# Patient Record
Sex: MALE | Race: WHITE | NOT HISPANIC OR LATINO
[De-identification: names, ages, dates, MRNs, and addresses within clinical notes are randomized per-mention and may not be internally consistent; named-entity substitution may affect disease eponyms.]

---

## 2020-06-16 ENCOUNTER — APPOINTMENT (OUTPATIENT)
Dept: INTERNAL MEDICINE | Facility: CLINIC | Age: 60
End: 2020-06-16
Payer: COMMERCIAL

## 2020-06-16 VITALS
HEIGHT: 73 IN | BODY MASS INDEX: 23.86 KG/M2 | DIASTOLIC BLOOD PRESSURE: 80 MMHG | SYSTOLIC BLOOD PRESSURE: 120 MMHG | WEIGHT: 180 LBS

## 2020-06-16 DIAGNOSIS — Z80.0 FAMILY HISTORY OF MALIGNANT NEOPLASM OF DIGESTIVE ORGANS: ICD-10-CM

## 2020-06-16 DIAGNOSIS — Z82.49 FAMILY HISTORY OF ISCHEMIC HEART DISEASE AND OTHER DISEASES OF THE CIRCULATORY SYSTEM: ICD-10-CM

## 2020-06-16 PROCEDURE — 99443: CPT

## 2020-06-16 NOTE — ASSESSMENT
[FreeTextEntry1] : 1. Anxiety/Depression: has been worse in the past and again worse, agrees to start again, will start on Lexapro 5 mg and follow up in 2 weeks. \par 2. Hyperglycemia : will re check labs\par 3. HTN : well controlled\par 4: Parkinson's : has to awaken in the middle of the night to take meds, will discuss with Neuro re long acting meds\par 5. Possible COVID exposure: will test. \par 6. cont Medical Marijuana

## 2020-06-16 NOTE — HEALTH RISK ASSESSMENT
[No] : No [No falls in past year] : Patient reported no falls in the past year [1] : 2) Feeling down, depressed, or hopeless for several days (1) [] : No [de-identified] : occ marijuana  [de-identified] : walking

## 2020-06-16 NOTE — HISTORY OF PRESENT ILLNESS
[Home] : at home, [unfilled] , at the time of the visit. [Other Location: e.g. Home (Enter Location, City,State)___] : at [unfilled] [Spouse] : spouse [Verbal consent obtained from patient] : the patient, [unfilled] [FreeTextEntry1] : Here for reestablishment of medical care. Has not been feeling well. Weight is down due loss of appetite  for the past months on and off. \par Mood is up and down, anxiety is up and started on xanax by Sima and much relieved.  [de-identified] : 1. Parkinson's \par 2. Hyperglycemia\par 3. HTN

## 2020-06-16 NOTE — REVIEW OF SYSTEMS
[Fatigue] : fatigue [Nausea] : nausea [Vomiting] : vomiting [Nocturia] : nocturia [Joint Pain] : joint pain [Anxiety] : anxiety [Depression] : depression [Negative] : Integumentary [de-identified] : due to Parkinson's [FreeTextEntry7] : occasional  [FreeTextEntry9] : shoulder pain chronic

## 2020-06-30 ENCOUNTER — APPOINTMENT (OUTPATIENT)
Dept: INTERNAL MEDICINE | Facility: CLINIC | Age: 60
End: 2020-06-30
Payer: COMMERCIAL

## 2020-06-30 VITALS — WEIGHT: 190 LBS | HEIGHT: 73.5 IN | BODY MASS INDEX: 24.64 KG/M2

## 2020-06-30 PROCEDURE — 99442: CPT

## 2020-06-30 NOTE — ASSESSMENT
[FreeTextEntry1] : Anxiety: has only been on the meds( lexapro) for one week, to continue and follow up  in 1-2 weeks. Wife states his mood  and anxiety are already better, he does not notice it.

## 2020-06-30 NOTE — HISTORY OF PRESENT ILLNESS
[Home] : at home, [unfilled] , at the time of the visit. [Other Location: e.g. Home (Enter Location, City,State)___] : at [unfilled] [Spouse] : spouse [Verbal consent obtained from patient] : the patient, [unfilled] [de-identified] : Hx of Anxiety  and started on Lexapro one week ago and  wife notes some improvement in his anxiety  and depression. Appetite is still down and wt is down.

## 2020-07-07 LAB
ALBUMIN SERPL ELPH-MCNC: 4.5 G/DL
ALP BLD-CCNC: 49 U/L
ALT SERPL-CCNC: <5 U/L
ANION GAP SERPL CALC-SCNC: 12 MMOL/L
AST SERPL-CCNC: 11 U/L
BASOPHILS # BLD AUTO: 0.05 K/UL
BASOPHILS NFR BLD AUTO: 0.6 %
BILIRUB SERPL-MCNC: 0.6 MG/DL
BUN SERPL-MCNC: 11 MG/DL
CALCIUM SERPL-MCNC: 9.5 MG/DL
CHLORIDE SERPL-SCNC: 91 MMOL/L
CHOLEST SERPL-MCNC: 161 MG/DL
CHOLEST/HDLC SERPL: 2.9 RATIO
CO2 SERPL-SCNC: 24 MMOL/L
CREAT SERPL-MCNC: 0.62 MG/DL
EOSINOPHIL # BLD AUTO: 0.19 K/UL
EOSINOPHIL NFR BLD AUTO: 2.2 %
ESTIMATED AVERAGE GLUCOSE: 117 MG/DL
GLUCOSE SERPL-MCNC: 134 MG/DL
HBA1C MFR BLD HPLC: 5.7 %
HCT VFR BLD CALC: 39.8 %
HDLC SERPL-MCNC: 56 MG/DL
HGB BLD-MCNC: 12.8 G/DL
IMM GRANULOCYTES NFR BLD AUTO: 0.5 %
LDLC SERPL CALC-MCNC: 90 MG/DL
LYMPHOCYTES # BLD AUTO: 1.13 K/UL
LYMPHOCYTES NFR BLD AUTO: 13 %
MAN DIFF?: NORMAL
MCHC RBC-ENTMCNC: 29.5 PG
MCHC RBC-ENTMCNC: 32.2 GM/DL
MCV RBC AUTO: 91.7 FL
MONOCYTES # BLD AUTO: 0.69 K/UL
MONOCYTES NFR BLD AUTO: 8 %
NEUTROPHILS # BLD AUTO: 6.57 K/UL
NEUTROPHILS NFR BLD AUTO: 75.7 %
PLATELET # BLD AUTO: 349 K/UL
POTASSIUM SERPL-SCNC: 4.5 MMOL/L
PROT SERPL-MCNC: 6.4 G/DL
RBC # BLD: 4.34 M/UL
RBC # FLD: 12.5 %
SARS-COV-2 IGG SERPL IA-ACNC: <0.1 INDEX
SARS-COV-2 IGG SERPL QL IA: NEGATIVE
SODIUM SERPL-SCNC: 126 MMOL/L
TRIGL SERPL-MCNC: 76 MG/DL
TSH SERPL-ACNC: 1.1 UIU/ML
WBC # FLD AUTO: 8.67 K/UL

## 2020-07-13 ENCOUNTER — RX RENEWAL (OUTPATIENT)
Age: 60
End: 2020-07-13

## 2020-07-21 ENCOUNTER — TRANSCRIPTION ENCOUNTER (OUTPATIENT)
Age: 60
End: 2020-07-21

## 2020-07-21 ENCOUNTER — APPOINTMENT (OUTPATIENT)
Dept: INTERNAL MEDICINE | Facility: CLINIC | Age: 60
End: 2020-07-21
Payer: COMMERCIAL

## 2020-07-21 PROCEDURE — 99213 OFFICE O/P EST LOW 20 MIN: CPT | Mod: 95

## 2020-07-21 NOTE — PHYSICAL EXAM
[Normal] : no acute distress, well nourished, well developed and well-appearing [Speech Grossly Normal] : speech grossly normal [de-identified] : slightly stoned face c/w Parkinson's

## 2020-07-21 NOTE — HISTORY OF PRESENT ILLNESS
[Home] : at home, [unfilled] , at the time of the visit. [Medical Office: ([facility])___] : at the medical office located in  [Verbal consent obtained from patient] : the patient, [unfilled] [Spouse] : spouse [FreeTextEntry1] : here for follow up, has been taking the Lexapro and his wife clearly states that he is less anxious. States he is not eating as much as, thinks that he is losing weight. States that he is not sleeping well, and according to his wife it is a little better .  [de-identified] : Here for follow up for medication effectiveness

## 2020-07-21 NOTE — ASSESSMENT
[FreeTextEntry1] : Anxiety /Depression: To continue on the Lexapro 10, down to 1 xanax per day from 3x day and to continue  the current therapies. F/u with telehealth in 2 weeks .

## 2020-08-09 ENCOUNTER — RX RENEWAL (OUTPATIENT)
Age: 60
End: 2020-08-09

## 2020-09-07 RX ORDER — ESCITALOPRAM OXALATE 20 MG/1
20 TABLET ORAL DAILY
Qty: 30 | Refills: 0 | Status: DISCONTINUED | COMMUNITY
Start: 2020-07-13 | End: 2020-09-07

## 2020-09-22 ENCOUNTER — APPOINTMENT (OUTPATIENT)
Dept: INTERNAL MEDICINE | Facility: CLINIC | Age: 60
End: 2020-09-22
Payer: COMMERCIAL

## 2020-09-22 VITALS
WEIGHT: 178 LBS | DIASTOLIC BLOOD PRESSURE: 60 MMHG | BODY MASS INDEX: 23.09 KG/M2 | TEMPERATURE: 97.8 F | SYSTOLIC BLOOD PRESSURE: 100 MMHG | OXYGEN SATURATION: 98 % | HEIGHT: 73.5 IN | HEART RATE: 78 BPM

## 2020-09-22 PROCEDURE — 90715 TDAP VACCINE 7 YRS/> IM: CPT

## 2020-09-22 PROCEDURE — 90686 IIV4 VACC NO PRSV 0.5 ML IM: CPT

## 2020-09-22 PROCEDURE — 99213 OFFICE O/P EST LOW 20 MIN: CPT | Mod: 25

## 2020-09-22 PROCEDURE — 90472 IMMUNIZATION ADMIN EACH ADD: CPT

## 2020-09-22 PROCEDURE — G0008: CPT

## 2020-09-22 RX ORDER — AZILSARTAN KAMEDOXOMIL 40 MG/1
40 TABLET ORAL
Refills: 0 | Status: DISCONTINUED | COMMUNITY
End: 2020-09-22

## 2020-09-22 NOTE — HISTORY OF PRESENT ILLNESS
[FreeTextEntry1] : Here for follow up. Continue to complain of decreased appetite, wt loss, and stiffness and balance problems .  [de-identified] : Having difficulties with movement and even fell 6 days ago, abrasion of his shoulder, and back. Unknown when his last tetanus vaccine was. Started to go back to PT and working on balance and strength training . Anxiety is better , remains on the 10 mg of lexapro .

## 2020-09-22 NOTE — ASSESSMENT
[FreeTextEntry1] : 1. HTN : too low may be contributing to his lightheadedness , to stop\par 2. abrasion: needs tdap \par 3. needs flu vaccine \par 4. Hyperglycemia, cont off the metformin , a1cc = 5.7\par 5 wt loss: decreased appetite , likely secondary to his meds and  disease\par 6. Parkinson's . to follow up with Dr Gao, and to cont PT  and home exercise. \par

## 2020-09-22 NOTE — PHYSICAL EXAM
[Normal] : soft, non-tender, non-distended, no masses palpated, no HSM and normal bowel sounds [de-identified] : masked face [de-identified] : minor abrasion on shoulder and back , no evidence of infection

## 2020-11-16 ENCOUNTER — RX RENEWAL (OUTPATIENT)
Age: 60
End: 2020-11-16

## 2020-11-24 ENCOUNTER — APPOINTMENT (OUTPATIENT)
Dept: INTERNAL MEDICINE | Facility: CLINIC | Age: 60
End: 2020-11-24
Payer: COMMERCIAL

## 2020-11-24 VITALS
TEMPERATURE: 96.44 F | WEIGHT: 175 LBS | BODY MASS INDEX: 22.78 KG/M2 | SYSTOLIC BLOOD PRESSURE: 128 MMHG | OXYGEN SATURATION: 98 % | HEART RATE: 82 BPM | DIASTOLIC BLOOD PRESSURE: 74 MMHG

## 2020-11-24 LAB
ALBUMIN SERPL ELPH-MCNC: 4.7 G/DL
ALP BLD-CCNC: 60 U/L
ALT SERPL-CCNC: <5 U/L
ANION GAP SERPL CALC-SCNC: 12 MMOL/L
AST SERPL-CCNC: 15 U/L
BASOPHILS # BLD AUTO: 0.05 K/UL
BASOPHILS NFR BLD AUTO: 0.7 %
BILIRUB SERPL-MCNC: 0.3 MG/DL
BUN SERPL-MCNC: 17 MG/DL
CALCIUM SERPL-MCNC: 9.6 MG/DL
CHLORIDE SERPL-SCNC: 100 MMOL/L
CHOLEST SERPL-MCNC: 182 MG/DL
CO2 SERPL-SCNC: 25 MMOL/L
CREAT SERPL-MCNC: 0.71 MG/DL
EOSINOPHIL # BLD AUTO: 0.25 K/UL
EOSINOPHIL NFR BLD AUTO: 3.3 %
ESTIMATED AVERAGE GLUCOSE: 114 MG/DL
GLUCOSE SERPL-MCNC: 114 MG/DL
HBA1C MFR BLD HPLC: 5.6 %
HCT VFR BLD CALC: 42.6 %
HDLC SERPL-MCNC: 76 MG/DL
HGB BLD-MCNC: 13.6 G/DL
IMM GRANULOCYTES NFR BLD AUTO: 0.4 %
LDLC SERPL CALC-MCNC: 97 MG/DL
LYMPHOCYTES # BLD AUTO: 2 K/UL
LYMPHOCYTES NFR BLD AUTO: 26 %
MAN DIFF?: NORMAL
MCHC RBC-ENTMCNC: 30.8 PG
MCHC RBC-ENTMCNC: 31.9 GM/DL
MCV RBC AUTO: 96.6 FL
MONOCYTES # BLD AUTO: 0.57 K/UL
MONOCYTES NFR BLD AUTO: 7.4 %
NEUTROPHILS # BLD AUTO: 4.78 K/UL
NEUTROPHILS NFR BLD AUTO: 62.2 %
NONHDLC SERPL-MCNC: 106 MG/DL
PLATELET # BLD AUTO: 329 K/UL
POTASSIUM SERPL-SCNC: 4.9 MMOL/L
PROT SERPL-MCNC: 6.6 G/DL
PSA SERPL-MCNC: 0.84 NG/ML
RBC # BLD: 4.41 M/UL
RBC # FLD: 13.3 %
SODIUM SERPL-SCNC: 137 MMOL/L
TRIGL SERPL-MCNC: 41 MG/DL
TSH SERPL-ACNC: 1.43 UIU/ML
WBC # FLD AUTO: 7.68 K/UL

## 2020-11-24 PROCEDURE — 36415 COLL VENOUS BLD VENIPUNCTURE: CPT

## 2020-11-24 PROCEDURE — 99396 PREV VISIT EST AGE 40-64: CPT | Mod: 25

## 2020-11-24 RX ORDER — AMOXICILLIN 500 MG/1
500 CAPSULE ORAL
Qty: 21 | Refills: 0 | Status: DISCONTINUED | COMMUNITY
Start: 2020-08-14

## 2020-11-24 NOTE — HEALTH RISK ASSESSMENT
[Fair] :  ~his/her~ mood as fair [Intercurrent ED visits] : went to ED [No] : No [One fall no injury in past year] : Patient reported one fall in the past year without injury [1] : 2) Feeling down, depressed, or hopeless for several days (1) [None] : None [With Significant Other] : lives with significant other [# of Members in Household ___] :  household currently consist of [unfilled] member(s) [Retired] : retired [College] : College [] :  [# Of Children ___] : has [unfilled] children [Feels Safe at Home] : Feels safe at home [Smoke Detector] : smoke detector [Carbon Monoxide Detector] : carbon monoxide detector [Safety elements used in home] : safety elements used in home [Seat Belt] :  uses seat belt [Sunscreen] : uses sunscreen [With Patient/Caregiver] : With Patient/Caregiver [] : No [de-identified] : Doing some PT  [EyeExamDate] : 2019 [Change in mental status noted] : No change in mental status noted [Language] : denies difficulty with language [Behavior] : denies difficulty with behavior [Learning/Retaining New Information] : denies difficulty learning/retaining new information [Handling Complex Tasks] : denies difficulty handling complex tasks [Reasoning] : denies difficulty with reasoning [Spatial Ability and Orientation] : denies difficulty with spatial ability and orientation [Sexually Active] : not sexually active [High Risk Behavior] : no high risk behavior [Reports changes in hearing] : Reports no changes in hearing [Reports changes in vision] : Reports no changes in vision [Reports normal functional visual acuity (ie: able to read med bottle)] : Reports poor functional visual acuity.  [Reports changes in dental health] : Reports no changes in dental health [Guns at Home] : no guns at home [Travel to Developing Areas] : does not  travel to developing areas [TB Exposure] : is not being exposed to tuberculosis [Caregiver Concerns] : does not have caregiver concerns [de-identified] : no [de-identified] : no [AdvancecareDate] : 11/24/20

## 2020-11-24 NOTE — PHYSICAL EXAM
[No Acute Distress] : no acute distress [Well Nourished] : well nourished [Well Developed] : well developed [Normal Voice/Communication] : normal voice/communication [Ill-Appearing] : ill-appearing [No Carotid Bruits] : no carotid bruits [Normal Appearance] : normal in appearance [No Masses] : no palpable masses [No Nipple Discharge] : no nipple discharge [Normal Sphincter Tone] : normal sphincter tone [No Mass] : no mass [Stool Occult Blood] : stool negative for occult blood [Normal] : no rash [Memory Grossly Normal] : memory grossly normal [Alert and Oriented x3] : oriented to person, place, and time [de-identified] : sarcopenia  [de-identified] : stiffness [de-identified] : slightly depressed

## 2020-11-24 NOTE — HISTORY OF PRESENT ILLNESS
[Spouse] : spouse [FreeTextEntry1] : Here for full PE [de-identified] : Here for full PE, hx of Parkinson's disease that is progressing. Hx of anxiety as well as pre DM. No symptoms of covid, not exposed

## 2020-11-24 NOTE — ASSESSMENT
[FreeTextEntry1] : 1. Anxiety: a little bit better according to his wife. Mostly is when he is home alone\par 2 Parkinson's to follow up with Dr Gao \par 3Hyperglycemia: repeat today \par 4. HTN controlled \par 5. HCM: received the flu vaccine , received both shingles, pna to check , colon up to date , to get old records\par 6. knee to follow up with ortho. \par 7. pain : using cbd oil/pills

## 2020-11-24 NOTE — REVIEW OF SYSTEMS
[Fatigue] : fatigue [Recent Change In Weight] : ~T recent weight change [Joint Pain] : joint pain [Joint Stiffness] : joint stiffness [Muscle Weakness] : muscle weakness [Suicidal] : not suicidal [Insomnia] : no insomnia [Anxiety] : anxiety [Depression] : depression [Negative] : Integumentary [FreeTextEntry2] : decreased appetite [FreeTextEntry9] : knee pains, seen by ortho,who rec knee injections [de-identified] : + Parkinson's stiffness, tremor

## 2021-02-16 ENCOUNTER — RX RENEWAL (OUTPATIENT)
Age: 61
End: 2021-02-16

## 2021-03-02 LAB
ESTIMATED AVERAGE GLUCOSE: 117 MG/DL
HBA1C MFR BLD HPLC: 5.7 %

## 2021-03-03 LAB
ALBUMIN SERPL ELPH-MCNC: 4.3 G/DL
ALP BLD-CCNC: 45 U/L
ALT SERPL-CCNC: 6 U/L
ANION GAP SERPL CALC-SCNC: 9 MMOL/L
AST SERPL-CCNC: 13 U/L
BILIRUB SERPL-MCNC: 0.6 MG/DL
BUN SERPL-MCNC: 18 MG/DL
CALCIUM SERPL-MCNC: 9.8 MG/DL
CHLORIDE SERPL-SCNC: 100 MMOL/L
CO2 SERPL-SCNC: 30 MMOL/L
CREAT SERPL-MCNC: 0.84 MG/DL
GLUCOSE SERPL-MCNC: 100 MG/DL
POTASSIUM SERPL-SCNC: 4.1 MMOL/L
PROT SERPL-MCNC: 6.2 G/DL
SODIUM SERPL-SCNC: 139 MMOL/L

## 2021-03-08 ENCOUNTER — RX RENEWAL (OUTPATIENT)
Age: 61
End: 2021-03-08

## 2021-03-16 ENCOUNTER — APPOINTMENT (OUTPATIENT)
Dept: INTERNAL MEDICINE | Facility: CLINIC | Age: 61
End: 2021-03-16
Payer: COMMERCIAL

## 2021-03-16 VITALS
BODY MASS INDEX: 23.61 KG/M2 | OXYGEN SATURATION: 98 % | HEIGHT: 73.5 IN | SYSTOLIC BLOOD PRESSURE: 126 MMHG | WEIGHT: 182 LBS | HEART RATE: 74 BPM | DIASTOLIC BLOOD PRESSURE: 80 MMHG | TEMPERATURE: 97.34 F

## 2021-03-16 PROCEDURE — 99214 OFFICE O/P EST MOD 30 MIN: CPT

## 2021-03-16 PROCEDURE — 99072 ADDL SUPL MATRL&STAF TM PHE: CPT

## 2021-03-16 RX ORDER — CARBIDOPA AND LEVODOPA 25; 100 MG/1; MG/1
25-100 TABLET ORAL
Refills: 0 | Status: DISCONTINUED | COMMUNITY
End: 2021-03-16

## 2021-03-16 RX ORDER — ESCITALOPRAM OXALATE 5 MG/1
5 TABLET ORAL DAILY
Qty: 30 | Refills: 0 | Status: DISCONTINUED | COMMUNITY
Start: 2020-06-16 | End: 2021-03-16

## 2021-03-16 NOTE — ASSESSMENT
[FreeTextEntry1] : 1. Anxiety: No longer any better , agress to see psych raul in light of his worsening and assoc depression \par 2 Parkinson's to follow up with Dr Gao  has an appointment today , to get a second opinion from DR Libman\par 3 Hyperglycemia: repeat 5.7\par 4. HTN controlled \par 5. HCM: received the flu vaccine , received both shingles, pna to check , colon up to date , to get old records\par 6. knee to follow up with ortho. \par 7. pain : using cbd oil/pills\par 8 Covid : interested in J&J vaccine , to seek it , letter provided \par 9  follow up in 3 mos .

## 2021-03-16 NOTE — HISTORY OF PRESENT ILLNESS
[Spouse] : spouse [FreeTextEntry1] : Here for follow up.  [de-identified] : Here with his wife. He is very depressed , very anxious. Awakening  his wife all night long . He complains of stiffness, pain . Walking with a walker now , needs help at home. Starts with medicare  next month 4/1/21 .

## 2021-03-16 NOTE — PHYSICAL EXAM
[Normal] : no carotid or abdominal bruits heard, no varicosities, pedal pulses are present, no peripheral edema, no extremity clubbing or cyanosis and no palpable aorta [de-identified] : stiffness  [de-identified] : flat affect

## 2021-03-16 NOTE — REVIEW OF SYSTEMS
[Unsteady Walk] : ataxia [Suicidal] : not suicidal [Insomnia] : insomnia [Anxiety] : anxiety [Depression] : depression [de-identified] : muscular stiffness  [de-identified] : depressed, anxious,

## 2021-04-21 ENCOUNTER — NON-APPOINTMENT (OUTPATIENT)
Age: 61
End: 2021-04-21

## 2021-05-25 ENCOUNTER — NON-APPOINTMENT (OUTPATIENT)
Age: 61
End: 2021-05-25

## 2021-06-01 ENCOUNTER — APPOINTMENT (OUTPATIENT)
Dept: INTERNAL MEDICINE | Facility: CLINIC | Age: 61
End: 2021-06-01
Payer: MEDICARE

## 2021-06-01 ENCOUNTER — NON-APPOINTMENT (OUTPATIENT)
Age: 61
End: 2021-06-01

## 2021-06-01 VITALS
BODY MASS INDEX: 25.03 KG/M2 | DIASTOLIC BLOOD PRESSURE: 68 MMHG | HEART RATE: 82 BPM | TEMPERATURE: 98.3 F | HEIGHT: 73.5 IN | WEIGHT: 193 LBS | SYSTOLIC BLOOD PRESSURE: 130 MMHG | OXYGEN SATURATION: 98 %

## 2021-06-01 PROCEDURE — 99214 OFFICE O/P EST MOD 30 MIN: CPT

## 2021-06-01 PROCEDURE — 99072 ADDL SUPL MATRL&STAF TM PHE: CPT

## 2021-06-01 NOTE — ASSESSMENT
[FreeTextEntry1] : 1. Anxiety: No longer any better , agress to see psych raul in light of his worsening and assoc depression \par 2 Parkinson's  : has an appointment with a neurologist for a second opinion \par 3 Hyperglycemia: repeat 5.7, wt is up 10 lbs, to repeat . \par 4. HTN controlled \par 5. HCM: received the flu vaccine , received both shingles , colon up to date ,\par 6. knee seen by ortho, does not follow recommendations \par 7. pain : using cbd oil/pills\par 8 Covid : s/p pfizer x2 \par 9  follow up in 3 mos .
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
## 2021-06-02 LAB
ALBUMIN SERPL ELPH-MCNC: 4.5 G/DL
ALP BLD-CCNC: 50 U/L
ALT SERPL-CCNC: 7 U/L
ANION GAP SERPL CALC-SCNC: 12 MMOL/L
AST SERPL-CCNC: 14 U/L
BILIRUB SERPL-MCNC: 0.4 MG/DL
BUN SERPL-MCNC: 17 MG/DL
CALCIUM SERPL-MCNC: 9.6 MG/DL
CHLORIDE SERPL-SCNC: 98 MMOL/L
CO2 SERPL-SCNC: 25 MMOL/L
CREAT SERPL-MCNC: 0.58 MG/DL
ESTIMATED AVERAGE GLUCOSE: 120 MG/DL
GLUCOSE SERPL-MCNC: 81 MG/DL
HBA1C MFR BLD HPLC: 5.8 %
POTASSIUM SERPL-SCNC: 4.5 MMOL/L
PROT SERPL-MCNC: 6.5 G/DL
SODIUM SERPL-SCNC: 135 MMOL/L

## 2021-06-04 ENCOUNTER — OUTPATIENT (OUTPATIENT)
Dept: OUTPATIENT SERVICES | Facility: HOSPITAL | Age: 61
LOS: 1 days | Discharge: STILL ADMITTED OR ADMINISTRATIVE | End: 2021-06-04
Payer: MEDICARE

## 2021-06-04 PROCEDURE — 99214 OFFICE O/P EST MOD 30 MIN: CPT

## 2021-06-07 DIAGNOSIS — F32.9 MAJOR DEPRESSIVE DISORDER, SINGLE EPISODE, UNSPECIFIED: ICD-10-CM

## 2021-06-25 ENCOUNTER — APPOINTMENT (OUTPATIENT)
Dept: NEUROLOGY | Facility: CLINIC | Age: 61
End: 2021-06-25
Payer: MEDICARE

## 2021-06-25 VITALS — SYSTOLIC BLOOD PRESSURE: 142 MMHG | DIASTOLIC BLOOD PRESSURE: 85 MMHG | HEART RATE: 79 BPM

## 2021-06-25 VITALS
WEIGHT: 200 LBS | HEART RATE: 75 BPM | BODY MASS INDEX: 26.51 KG/M2 | SYSTOLIC BLOOD PRESSURE: 141 MMHG | DIASTOLIC BLOOD PRESSURE: 79 MMHG | HEIGHT: 73 IN

## 2021-06-25 PROCEDURE — 99205 OFFICE O/P NEW HI 60 MIN: CPT

## 2021-06-25 PROCEDURE — 99072 ADDL SUPL MATRL&STAF TM PHE: CPT

## 2021-06-25 NOTE — DISCUSSION/SUMMARY
[FreeTextEntry1] : Osman Mitchell is a 60-year-old right-handed male with a diagnosis of Parkinson's disease since 2015.  On exam he is noted to have bilateral bradykinesia left worse than right.  Patient is reporting frequent off.  In spite of taking medications every 4 hours\par He also endorses significant anxiety for which she is following up with [facility] psychiatry\par \par Impression- parkinsonism most likely idiopathic Parkinson's disease, anxiety\par Present they deny cognitive changes\par \par Plan\par I had an extensive discussion with the patient and his wife.  Various treatment options to reduce off time were discussed. \par start with Comtan 200 mg each dose of Sinemet, may consider Stalevo in the future\par Rytary and dopamine agonists may be other options\par Physical therapy, speech therapy\par All her questions were addressed and answered\par Follow-up in 6 to 8 weeks\par

## 2021-06-25 NOTE — PHYSICAL EXAM
[FreeTextEntry1] : On exam patient is awake and alert.  He is pleasant cooperative with the exam.\par Face is symmetric tongue and uvula midline and symmetric.  Speech is soft but easy to understand\par Extra ocular movements are intact\par No resting action or postural tremors are seen\par Bilateral bradykinesia\par Hand opening and closing 2 on the right 3 on the left\par Rapid alternating movements 1 on the right 2 on the left\par Finger taps 2 bilaterally left slightly slower\par Leg agility 1 bilaterally\par Tone is normal\par Slow to get up from the chair but able to do so in the first attempt\par Posture is stooped and scoliotic\par Small steps but able to walk independently\par Strength 5/5 plantars are downgoing\par

## 2021-07-12 ENCOUNTER — OUTPATIENT (OUTPATIENT)
Dept: OUTPATIENT SERVICES | Facility: HOSPITAL | Age: 61
LOS: 1 days | Discharge: LEFT BEFORE DISCHARGE | End: 2021-07-12

## 2021-08-06 ENCOUNTER — APPOINTMENT (OUTPATIENT)
Dept: NEUROLOGY | Facility: CLINIC | Age: 61
End: 2021-08-06
Payer: MEDICARE

## 2021-08-06 VITALS
HEIGHT: 73 IN | SYSTOLIC BLOOD PRESSURE: 143 MMHG | DIASTOLIC BLOOD PRESSURE: 86 MMHG | BODY MASS INDEX: 26.24 KG/M2 | WEIGHT: 198 LBS | HEART RATE: 73 BPM

## 2021-08-06 PROCEDURE — 99214 OFFICE O/P EST MOD 30 MIN: CPT

## 2021-08-06 NOTE — DISCUSSION/SUMMARY
[FreeTextEntry1] : Osman Mitchell is a 60-year-old right-handed male with a diagnosis of Parkinson's disease since 2015.  On exam he is noted to have bilateral bradykinesia left worse than right.  Patient is reporting frequent off.  In spite of taking medications every 4 hours\par He also endorses significant anxiety for which she is following up with [facility] psychiatry\par \par Impression- parkinsonism most likely idiopathic Parkinson's disease, anxiety\par Present they deny cognitive changes\par \par Plan\par I had an extensive discussion with the patient and his wife.  Various treatment options to reduce off time were discussed. \par Currently patient is taking only half of Comtan with each dose.  He is advised to increase Comtan 200 mg each dose of Sinemet-prescription for Stalevo 200 was sent\par Physical therapy, speech therapy\par Patient will follow up with me in 2 weeks time, may consider dopamine agonist\par Has a psychiatry consultation coming up soon\par All her questions were addressed and answered\par \par
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
## 2021-08-06 NOTE — PHYSICAL EXAM
[FreeTextEntry1] : On exam patient is awake and alert.  He is pleasant cooperative with the exam.\par Face is symmetric tongue and uvula midline and symmetric.  Speech is soft but easy to understand\par Extra ocular movements are intact\par No resting action or postural tremors are seen\par Bilateral bradykinesia\par Hand opening and closing 2 on the right 3 on the left\par Rapid alternating movements 2 on the right 3 on the left\par Finger taps 2 bilaterally left slightly slower\par Leg agility 1 bilaterally\par Curling of toes on both feet is seen\par Slow to get up from the chair but able to do so in the first attempt\par Posture is stooped and scoliotic\par Small steps but able to walk independently\par Strength 5/5 plantars are downgoing\par

## 2021-08-17 ENCOUNTER — APPOINTMENT (OUTPATIENT)
Dept: INTERNAL MEDICINE | Facility: CLINIC | Age: 61
End: 2021-08-17
Payer: MEDICARE

## 2021-08-17 VITALS
BODY MASS INDEX: 26.39 KG/M2 | SYSTOLIC BLOOD PRESSURE: 124 MMHG | OXYGEN SATURATION: 95 % | TEMPERATURE: 98 F | WEIGHT: 200 LBS | DIASTOLIC BLOOD PRESSURE: 60 MMHG | HEART RATE: 92 BPM

## 2021-08-17 PROCEDURE — 83036 HEMOGLOBIN GLYCOSYLATED A1C: CPT | Mod: QW

## 2021-08-17 PROCEDURE — 99214 OFFICE O/P EST MOD 30 MIN: CPT

## 2021-08-17 NOTE — ASSESSMENT
[FreeTextEntry1] : 1. Anxiety: worse with worsening parkinson's symptoms, to follow up with neuro and psych \par 2 Parkinson's  : has an appointment with a neurologist for a second visit  to adjust his meds \par 3 Hyperglycemia: repeat 5.7to 5.8, wt is up 2 lbs, to repeat  today \par 4. HTN controlled \par 5. HCM: received the flu vaccine , received both shingles , colon up to date ,\par 6. knee seen by ortho, to follow up for poss injections( Dr Maxwell ) \par 7. pain : using cbd oil/pills , to take it \par 8 Covid : s/p pfizer x2 \par 9  follow up in 3 mos .

## 2021-08-17 NOTE — PHYSICAL EXAM
[No Acute Distress] : no acute distress [Normal] : no joint swelling and grossly normal strength and tone [de-identified] : cog wheel stiffness, minor facial stiffness

## 2021-08-17 NOTE — HISTORY OF PRESENT ILLNESS
[FreeTextEntry1] : Here for follow up for his hyperglycemia, and anxiety issues  [de-identified] : Has finally met with the psychiatrist who agrees to treat him. He is investigating his hx and then will make recommendations as to  whatDavid should take. At this time in terms of his anxiety  is worse as his pain is worse. He has an appointment with neuro next week. Right now he is complaining of pain in his back  and  radiating down the left leg and of course stiffness as well.  As his pain has increased his awakening with panic has of course increased.

## 2021-08-19 ENCOUNTER — APPOINTMENT (OUTPATIENT)
Dept: NEUROLOGY | Facility: CLINIC | Age: 61
End: 2021-08-19
Payer: MEDICARE

## 2021-08-19 VITALS
HEART RATE: 62 BPM | BODY MASS INDEX: 26.51 KG/M2 | DIASTOLIC BLOOD PRESSURE: 74 MMHG | SYSTOLIC BLOOD PRESSURE: 123 MMHG | HEIGHT: 73 IN | WEIGHT: 200 LBS

## 2021-08-19 PROCEDURE — 99214 OFFICE O/P EST MOD 30 MIN: CPT

## 2021-08-19 RX ORDER — CARBIDOPA, LEVODOPA AND ENTACAPONE 50; 200; 200 MG/1; MG/1; MG/1
50-200-200 TABLET, FILM COATED ORAL DAILY
Qty: 180 | Refills: 3 | Status: DISCONTINUED | COMMUNITY
Start: 2021-08-06 | End: 2021-08-19
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
## 2021-08-19 NOTE — DISCUSSION/SUMMARY
[FreeTextEntry1] : Osman Mitchell is a 61-year-old right-handed male with a diagnosis of Parkinson's disease since 2015.  On exam he is noted to have bilateral bradykinesia left worse than right.  Patient is reporting frequent off.  In spite of taking medications every 4 hours\par He also endorses significant anxiety for which he is following up with [facility] psychiatry\par \par Impression- parkinsonism most likely idiopathic Parkinson's disease, anxiety\par Present they deny cognitive changes\par \par Plan\par Currently patient is taking Sinemet 25/102 tablets 6 times a day along with Comtan at each dose.  He still continues to have wearing off of symptoms especially on the left side\par Add Neupro patch 2 mg daily.  Side effects were discussed in detail\par He also reports having some back pain which is better with salon spa patches.  If no improvement with Parkinson's medications may consider imaging of the spine\par Physical therapy, speech therapy\par Patient will follow up with me in 6-8 weeks time\par \par All questions were addressed and answered\par \par

## 2021-09-14 ENCOUNTER — RX RENEWAL (OUTPATIENT)
Age: 61
End: 2021-09-14

## 2021-09-28 ENCOUNTER — APPOINTMENT (OUTPATIENT)
Dept: NEUROLOGY | Facility: CLINIC | Age: 61
End: 2021-09-28
Payer: MEDICARE

## 2021-09-28 VITALS
WEIGHT: 200 LBS | SYSTOLIC BLOOD PRESSURE: 120 MMHG | HEART RATE: 63 BPM | BODY MASS INDEX: 26.51 KG/M2 | HEIGHT: 73 IN | DIASTOLIC BLOOD PRESSURE: 72 MMHG

## 2021-09-28 PROCEDURE — 99214 OFFICE O/P EST MOD 30 MIN: CPT
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
## 2021-09-28 NOTE — DISCUSSION/SUMMARY
[FreeTextEntry1] : Brian Mitchell is a 61-year-old right-handed male with a diagnosis of Parkinson's disease since 2015.  On exam he is noted to have bilateral bradykinesia left worse than right.  Patient is reporting frequent off.  In spite of taking medications every 4 hours\par He also endorses significant anxiety for which he is following up with [facility] psychiatry\par \par Impression- parkinsonism most likely idiopathic Parkinson's disease, anxiety\par Present they deny cognitive changes, rare hallucinations\par \par Plan\par Currently patient is taking Sinemet 25/100, 2 tablets 6 times a day along with Comtan at each dose.  He finds it difficult to swallow Comtan.  Stalevo was not covered by their insurance.  He wishes to try taking Comtan with every other dose of levodopa\par Prescription for Ongentys was provided to see if it is covered then will discontinue Comtan\par Continue Neupro patch 2 mg daily.  Side effects were discussed in detail.  Patient reports having rare visual hallucinations which are mild.  These were happening even before the patch.  Patch was not increased today\par Continues to follow-up with psychiatry clonazepam has been added\par Physical therapy, speech therapy\par Patient will follow up with me in 3 months \par \par All questions were addressed and answered\par \par

## 2021-09-28 NOTE — PHYSICAL EXAM
[FreeTextEntry1] : On exam patient is awake and alert.  He is pleasant cooperative with the exam.\par Face is symmetric tongue and uvula midline and symmetric.  Speech is soft but easy to understand\par Extra ocular movements are intact\par No resting action or postural tremors are seen\par Bilateral bradykinesia\par Hand opening and closing 1 on the right 2 on the left\par Rapid alternating movements 1 on the right 2 on the left\par Finger taps 1 bilaterally left slightly slower\par Leg agility 1 bilaterally\par Curling of toes on both feet is seen\par Slow to get up from the chair but able to do so in the first attempt\par Posture is stooped and scoliotic\par Small steps but able to walk independently\par Strength 5/5 plantars are downgoing\par

## 2021-11-11 ENCOUNTER — APPOINTMENT (OUTPATIENT)
Dept: INTERNAL MEDICINE | Facility: CLINIC | Age: 61
End: 2021-11-11
Payer: MEDICARE

## 2021-11-11 VITALS
BODY MASS INDEX: 27.05 KG/M2 | HEART RATE: 81 BPM | WEIGHT: 205 LBS | DIASTOLIC BLOOD PRESSURE: 82 MMHG | TEMPERATURE: 98.1 F | SYSTOLIC BLOOD PRESSURE: 148 MMHG | OXYGEN SATURATION: 97 %

## 2021-11-11 VITALS — SYSTOLIC BLOOD PRESSURE: 120 MMHG | DIASTOLIC BLOOD PRESSURE: 80 MMHG

## 2021-11-11 PROCEDURE — 99214 OFFICE O/P EST MOD 30 MIN: CPT | Mod: 25

## 2021-11-11 PROCEDURE — 90686 IIV4 VACC NO PRSV 0.5 ML IM: CPT

## 2021-11-11 PROCEDURE — G0008: CPT

## 2021-11-11 RX ORDER — CLONAZEPAM 0.5 MG/1
0.5 TABLET ORAL
Refills: 0 | Status: ACTIVE | COMMUNITY
Start: 2021-11-11

## 2021-11-11 RX ORDER — ALPRAZOLAM 0.25 MG/1
0.25 TABLET ORAL
Qty: 42 | Refills: 0 | Status: DISCONTINUED | COMMUNITY
End: 2021-11-11

## 2021-11-11 NOTE — PHYSICAL EXAM
[Normal] : normal rate, regular rhythm, normal S1 and S2 and no murmur heard [de-identified] : cog wheel rigidity, stone face,

## 2021-11-11 NOTE — ASSESSMENT
[FreeTextEntry1] : 1. Anxiety: worse with worsening parkinson's symptoms, to follow up with neuro and psych \par 2 Parkinson's  : per neuro : he has an unsteady gait, shuffling and requires a walker.He will require home nursing evaluation and care for  disease management and physical therapy to improve his gait and OT and speech therapy to improve speech and fine motor function \par 3 Hyperglycemia: repeat 5.7to 5.8, wt is up 2 lbs, to repeat  next visit \par 4. HTN controlled on repeat \par 5. HCM: given flu vaccine today  , received both shingles , colon up to date ,\par 6. knee seen by ortho, to follow up for poss injections( Dr Maxwell ) , to follow up as his knees are still bothering him. \par 7. pain : using cbd oil/pills , to take it \par 8 Covid : s/p pfizer x2  rec # 3 \par 9  follow up in 3 mos .

## 2021-11-11 NOTE — HISTORY OF PRESENT ILLNESS
[FreeTextEntry1] : here for follow up for his hyperglycemia , HTN seeing psych for his anxiety, and neuro for his Parkinson's  [de-identified] : Has finally met with the psychiatrist who agrees to treat him. He is investigating his hx and then will make recommendations as to  whatDavid should take. . Continues to complain of pain in his knees and back but refuses to take meds. Requested to have vns come again for an evaluation. Continues to deteriorate in terms of his Parkinson's .

## 2021-11-30 ENCOUNTER — NON-APPOINTMENT (OUTPATIENT)
Age: 61
End: 2021-11-30

## 2021-12-16 ENCOUNTER — NON-APPOINTMENT (OUTPATIENT)
Age: 61
End: 2021-12-16

## 2021-12-23 ENCOUNTER — APPOINTMENT (OUTPATIENT)
Dept: NEUROLOGY | Facility: CLINIC | Age: 61
End: 2021-12-23
Payer: MEDICARE

## 2021-12-23 VITALS
WEIGHT: 220 LBS | HEIGHT: 73 IN | HEART RATE: 73 BPM | SYSTOLIC BLOOD PRESSURE: 146 MMHG | BODY MASS INDEX: 29.16 KG/M2 | DIASTOLIC BLOOD PRESSURE: 77 MMHG

## 2021-12-23 PROCEDURE — 99214 OFFICE O/P EST MOD 30 MIN: CPT

## 2021-12-23 RX ORDER — RASAGILINE 1 MG/1
1 TABLET ORAL
Qty: 30 | Refills: 3 | Status: DISCONTINUED | COMMUNITY
End: 2021-12-23

## 2021-12-23 RX ORDER — CARBIDOPA AND LEVODOPA 25; 100 MG/1; MG/1
25-100 TABLET, ORALLY DISINTEGRATING ORAL AS DIRECTED
Qty: 360 | Refills: 3 | Status: DISCONTINUED | COMMUNITY
Start: 2020-05-07 | End: 2021-12-23

## 2021-12-23 RX ORDER — OPICAPONE 50 MG/1
50 CAPSULE ORAL
Qty: 30 | Refills: 3 | Status: DISCONTINUED | COMMUNITY
Start: 2021-09-28 | End: 2021-12-23
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
## 2021-12-23 NOTE — DISCUSSION/SUMMARY
[FreeTextEntry1] : Brian Mitchell is a 61-year-old right-handed male with a diagnosis of Parkinson's disease since 2015.  On exam he is noted to have bilateral bradykinesia left worse than right.  Patient is reporting frequent off.  In spite of taking medications every 4 hours\par He also endorses significant anxiety for which he is following up with [facility] psychiatry\par \par Impression- parkinsonism most likely idiopathic Parkinson's disease, anxiety\par Present they deny cognitive changes, rare hallucinations\par \par Plan\par MRI of the brain if not done recently or with prior neurologist\par Currently patient is taking Sinemet 25/100, 2 tablets 4-5 times a day along with Comtan at each dose.  He finds it difficult to swallow Comtan.  Stalevo and Ongentys was not covered by their insurance.  He wishes to try taking Comtan with every other dose of levodopa\par \par Continue Neupro patch 2 mg daily.  Side effects were discussed in detail.  Patient reports having rare visual hallucinations which are mild.  These were happening even before the patch.  Patch was not increased today\par \par Continues to follow-up with psychiatry clonazepam has been increased \par Will discontinue Azilect as patient is on mirtazapine and SSRI\par Patient will follow up with me in 3 months \par \par All questions were addressed and answered\par \par

## 2022-01-06 ENCOUNTER — RX RENEWAL (OUTPATIENT)
Age: 62
End: 2022-01-06

## 2022-01-18 ENCOUNTER — OUTPATIENT (OUTPATIENT)
Dept: OUTPATIENT SERVICES | Facility: HOSPITAL | Age: 62
LOS: 1 days | End: 2022-01-18
Payer: MEDICARE

## 2022-01-18 ENCOUNTER — APPOINTMENT (OUTPATIENT)
Dept: MRI IMAGING | Facility: CLINIC | Age: 62
End: 2022-01-18
Payer: MEDICARE

## 2022-01-18 DIAGNOSIS — G20 PARKINSON'S DISEASE: ICD-10-CM

## 2022-01-18 PROCEDURE — 70551 MRI BRAIN STEM W/O DYE: CPT

## 2022-01-18 PROCEDURE — 70551 MRI BRAIN STEM W/O DYE: CPT | Mod: 26

## 2022-02-08 ENCOUNTER — APPOINTMENT (OUTPATIENT)
Dept: INTERNAL MEDICINE | Facility: CLINIC | Age: 62
End: 2022-02-08
Payer: MEDICARE

## 2022-02-08 VITALS
DIASTOLIC BLOOD PRESSURE: 74 MMHG | WEIGHT: 227 LBS | BODY MASS INDEX: 30.09 KG/M2 | SYSTOLIC BLOOD PRESSURE: 135 MMHG | OXYGEN SATURATION: 97 % | TEMPERATURE: 97.6 F | HEART RATE: 86 BPM | HEIGHT: 73 IN

## 2022-02-08 PROCEDURE — 99396 PREV VISIT EST AGE 40-64: CPT

## 2022-02-08 NOTE — ASSESSMENT
[FreeTextEntry1] : 1. Anxiety: worse with worsening parkinson's symptoms, to follow up with neuro and psych \par 2 Parkinson's  : per neuro : he has an unsteady gait, shuffling and requires a walker.He will require home nursing evaluation and care for  disease management and physical therapy to improve his gait and OT and speech therapy to improve speech and fine motor function \par 3 Hyperglycemia: repeat 5.7to 5.8, wt is up 2 lbs, to repeat  next visit \par 4. HTN controlled on repeat \par 5. HCM: flu vaccine today  , received both shingles , colon up to date, received covid x 3  ,\par 6. knee seen by ortho, to follow up for poss injections( Dr Maxwell ) , to follow up as his knees are still bothering him. \par 7. pain : using cbd oil/pills , to take it \par 8 Covid : s/p pfizer x3 \par 9  follow up in 3 mos .

## 2022-02-08 NOTE — REVIEW OF SYSTEMS
[Nocturia] : nocturia [Unsteady Walk] : ataxia [Anxiety] : anxiety [Depression] : depression [Negative] : Cardiovascular [de-identified] : stiffness

## 2022-02-08 NOTE — HEALTH RISK ASSESSMENT
[Fair] :  ~his/her~ mood as fair [Never] : Never [No] : No [On disability] : on disability [] :  [Smoke Detector] : smoke detector [Carbon Monoxide Detector] : carbon monoxide detector [[facility]] : 8 [Guns at Home] : no guns at home [ColonoscopyComments] : recent to get report

## 2022-02-08 NOTE — HISTORY OF PRESENT ILLNESS
[FreeTextEntry1] : Here for annual full PE  [de-identified] : complains of \par 1. back pain : for at least 10 years\par 2 left knee pain \par 3 stiffness'\par 4. parkinson's is " so so" \par 5 anxiety worse at night\par 6 urinating every 2 hours \par

## 2022-02-08 NOTE — PHYSICAL EXAM
[Normal] : no joint swelling and grossly normal strength and tone [de-identified] : tremors cog wheel rigidity  [de-identified] : depressed mood

## 2022-02-16 LAB
ALBUMIN SERPL ELPH-MCNC: 4.4 G/DL
ALP BLD-CCNC: 63 U/L
ALT SERPL-CCNC: 6 U/L
ANION GAP SERPL CALC-SCNC: 13 MMOL/L
AST SERPL-CCNC: 12 U/L
BASOPHILS # BLD AUTO: 0.05 K/UL
BASOPHILS NFR BLD AUTO: 0.5 %
BILIRUB SERPL-MCNC: 0.2 MG/DL
BUN SERPL-MCNC: 21 MG/DL
CALCIUM SERPL-MCNC: 9.6 MG/DL
CHLORIDE SERPL-SCNC: 103 MMOL/L
CHOLEST SERPL-MCNC: 214 MG/DL
CO2 SERPL-SCNC: 22 MMOL/L
CREAT SERPL-MCNC: 0.85 MG/DL
EOSINOPHIL # BLD AUTO: 0.48 K/UL
EOSINOPHIL NFR BLD AUTO: 4.6 %
ESTIMATED AVERAGE GLUCOSE: 120 MG/DL
GLUCOSE SERPL-MCNC: 110 MG/DL
HBA1C MFR BLD HPLC: 5.8 %
HCT VFR BLD CALC: 43.4 %
HDLC SERPL-MCNC: 62 MG/DL
HGB BLD-MCNC: 13.4 G/DL
IMM GRANULOCYTES NFR BLD AUTO: 0.5 %
LDLC SERPL CALC-MCNC: 106 MG/DL
LYMPHOCYTES # BLD AUTO: 2.25 K/UL
LYMPHOCYTES NFR BLD AUTO: 21.6 %
MAN DIFF?: NORMAL
MCHC RBC-ENTMCNC: 30.2 PG
MCHC RBC-ENTMCNC: 30.9 GM/DL
MCV RBC AUTO: 97.7 FL
MONOCYTES # BLD AUTO: 0.91 K/UL
MONOCYTES NFR BLD AUTO: 8.7 %
NEUTROPHILS # BLD AUTO: 6.69 K/UL
NEUTROPHILS NFR BLD AUTO: 64.1 %
NONHDLC SERPL-MCNC: 152 MG/DL
PLATELET # BLD AUTO: 277 K/UL
POTASSIUM SERPL-SCNC: 4.4 MMOL/L
PROT SERPL-MCNC: 6.3 G/DL
PSA FREE FLD-MCNC: 20 %
PSA FREE SERPL-MCNC: 0.2 NG/ML
PSA SERPL-MCNC: 1.02 NG/ML
RBC # BLD: 4.44 M/UL
RBC # FLD: 13.2 %
SODIUM SERPL-SCNC: 138 MMOL/L
TRIGL SERPL-MCNC: 228 MG/DL
WBC # FLD AUTO: 10.43 K/UL

## 2022-03-30 ENCOUNTER — NON-APPOINTMENT (OUTPATIENT)
Age: 62
End: 2022-03-30

## 2022-04-12 ENCOUNTER — APPOINTMENT (OUTPATIENT)
Dept: NEUROLOGY | Facility: CLINIC | Age: 62
End: 2022-04-12
Payer: MEDICARE

## 2022-04-12 VITALS
HEIGHT: 73 IN | BODY MASS INDEX: 29.82 KG/M2 | WEIGHT: 225 LBS | SYSTOLIC BLOOD PRESSURE: 138 MMHG | DIASTOLIC BLOOD PRESSURE: 85 MMHG | HEART RATE: 71 BPM

## 2022-04-12 VITALS — TEMPERATURE: 97.9 F | BODY MASS INDEX: 29.82 KG/M2 | OXYGEN SATURATION: 98 % | WEIGHT: 225 LBS | HEIGHT: 73 IN

## 2022-04-12 PROCEDURE — 99214 OFFICE O/P EST MOD 30 MIN: CPT

## 2022-04-12 RX ORDER — ESCITALOPRAM OXALATE 10 MG/1
10 TABLET ORAL
Qty: 90 | Refills: 0 | Status: DISCONTINUED | COMMUNITY
Start: 2020-09-07 | End: 2022-04-12

## 2022-04-12 RX ORDER — MIRTAZAPINE 7.5 MG/1
7.5 TABLET, FILM COATED ORAL
Qty: 30 | Refills: 0 | Status: DISCONTINUED | COMMUNITY
Start: 2021-07-16 | End: 2022-04-12
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
## 2022-04-12 NOTE — DISCUSSION/SUMMARY
[FreeTextEntry1] : Brian Mitchell is a 61-year-old right-handed male with a diagnosis of Parkinson's disease since 2015.  On exam he is noted to have bilateral bradykinesia left worse than right.  Patient is reporting frequent off.  In spite of taking medications every 4 hours\par He also endorses significant anxiety for which he is following up with psychiatry, Dr. Lincoln-he is off mirtazapine currently on Lexapro and Klonopin\par MRI of the brain chronic microvascular changes\par Stalevo and Ongentys was not covered by their insurance\par In the past Azilect discontinued as he is on SSRI\par Impression- parkinsonism most likely idiopathic Parkinson's disease, anxiety\par Present they deny cognitive changes, rare hallucinations\par \par Plan\par -Rytary was discussed.  Wife states that they are in between insurances and she will check with insurance if this is covered\par Till then continue Sinemet 2 tablets with Comtan every 4 hours 4 times a day\par Continue Neupro patch 2 mg daily.  Side effects were discussed in detail.  Patient reports having rare visual hallucinations which are mild.  These were happening even before the patch.  Patch was not increased today\par \par Continues to follow-up with psychiatry clonazepam \par Patient will follow up with me in 3 months \par Declines PT OT and speech therapy at present time but will let me know once he has the new insurance\par All questions were addressed and answered\par \par

## 2022-04-25 ENCOUNTER — NON-APPOINTMENT (OUTPATIENT)
Age: 62
End: 2022-04-25

## 2022-05-10 ENCOUNTER — APPOINTMENT (OUTPATIENT)
Dept: INTERNAL MEDICINE | Facility: CLINIC | Age: 62
End: 2022-05-10
Payer: MEDICARE

## 2022-05-10 VITALS
HEIGHT: 73 IN | HEART RATE: 87 BPM | DIASTOLIC BLOOD PRESSURE: 63 MMHG | OXYGEN SATURATION: 95 % | WEIGHT: 219 LBS | SYSTOLIC BLOOD PRESSURE: 140 MMHG | BODY MASS INDEX: 29.03 KG/M2 | TEMPERATURE: 98.3 F

## 2022-05-10 LAB — HBA1C MFR BLD HPLC: 5.7

## 2022-05-10 PROCEDURE — 83036 HEMOGLOBIN GLYCOSYLATED A1C: CPT | Mod: QW

## 2022-05-10 PROCEDURE — 99214 OFFICE O/P EST MOD 30 MIN: CPT | Mod: 25

## 2022-05-10 RX ORDER — ENTACAPONE 200 MG/1
200 TABLET, FILM COATED ORAL AS DIRECTED
Qty: 120 | Refills: 5 | Status: DISCONTINUED | COMMUNITY
Start: 2021-06-25 | End: 2022-05-10

## 2022-05-10 RX ORDER — CARBIDOPA AND LEVODOPA 25; 100 MG/1; MG/1
25-100 TABLET ORAL
Qty: 540 | Refills: 3 | Status: DISCONTINUED | COMMUNITY
Start: 2021-09-28 | End: 2022-05-10

## 2022-05-10 NOTE — PHYSICAL EXAM
[Normal] : soft, non-tender, non-distended, no masses palpated, no HSM and normal bowel sounds [de-identified] : anxious

## 2022-05-10 NOTE — REVIEW OF SYSTEMS
[Nocturia] : nocturia [Unsteady Walk] : ataxia [Anxiety] : anxiety [Depression] : depression [Negative] : Cardiovascular [de-identified] : stiffness

## 2022-05-10 NOTE — ASSESSMENT
[FreeTextEntry1] : 1. Anxiety: worse with worsening parkinson's symptoms, to follow up with neuro and psych \par 2 Parkinson's  : per neuro : he has an unsteady gait, shuffling and requires a walker.He will require home nursing evaluation and care for  disease management and physical therapy to improve his gait and OT and speech therapy to improve speech and fine motor function Awaiting PT/OT\par 3 Hyperglycemia: repeat 5.7to 5.8, wt is up 2 lbs, to repeat  next today \par 4. HTN controlled \par 5. HCM: flu vaccine   , received both shingles , colon up to date, received covid x 3  , discussed # 4 \par 6. knee seen by ortho, to follow up for poss injections( Dr Maxwell ) , to follow up as his knees are still bothering him. \par 7. pain : now on gabapentin 100 mobic 7.5 \par 8 Covid : s/p pfizer x3 \par 9  follow up in 3 mos .

## 2022-05-10 NOTE — HISTORY OF PRESENT ILLNESS
[FreeTextEntry1] : here for follow up for his hyperglycemia  and Parkinson's [de-identified] : Slipped in the shower yesterday but no trauma. Workig with ins to get the PT and OT. But mentally  still complains of a lot of anxiety

## 2022-06-20 ENCOUNTER — NON-APPOINTMENT (OUTPATIENT)
Age: 62
End: 2022-06-20

## 2022-08-02 ENCOUNTER — APPOINTMENT (OUTPATIENT)
Dept: NEUROLOGY | Facility: CLINIC | Age: 62
End: 2022-08-02

## 2022-08-02 VITALS
BODY MASS INDEX: 29.42 KG/M2 | WEIGHT: 222 LBS | SYSTOLIC BLOOD PRESSURE: 130 MMHG | DIASTOLIC BLOOD PRESSURE: 85 MMHG | HEIGHT: 73 IN | HEART RATE: 76 BPM

## 2022-08-02 PROCEDURE — 99214 OFFICE O/P EST MOD 30 MIN: CPT
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
## 2022-08-02 NOTE — DISCUSSION/SUMMARY
[FreeTextEntry1] : Brian Mitchell is a 61-year-old right-handed male with a diagnosis of Parkinson's disease since 2015.  On exam he is noted to have bilateral bradykinesia left worse than right.  Patient is reporting frequent off.  In spite of taking medications every 4 hours\par He also endorses significant anxiety for which he is following up with psychiatry, Dr. Lincoln-he is off mirtazapine and Lexapro currently on  Klonopin\par MRI of the brain chronic microvascular changes\par Stalevo and Ongentys was not covered by their insurance\par In the past Azilect discontinued as he is on SSRI\par Impression- parkinsonism most likely idiopathic Parkinson's disease, anxiety\par Present they deny cognitive changes, rare hallucinations\par \par Plan\par -Increase Rytary 145 from 3 capsules 3 times a day to 4 capsules 3 times a day.  Patient will follow-up in 1 week.  If he continues to have wearing off of medication will increase to 4 capsules 4 times a day.  I had offered to change it to 195 mg however wife states that she just got a prescription filled.\par Continue Neupro patch 2 mg daily.  Side effects were discussed in detail.  Patient reports having rare visual hallucinations which are mild.  These were happening even before the patch.  Patch was not increased today\par Continues to follow-up with psychiatry, on clonazepam \par Offered speech therapy wishes to hold off for now\par Is interested in pursuing deep brain stimulation surgery, will schedule an appointment for levodopa challenge\par All questions were addressed and answered\par \par

## 2022-08-09 ENCOUNTER — NON-APPOINTMENT (OUTPATIENT)
Age: 62
End: 2022-08-09

## 2022-08-09 ENCOUNTER — APPOINTMENT (OUTPATIENT)
Dept: NEUROLOGY | Facility: CLINIC | Age: 62
End: 2022-08-09

## 2022-08-09 PROCEDURE — 99442: CPT

## 2022-09-01 ENCOUNTER — APPOINTMENT (OUTPATIENT)
Dept: NEUROLOGY | Facility: CLINIC | Age: 62
End: 2022-09-01

## 2022-09-01 VITALS
HEIGHT: 73 IN | DIASTOLIC BLOOD PRESSURE: 89 MMHG | BODY MASS INDEX: 29.42 KG/M2 | HEART RATE: 75 BPM | WEIGHT: 222 LBS | SYSTOLIC BLOOD PRESSURE: 147 MMHG

## 2022-09-01 PROCEDURE — 99215 OFFICE O/P EST HI 40 MIN: CPT

## 2022-09-01 NOTE — PHYSICAL EXAM
[1 - Slight: Loss of modulation, diction, or volume, but still all words easy to understand] : Speech - 1 [1 - Slight: Minimal masked facies manifested only by decreased frequency of blinking] : Facial expression - 1 [2 - Mild: Any of the following: a) 3 to 5 interruptions during tapping; b) mild slowing; c) the amplitude decrements midway in the 10-tap sequence.] : Finger tapping: left - 2 [1 - Slight: Any of the following: a) the regular rhythm is broken with one or two interruptions or hesitations of the movement; b) slight slowing; c) the amplitude decrements near the end of the task.] : Hand movements: right - 1 [0 - Normal: No problems.] : Leg agility: right - 0 [1 - Slight: Any of the following: a) the regular rhythm is broken with one or two interruptions or hesitations of the movement; b) slight slowing; c) amplitude decrements near the end of the task.] : Leg agility: left - 1 [0 - Normal: No problems. Able to arise quickly without hesitation] : Arise from chair - 0 [1 - Slight: Independent walking with minor gait impairment.] : Gait - 1 [1 - Slight: 3 - 5 steps, but subject recovers unaided.] : Postural stability - 1 [2 - Mild: Definite flexion, scoliosis or leaning to one side, but patient can correct posture to normal posture when asked to do so.] : Posture - 2 [2 - Mild: Mild global slowness and poverty of spontaneous movements.] : Body bradykinesia - 2 [2 - Mild: Loss of modulation, diction, or volume, with a few words unclear, but the overall sentences easy to follow] : Speech - 2 [2 - Mild: In addition to decreased eye-blink frequency, masked facies present in the lower face as well, namely fewer movements around the mouth, such as less spontaneous smiling, but lips not parted.] : Facial expression - 2 [1 - Slight: Rigidity only detected with activation maneuver] : Rigidity - Upper extremity: right - 1 [2 - Mild: Rigidity detected without the activation maneuver, but full range of motion is easily achieved.] : Rigidity - Upper extremity: left - 2 [0 - Normal: No rigidity] : Rigidity - Lower extremity: left - 0 [3 - Moderate: Any of the following: a) more than 5 interruptions during tapping or at least one longer arrest (freeze) in ongoing movement; b) moderate slowing; c) the amplitude decrements starting after the 1st tap.] : Finger tapping: left - 3 [2 - Mild: Any of the following: a) 3 to 5 interruptions during the movements; b) mild slowing; c) the amplitude decrements midway in the task.] : Hand movements: right - 2 [3 - Moderate: Any of the following: a) more than 5 interruptions during the movement or at least one longer arrest (freeze) in ongoing movement; b) moderate slowing; c) the amplitude decrements starting ater the 1st open-and-close sequence.] : Hand movements: left - 3 [2 - Mild: Any of the following: a) 3 to 5 interruptions during the movements; b) mild slowing; c) the amplitude decrements midway in the sequence.] : Pronation-supination movements of hands: right - 2 [3 - Moderate: Any of the following: a) more than 5 interruptions during the movement or at least one longer arrest (freeze) in ongoing movement; b) moderate slowing; c) the amplitude decrements starting after the 1st supination-pronation sequence.] : Pronation-supination movements of hands: left - 3 [3 - Moderate: Any of the following; a) more than 5 interruptions during the movement or at least one longer arrest (freeze) in ongoing movement; b) moderate slowing in speed; c) amplitude decrements after the 1st tap.] : Leg agility: right - 3 [4 - Severe: Cannot or can only barely perform the task because of slowing, interruptions, or decrements.] : Leg agility: left - 4 [4 - Severe: Unable to arise without help.] : Arise from chair - 4 [4 - Severe: Cannot walk at all or only with another person's assistance.] : Gait - 4 [3 - Moderate: Stands safely, but with absence of postural response; falls if not caught by examiner.] : Postural stability - 3 [3 - Moderate: Stooped posture, scoliosis or leaning to one side that cannot be corrected volitionally to a normal posture by the patient.] : Posture - 3 [4 - Severe: Severe global slowness and poverty of spontaneous movements.] : Body bradykinesia - 4 [1 - Slight: < 1 cm in maximal amplitude] : Rest tremor amplitude - Upper extremity: left - 1 [0 - Normal: No tremor.] : Rest tremor amplitude - Lower extremity: left - 0 [FreeTextEntry9] : September 1, 2022 [FreeTextEntry5] : 21 [FreeTextEntry6] : 49

## 2022-09-01 NOTE — DISCUSSION/SUMMARY
[FreeTextEntry1] : Brian Mitchell is a 62-year-old right-handed male with a diagnosis of Parkinson's disease since 2015.  On exam he is noted to have bilateral bradykinesia left worse than right.  Patient is reporting frequent offs, In spite of taking medications every 4 hours\par He also endorses significant anxiety for which he is following up with psychiatry, Dr. Lincoln-he is off mirtazapine and Lexapro currently on  Klonopin, feels more anxious in the office.\par MRI of the brain chronic microvascular changes\par Stalevo and Ongentys was not covered by their insurance\par In the past Azilect discontinued as he is on SSRI\par \par Impression- parkinsonism most likely idiopathic Parkinson's disease, anxiety\par Present they deny cognitive changes, rare hallucinations\par \par Plan\par \par Patient presented for levodopa challenge today.  There was significant improvement in motor score- OFF score- 49.  After 300 mg of levodopa, ON score 21.\par Patient was unable to get up from a chair or walk in the off state.  In the on state there was still persistent bradykinesia but his ability to get up and walk improved significantly\par From a motor standpoint patient will make a good candidate for DBS, will need neuropsych evaluation as well\par Procedure, anticipated risks and benefits were discussed with the patient and his wife in detail\par After neuropsych testing he will be referred to neurosurgery if he wishes to pursue DBS\par -Continue Rytary 145  4 capsules 3 times a day.\par - Continue Neupro patch 2 mg daily\par Continues to follow-up with psychiatry, on clonazepam \par All questions were addressed and answered\par Total face-to-face time spent with the patient and his wife today 60 minutes\par
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
## 2022-09-06 ENCOUNTER — APPOINTMENT (OUTPATIENT)
Dept: INTERNAL MEDICINE | Facility: CLINIC | Age: 62
End: 2022-09-06

## 2022-09-06 VITALS
HEIGHT: 72 IN | BODY MASS INDEX: 30.07 KG/M2 | WEIGHT: 222 LBS | HEART RATE: 84 BPM | DIASTOLIC BLOOD PRESSURE: 60 MMHG | SYSTOLIC BLOOD PRESSURE: 96 MMHG | OXYGEN SATURATION: 98 %

## 2022-09-06 PROCEDURE — 99213 OFFICE O/P EST LOW 20 MIN: CPT | Mod: 25

## 2022-09-06 PROCEDURE — G0008: CPT

## 2022-09-06 PROCEDURE — 90686 IIV4 VACC NO PRSV 0.5 ML IM: CPT

## 2022-09-06 NOTE — PHYSICAL EXAM
[Normal] : soft, non-tender, non-distended, no masses palpated, no HSM and normal bowel sounds [de-identified] : tends to let his neck hang down  [de-identified] : cog wheel rigidity  [de-identified] : less anxious

## 2022-09-06 NOTE — HISTORY OF PRESENT ILLNESS
[FreeTextEntry1] : here for follow up for his hyperglycemia  and hyperglycemia, HTN :  Parkinson's  and Anx / dep controlled by Neuro and psych  [de-identified] : Planning on going for DBS surgery, anxiety is much better controlled has routine follow up with psych and neuro

## 2022-09-06 NOTE — ASSESSMENT
[FreeTextEntry1] : 1. Anxiety: Has improved, felt to be secondary to the  with worsening parkinson's symptoms, to follow up with neuro and psych , w/u for DBS( deep brain stim) \par 2 Parkinson's  : per neuro : he has an unsteady gait, shuffling and requires a walker.as per # 1 \par 3 Hyperglycemia: repeat 5.7to 5.8 to 5.7 , wt is stable repeat next visit  \par 4. HTN controlled on no meds \par 5. HCM: flu vaccine today  , received both shingles , colon up to date, received covid x 3  , discussed # 4 \par 6. knee seen by ortho, to follow up for poss injections( Dr Maxwell ) , MRI this week, to follow up as his knees are still bothering him. \par 7. pain : now on gabapentin 100 mobic 7.5 \par 8 Covid : s/p pfizer x3 \par 9  follow up in 3 mos .

## 2022-09-27 ENCOUNTER — RX RENEWAL (OUTPATIENT)
Age: 62
End: 2022-09-27

## 2022-10-26 ENCOUNTER — EMERGENCY (EMERGENCY)
Facility: HOSPITAL | Age: 62
LOS: 1 days | Discharge: HOME | End: 2022-10-26
Admitting: EMERGENCY MEDICINE

## 2022-10-26 VITALS
TEMPERATURE: 98 F | RESPIRATION RATE: 16 BRPM | HEART RATE: 83 BPM | DIASTOLIC BLOOD PRESSURE: 79 MMHG | OXYGEN SATURATION: 95 % | SYSTOLIC BLOOD PRESSURE: 141 MMHG

## 2022-10-26 LAB
ALBUMIN SERPL ELPH-MCNC: 4.1 G/DL — SIGNIFICANT CHANGE UP (ref 3.3–5)
ALP SERPL-CCNC: 76 U/L — SIGNIFICANT CHANGE UP (ref 40–120)
ALT FLD-CCNC: <5 U/L — LOW (ref 4–41)
ANION GAP SERPL CALC-SCNC: 14 MMOL/L — SIGNIFICANT CHANGE UP (ref 7–14)
APPEARANCE UR: CLEAR — SIGNIFICANT CHANGE UP
AST SERPL-CCNC: 11 U/L — SIGNIFICANT CHANGE UP (ref 4–40)
BASOPHILS # BLD AUTO: 0.06 K/UL — SIGNIFICANT CHANGE UP (ref 0–0.2)
BASOPHILS NFR BLD AUTO: 0.5 % — SIGNIFICANT CHANGE UP (ref 0–2)
BILIRUB SERPL-MCNC: 0.3 MG/DL — SIGNIFICANT CHANGE UP (ref 0.2–1.2)
BILIRUB UR-MCNC: NEGATIVE — SIGNIFICANT CHANGE UP
BUN SERPL-MCNC: 19 MG/DL — SIGNIFICANT CHANGE UP (ref 7–23)
CALCIUM SERPL-MCNC: 9.2 MG/DL — SIGNIFICANT CHANGE UP (ref 8.4–10.5)
CHLORIDE SERPL-SCNC: 101 MMOL/L — SIGNIFICANT CHANGE UP (ref 98–107)
CO2 SERPL-SCNC: 24 MMOL/L — SIGNIFICANT CHANGE UP (ref 22–31)
COLOR SPEC: YELLOW — SIGNIFICANT CHANGE UP
CREAT SERPL-MCNC: 0.63 MG/DL — SIGNIFICANT CHANGE UP (ref 0.5–1.3)
DIFF PNL FLD: NEGATIVE — SIGNIFICANT CHANGE UP
EGFR: 108 ML/MIN/1.73M2 — SIGNIFICANT CHANGE UP
EOSINOPHIL # BLD AUTO: 0.04 K/UL — SIGNIFICANT CHANGE UP (ref 0–0.5)
EOSINOPHIL NFR BLD AUTO: 0.4 % — SIGNIFICANT CHANGE UP (ref 0–6)
GLUCOSE SERPL-MCNC: 114 MG/DL — HIGH (ref 70–99)
GLUCOSE UR QL: NEGATIVE — SIGNIFICANT CHANGE UP
HCT VFR BLD CALC: 44.8 % — SIGNIFICANT CHANGE UP (ref 39–50)
HGB BLD-MCNC: 14.2 G/DL — SIGNIFICANT CHANGE UP (ref 13–17)
IANC: 8.94 K/UL — HIGH (ref 1.8–7.4)
IMM GRANULOCYTES NFR BLD AUTO: 0.6 % — SIGNIFICANT CHANGE UP (ref 0–0.9)
KETONES UR-MCNC: ABNORMAL
LEUKOCYTE ESTERASE UR-ACNC: NEGATIVE — SIGNIFICANT CHANGE UP
LYMPHOCYTES # BLD AUTO: 1.36 K/UL — SIGNIFICANT CHANGE UP (ref 1–3.3)
LYMPHOCYTES # BLD AUTO: 12.1 % — LOW (ref 13–44)
MCHC RBC-ENTMCNC: 30 PG — SIGNIFICANT CHANGE UP (ref 27–34)
MCHC RBC-ENTMCNC: 31.7 GM/DL — LOW (ref 32–36)
MCV RBC AUTO: 94.7 FL — SIGNIFICANT CHANGE UP (ref 80–100)
MONOCYTES # BLD AUTO: 0.76 K/UL — SIGNIFICANT CHANGE UP (ref 0–0.9)
MONOCYTES NFR BLD AUTO: 6.8 % — SIGNIFICANT CHANGE UP (ref 2–14)
NEUTROPHILS # BLD AUTO: 8.94 K/UL — HIGH (ref 1.8–7.4)
NEUTROPHILS NFR BLD AUTO: 79.6 % — HIGH (ref 43–77)
NITRITE UR-MCNC: NEGATIVE — SIGNIFICANT CHANGE UP
NRBC # BLD: 0 /100 WBCS — SIGNIFICANT CHANGE UP (ref 0–0)
NRBC # FLD: 0 K/UL — SIGNIFICANT CHANGE UP (ref 0–0)
PH UR: 6.5 — SIGNIFICANT CHANGE UP (ref 5–8)
PLATELET # BLD AUTO: 325 K/UL — SIGNIFICANT CHANGE UP (ref 150–400)
POTASSIUM SERPL-MCNC: 3.9 MMOL/L — SIGNIFICANT CHANGE UP (ref 3.5–5.3)
POTASSIUM SERPL-SCNC: 3.9 MMOL/L — SIGNIFICANT CHANGE UP (ref 3.5–5.3)
PROT SERPL-MCNC: 6.8 G/DL — SIGNIFICANT CHANGE UP (ref 6–8.3)
PROT UR-MCNC: ABNORMAL
RBC # BLD: 4.73 M/UL — SIGNIFICANT CHANGE UP (ref 4.2–5.8)
RBC # FLD: 12.9 % — SIGNIFICANT CHANGE UP (ref 10.3–14.5)
SODIUM SERPL-SCNC: 139 MMOL/L — SIGNIFICANT CHANGE UP (ref 135–145)
SP GR SPEC: 1.03 — SIGNIFICANT CHANGE UP (ref 1.01–1.05)
UROBILINOGEN FLD QL: SIGNIFICANT CHANGE UP
WBC # BLD: 11.23 K/UL — HIGH (ref 3.8–10.5)
WBC # FLD AUTO: 11.23 K/UL — HIGH (ref 3.8–10.5)

## 2022-10-26 PROCEDURE — 99284 EMERGENCY DEPT VISIT MOD MDM: CPT
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
## 2022-10-26 NOTE — ED PROVIDER NOTE - NSFOLLOWUPINSTRUCTIONS_ED_ALL_ED_FT
Please follow up with your out patient psychiatric and medical team.   Please follow up with Dr Lincoln.   Make sure you keep your appointment with Dr Williams Haynes neuropsychiatrist in December.   Please keep take your medication as  prescribed by your team, no medication changes were made while you were in the emergency room.     There are no signs of emergency conditions requiring admission to the hospital on today's workup.  Based on the evaluation, a presumptive diagnosis was made, however, further evaluation may be required by your primary care physician or a specialist for a more definitive diagnosis.  Therefore, please follow-up as directed or return to the Emergency Department if your symptoms change or worsen.    We recommend that you:  See your primary care physician within the next 72 hours for follow up.  Bring a copy of your discharge paperwork (including any test results) to your doctor.        *** Return immediately if you have worsening symptoms, chest pain, Shortness of Breath, abdominal pain, Nausea/Vomiting/Diarrhea, dizziness, weakness, confusion, severe headache, vision changes, urinary symptoms, syncope, falls, trauma, discharge, bleeding, fevers, or any other new/concerning symptoms. ***

## 2022-10-26 NOTE — ED ADULT NURSE NOTE - OBJECTIVE STATEMENT
Pts wife called ems on him for , anxiety. wife states he has parkinson's and he is screaming all day . Pt calm on arrival, denying si,hi,ah,vh,etoh, drug use.

## 2022-10-26 NOTE — ED PROVIDER NOTE - CLINICAL SUMMARY MEDICAL DECISION MAKING FREE TEXT BOX
This is a 62 yr old M, [facility] Parkinson's with c/o anxiety, acting out behaviour, screaming. Patient reports he does not like to be by himself. a&ox 2 with unsteady gait.   Arrived with ems and wife Kiana ( [phone]), who reports this is his normal behaviour, now days. He is being followed by a psychiatrist and his pmd, has an upcoming appointment with jeimy vivar. Wife reports they need help but he does not want to sign papers, and his family is against her. They told her she has to deal with him. She is crying and emotional during collateral interview.  labs- r/o any electrolyte imbalance, ua r/o infection

## 2022-10-26 NOTE — ED BEHAVIORAL HEALTH NOTE - BEHAVIORAL HEALTH NOTE
Worker spoke to Yulia Hiren ([phone]) who states that she is having a hard time managing patient because of his increased behaviors. She states that it has been hard for her to leave the home to work because he is scared for no known reason. She states that the patient has Parkinson’s disease. She states that the patient refuses to sign over property or any assets. Patient is cleared medically. Worker provided a list of resources including care giver’s program through [facility], [employer], Medicaid contact numbers for [facility]. Wife was also encouraged to seek an elder . Wife will transport patient home.

## 2022-10-26 NOTE — ED PROVIDER NOTE - OBJECTIVE STATEMENT
This is a 62 yr old M, [facility] Parkinson's with c/o anxiety, acting out behaviour, screaming.   Arrived with ems and wife Kiana ( [phone]) This is a 62 yr old M, [facility] Parkinson's with c/o anxiety, acting out behaviour, screaming. Patient reports he does not like to be by himself. a&ox 2 with unsteady gait.   Arrived with ems and wife Kiana ( [phone]), who reports this is his normal behaviour, now days. He is being followed by a psychiatrist and his pmd, has an upcoming appointment with jeimy vivar. Wife reports they need help but he does not want to sign papers, and his family is against her. They told her she has to deal with him. She is crying and emotional during collateral interview.

## 2022-10-26 NOTE — ED PROVIDER NOTE - PATIENT PORTAL LINK FT
You can access the FollowMyHealth Patient Portal offered by [facility] by registering at the following website: [email or web address]. By joining [employer]’s FollowMyHealth portal, you will also be able to view your health information using other applications (apps) compatible with our system.

## 2022-10-27 ENCOUNTER — NON-APPOINTMENT (OUTPATIENT)
Age: 62
End: 2022-10-27

## 2022-10-27 LAB
CULTURE RESULTS: SIGNIFICANT CHANGE UP
SPECIMEN SOURCE: SIGNIFICANT CHANGE UP

## 2022-12-06 ENCOUNTER — APPOINTMENT (OUTPATIENT)
Dept: NEUROLOGY | Facility: CLINIC | Age: 62
End: 2022-12-06

## 2022-12-06 ENCOUNTER — APPOINTMENT (OUTPATIENT)
Dept: INTERNAL MEDICINE | Facility: CLINIC | Age: 62
End: 2022-12-06

## 2022-12-06 VITALS
RESPIRATION RATE: 15 BRPM | SYSTOLIC BLOOD PRESSURE: 132 MMHG | WEIGHT: 215 LBS | DIASTOLIC BLOOD PRESSURE: 85 MMHG | HEIGHT: 72 IN | BODY MASS INDEX: 29.12 KG/M2 | OXYGEN SATURATION: 98 % | HEART RATE: 75 BPM

## 2022-12-06 LAB
ALBUMIN SERPL ELPH-MCNC: 4.2 G/DL
ALP BLD-CCNC: 70 U/L
ALT SERPL-CCNC: <5 U/L
ANION GAP SERPL CALC-SCNC: 13 MMOL/L
AST SERPL-CCNC: 7 U/L
BILIRUB SERPL-MCNC: 0.4 MG/DL
BUN SERPL-MCNC: 18 MG/DL
CALCIUM SERPL-MCNC: 9.5 MG/DL
CHLORIDE SERPL-SCNC: 101 MMOL/L
CO2 SERPL-SCNC: 22 MMOL/L
CREAT SERPL-MCNC: 0.46 MG/DL
EGFR: 119 ML/MIN/1.73M2
GLUCOSE SERPL-MCNC: 107 MG/DL
POTASSIUM SERPL-SCNC: 4.4 MMOL/L
PROT SERPL-MCNC: 6.5 G/DL
SODIUM SERPL-SCNC: 137 MMOL/L

## 2022-12-06 PROCEDURE — 99214 OFFICE O/P EST MOD 30 MIN: CPT

## 2022-12-06 NOTE — DISCUSSION/SUMMARY
[FreeTextEntry1] : Brian Mitchell is a 62-year-old right-handed male with a diagnosis of Parkinson's disease since 2015.  On exam he is noted to have bilateral bradykinesia left worse than right.  Patient is reporting frequent off periods, in spite of taking medications every 4 hours\par He also endorses significant anxiety for which he is following up with psychiatry, Dr. Lincoln-\par MRI of the brain chronic microvascular changes\par Stalevo and Ongentys was not covered by their insurance\par In the past Azilect discontinued as he is on SSRI\par Levodopa challenge with good improvement in motor symptoms off score 49 on score 21\par \par Impression- parkinsonism most likely idiopathic Parkinson's disease, anxiety\par Present they deny cognitive changes, rare hallucinations\par \par Plan\par -Continue Rytary 145 3 capsules 4 times a day\par -Increase Neupro patch from 2 to 4 mg daily.  Side effects were discussed in detail.  Patient reports having rare visual hallucinations which are mild.  These were happening even before the patch.  \par Awaiting neuropsych evaluation interested in DBS\par Follow-up in 1 month\par All questions were addressed and answered\par \par

## 2022-12-06 NOTE — HISTORY OF PRESENT ILLNESS
[FreeTextEntry1] : here for follow up for his hyperglycemia  and hyperglycemia, HTN :  Parkinson's  and Anx / dep controlled by Neuro and psych  [de-identified] : Planning on going for DBS surgery, anxiety is much better controlled has routine follow up with psych and neuro , still getting anxious when he gets stiff, to see neuro after this appointment. \par In general his behavior is under better control

## 2022-12-06 NOTE — PHYSICAL EXAM
[Normal] : soft, non-tender, non-distended, no masses palpated, no HSM and normal bowel sounds [de-identified] : stiff, depressed mood. Shuffling gait

## 2022-12-06 NOTE — ASSESSMENT
[FreeTextEntry1] : 1. Anxiety: Has improved, felt to be secondary to the  with worsening parkinson's symptoms, to follow up with neuro and psych , w/u for DBS( deep brain stim) in process, awaitng appointment with neuropsych  \par 2 Parkinson's  : per neuro : he has an unsteady gait, shuffling and requires a walker.as per # 1 \par 3 Hyperglycemia: repeat 5.7to 5.8 to 5.7 , wt is stable down 7 lbs will cont to follow \par 4. HTN controlled on no meds \par 5. HCM: flu vaccine done  , received both shingles , colon up to date, received covid x 3  , discussed # 4 \par 6. knee seen by ortho, to follow up for poss injections( Dr Maxwell ) , MRI this week, to follow up as his knees are still bothering him. Received trigger point inj which that did not improve the pain, but likely the pain is more likely from the Parkinson's \par 7. pain : now on gabapentin 100 mobic 7.5 \par 8 Covid : s/p pfizer x3 \par 9  follow up in 3 mos .
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
## 2022-12-07 LAB
ESTIMATED AVERAGE GLUCOSE: 126 MG/DL
HBA1C MFR BLD HPLC: 6 %

## 2023-01-10 ENCOUNTER — NON-APPOINTMENT (OUTPATIENT)
Age: 63
End: 2023-01-10

## 2023-01-17 ENCOUNTER — APPOINTMENT (OUTPATIENT)
Dept: NEUROLOGY | Facility: CLINIC | Age: 63
End: 2023-01-17
Payer: MEDICARE

## 2023-01-17 VITALS
HEART RATE: 81 BPM | WEIGHT: 220 LBS | HEIGHT: 72 IN | BODY MASS INDEX: 29.8 KG/M2 | SYSTOLIC BLOOD PRESSURE: 115 MMHG | DIASTOLIC BLOOD PRESSURE: 71 MMHG

## 2023-01-17 PROBLEM — G20 PARKINSON'S DISEASE: Chronic | Status: ACTIVE | Noted: 2022-10-31

## 2023-01-17 PROCEDURE — 99214 OFFICE O/P EST MOD 30 MIN: CPT
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
## 2023-01-17 NOTE — DISCUSSION/SUMMARY
[FreeTextEntry1] : Brian Mitchell is a 62-year-old right-handed male with a diagnosis of Parkinson's disease since 2015.  On exam he is noted to have bilateral bradykinesia left worse than right.  Patient is reporting frequent off periods, in spite of taking medications every 4 hours\par He also endorses significant anxiety for which he is following up with psychiatry, Dr. Lnicoln-\par MRI of the brain chronic microvascular changes\par Stalevo and Ongentys was not covered by their insurance\par In the past Azilect discontinued as he is on SSRI\par Levodopa challenge with good improvement in motor symptoms off score 49 on score 21\par \par Impression- parkinsonism most likely idiopathic Parkinson's disease, anxiety\par Present they deny cognitive changes, rare hallucinations\par \par Plan\par -Continue Rytary 145 3 capsules 4 times a day\par -continue Neupro patch  4 mg daily.  \par Awaiting neuropsych evaluation interested in DBS, referral to neurosurgery\par Follow-up in  2 month\par All questions were addressed and answered\par \par

## 2023-03-06 ENCOUNTER — APPOINTMENT (OUTPATIENT)
Dept: NEUROLOGY | Facility: CLINIC | Age: 63
End: 2023-03-06
Payer: MEDICARE

## 2023-03-06 ENCOUNTER — APPOINTMENT (OUTPATIENT)
Age: 63
End: 2023-03-06

## 2023-03-06 PROCEDURE — 96116 NUBHVL XM PHYS/QHP 1ST HR: CPT

## 2023-03-06 PROCEDURE — 96132 NRPSYC TST EVAL PHYS/QHP 1ST: CPT

## 2023-03-06 PROCEDURE — 96138 PSYCL/NRPSYC TECH 1ST: CPT | Mod: NC

## 2023-03-06 PROCEDURE — 96133 NRPSYC TST EVAL PHYS/QHP EA: CPT

## 2023-03-06 PROCEDURE — 96139 PSYCL/NRPSYC TST TECH EA: CPT | Mod: NC

## 2023-03-13 ENCOUNTER — APPOINTMENT (OUTPATIENT)
Age: 63
End: 2023-03-13

## 2023-03-13 ENCOUNTER — APPOINTMENT (OUTPATIENT)
Dept: NEUROLOGY | Facility: CLINIC | Age: 63
End: 2023-03-13
Payer: MEDICARE

## 2023-03-13 PROCEDURE — 96139 PSYCL/NRPSYC TST TECH EA: CPT | Mod: NC

## 2023-03-13 PROCEDURE — 96133 NRPSYC TST EVAL PHYS/QHP EA: CPT

## 2023-03-28 ENCOUNTER — APPOINTMENT (OUTPATIENT)
Dept: NEUROLOGY | Facility: CLINIC | Age: 63
End: 2023-03-28
Payer: MEDICARE

## 2023-03-28 VITALS
SYSTOLIC BLOOD PRESSURE: 135 MMHG | HEART RATE: 87 BPM | DIASTOLIC BLOOD PRESSURE: 84 MMHG | HEIGHT: 72 IN | WEIGHT: 221 LBS | BODY MASS INDEX: 29.93 KG/M2

## 2023-03-28 PROCEDURE — 99215 OFFICE O/P EST HI 40 MIN: CPT
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
## 2023-03-28 NOTE — DISCUSSION/SUMMARY
[FreeTextEntry1] : Brian Mitchell is a 62-year-old right-handed male with a diagnosis of Parkinson's disease since 2015.  On exam he is noted to have bilateral bradykinesia left worse than right.  Patient is reporting frequent off periods, in spite of taking medications every 4 hours\par He also endorses significant anxiety for which he is following up with psychiatry, Dr. Lincoln-\par MRI of the brain chronic microvascular changes\par Stalevo and Ongentys was not covered by their insurance\par In the past Azilect discontinued as he is on SSRI\par Levodopa challenge with good improvement in motor symptoms off score 49 on score 21\par Neuropsych evaluation-in conclusion impairment across and within most aspects of cognitive processing, accompanied by changes in activities of daily living can be best described as mild to moderate dementia.  Given history and presentation Parkinson's diseases primary etiological consideration.  However given the diffuse nature of his cognitive difficulties with evidence of rapid forgetting the presence of another neurodegenerative disorder is also possible.  That said there was a likely significant contribution of mood during the exam resulting in fluctuating engagement.  Thus certain scores may be under representation of his functioning.\par \par Impression- parkinsonism most likely idiopathic Parkinson's disease, anxiety\par Present they deny cognitive changes, rare hallucinations-MMSE 20 out of 30, neuropsych testing mild to moderate dementia but also fluctuating effort due to mood during exam\par \par Plan\par -Trial of Inbrija as rescue medication during off times.'s\par -Continue Rytary 145 3 capsules 4 times a day\par -continue Neupro patch  4 mg daily.  \par Follow-up with psychiatry for better management of anxiety/depression\par Had an extensive discussion about neuropsych testing.  Also discussed in depth about increased risk of cognitive decline post DBS in someone with pre-existing dementia\par Follow-up in  3 month\par All questions were addressed and answered\par 45 minutes spent face-to-face with the patient and his wife discussing above\par

## 2023-03-28 NOTE — PHYSICAL EXAM
[FreeTextEntry1] : On exam patient is awake and alert.  He is pleasant cooperative with the exam.\par Face is symmetric tongue and uvula midline and symmetric.  Speech is soft but easy to understand\par Extra ocular movements are intact\par No resting action or postural tremors are seen\par Bilateral bradykinesia\par Hand opening and closing 1 on the right 2 on the left\par Rapid alternating movements 1 on the right 2 on the left\par Finger taps 1 bilaterally left slightly slower\par Leg agility 1 bilaterally\par Curling of toes on both feet is seen\par Slow to get up from the chair but able to do so in the first attempt\par Posture is stooped and scoliotic\par Small steps but able to walk independently\par Strength 5/5 plantars are downgoing\par Slight tilting of head to the right, drooling\par Ambulating with walker.  While leaving the room patient forgot his walker behind.\par \par MMSE 20 out of 30\par Patient not aware of the exact date, unable to do serial sevens got 1 out of 5, recall 1 out of 3.  Had difficulty drawing the design writing a sentence and repeating\par

## 2023-04-11 ENCOUNTER — LABORATORY RESULT (OUTPATIENT)
Age: 63
End: 2023-04-11

## 2023-04-11 ENCOUNTER — APPOINTMENT (OUTPATIENT)
Dept: INTERNAL MEDICINE | Facility: CLINIC | Age: 63
End: 2023-04-11
Payer: MEDICARE

## 2023-04-11 VITALS
SYSTOLIC BLOOD PRESSURE: 112 MMHG | TEMPERATURE: 97.3 F | WEIGHT: 221 LBS | OXYGEN SATURATION: 95 % | HEART RATE: 81 BPM | BODY MASS INDEX: 29.93 KG/M2 | HEIGHT: 72 IN | DIASTOLIC BLOOD PRESSURE: 66 MMHG

## 2023-04-11 DIAGNOSIS — Z00.00 ENCOUNTER FOR GENERAL ADULT MEDICAL EXAMINATION W/OUT ABNORMAL FINDINGS: ICD-10-CM

## 2023-04-11 DIAGNOSIS — Z20.822 CONTACT WITH AND (SUSPECTED) EXPOSURE TO COVID-19: ICD-10-CM

## 2023-04-11 PROCEDURE — G0439: CPT

## 2023-04-11 RX ORDER — ESCITALOPRAM OXALATE 20 MG/1
20 TABLET ORAL
Refills: 0 | Status: ACTIVE | COMMUNITY
Start: 2022-04-12

## 2023-04-11 RX ORDER — MELOXICAM 7.5 MG/1
7.5 TABLET ORAL
Refills: 0 | Status: DISCONTINUED | COMMUNITY
End: 2023-04-11

## 2023-04-11 NOTE — REVIEW OF SYSTEMS
[Incontinence] : incontinence [Joint Stiffness] : joint stiffness [Muscle Weakness] : muscle weakness [Headache] : no headache [Dizziness] : no dizziness [Fainting] : no fainting [Confusion] : confusion [Unsteady Walk] : ataxia [Memory Loss] : memory loss [Suicidal] : not suicidal [Anxiety] : anxiety [Depression] : depression [Negative] : Heme/Lymph

## 2023-04-11 NOTE — PHYSICAL EXAM
[No Acute Distress] : no acute distress [Well-Appearing] : well-appearing [Normal Voice/Communication] : normal voice/communication [No Carotid Bruits] : no carotid bruits [Pedal Pulses Present] : the pedal pulses are present [No Edema] : there was no peripheral edema [Normal Supraclavicular Nodes] : no supraclavicular lymphadenopathy [No Joint Swelling] : no joint swelling [Normal] : affect was normal and insight and judgment were intact [de-identified] : gross muscle weakness of upper and lower extremities as well as core; neck stiffness

## 2023-04-11 NOTE — HISTORY OF PRESENT ILLNESS
[Spouse] : spouse [de-identified] : 62 y.o. male, PMHx anxiety, depression, hypertension, Parkinson's.  \par Presents for CPE/wellness visit.  \par Continues to struggle with management of Parkinson's. Meds seems to wear off before next dose is due. Continues neuro follow up.  May try an inhalant for rescue, if approved.  Also considering DBS, planning to schedule with surgeon to discuss.  Aware of possible hastening of dementia, but feels his quality of life at this point is quite poor that he and wife would prefer to improve his physical function. Patient has been resistant to outside help, but more open to the idea now.  Requests referral to [facility], which has helped with PT/OT assessment and referrals in the past.  \par Walks with walker most of the time, sometime goes without in the house.  Can do his own bathing, dressing, toileting, feeding most days, sometimes needs help.  Needs assistance with food prep.  Lots of neck stiffness.  Incontinent of urine, wears diapers.  Wife drives him to appointments and does the shopping, he will sometimes go out with her to the store.  Occasionally will go into the store on his own and can do simple transactions.  \par  \par

## 2023-04-11 NOTE — ASSESSMENT
[FreeTextEntry1] : Anxiety, depression:\par Continues f/u with psychiatry (Dr. Lincoln) and psychology \par \par Hypertension:\par Off meds, controlled\par \par Parkinson's:\par Still with locking/freezing in between medication doses \par Hope to start Inbrija inhalation for rescue, close f/u with neurology\par Still considering DPS\par Home on his own through the day, wife works, but comes home for lunch.  Wife organizes meds and sets up for him, he sometimes forgets to take them.  \par Interested in restarting OT/PT.  \par Needs some assistance with ADLs, needs assistance with most IADLs. \par Walks with the walker most times.  \par Referral to [facility] as requested.  \par \par HM:\par CRS - colonoscopy 4/2019, repeat 5 years (next year)\par Skin cancer screening - needs derm f/u (reports large area removed on the right side of the face ~20 yr. ago)\par Dental - UTD, no issues \par tdap UTD - 2020\par Shingrix vaccine - done x 2 \par Flu shot done \par COVID vaccine done with booster x 2 \par check labs\par 
15-Nov-2021 08:52

## 2023-04-11 NOTE — HEALTH RISK ASSESSMENT
[Good] : ~his/her~  mood as  good [No] : In the past 12 months have you used drugs other than those required for medical reasons? No [Medical reason not done] : Medical reason not done [With Significant Other] : lives with significant other [Retired] : retired [Independent] : using telephone [Some assistance needed] : using transportation [Full assistance needed] : managing finances [Smoke Detector] : smoke detector [Carbon Monoxide Detector] : carbon monoxide detector [With Patient/Caregiver] : , with patient/caregiver [Designated Healthcare Proxy] : Designated healthcare proxy [Relationship: ___] : Relationship: [unfilled] [Aggressive treatment] : aggressive treatment [Never] : Never [No falls in past year] : Patient reported no falls in the past year [I have developed a follow-up plan documented below in the note.] : I have developed a follow-up plan documented below in the note. [de-identified] : ongoing treatment with psychiatry  [] :  [Reports changes in hearing] : Reports no changes in hearing [Reports changes in vision] : Reports no changes in vision [Reports changes in dental health] : Reports no changes in dental health [Guns at Home] : no guns at home [AdvancecareDate] : 04/23

## 2023-04-12 LAB
ALBUMIN SERPL ELPH-MCNC: 4.4 G/DL
ALP BLD-CCNC: 74 U/L
ALT SERPL-CCNC: 5 U/L
ANION GAP SERPL CALC-SCNC: 17 MMOL/L
APPEARANCE: CLEAR
AST SERPL-CCNC: 13 U/L
BASOPHILS # BLD AUTO: 0.03 K/UL
BASOPHILS NFR BLD AUTO: 0.3 %
BILIRUB SERPL-MCNC: 0.5 MG/DL
BILIRUBIN URINE: NEGATIVE
BLOOD URINE: NEGATIVE
BUN SERPL-MCNC: 22 MG/DL
CALCIUM SERPL-MCNC: 9.5 MG/DL
CHLORIDE SERPL-SCNC: 103 MMOL/L
CHOLEST SERPL-MCNC: 218 MG/DL
CO2 SERPL-SCNC: 20 MMOL/L
COLOR: NORMAL
CREAT SERPL-MCNC: 0.59 MG/DL
EGFR: 110 ML/MIN/1.73M2
EOSINOPHIL # BLD AUTO: 0.17 K/UL
EOSINOPHIL NFR BLD AUTO: 1.8 %
ESTIMATED AVERAGE GLUCOSE: 126 MG/DL
GLUCOSE QUALITATIVE U: NEGATIVE MG/DL
GLUCOSE SERPL-MCNC: 105 MG/DL
HBA1C MFR BLD HPLC: 6 %
HCT VFR BLD CALC: 44.5 %
HDLC SERPL-MCNC: 61 MG/DL
HGB BLD-MCNC: 13.7 G/DL
IMM GRANULOCYTES NFR BLD AUTO: 0.3 %
KETONES URINE: ABNORMAL MG/DL
LDLC SERPL CALC-MCNC: 141 MG/DL
LEUKOCYTE ESTERASE URINE: ABNORMAL
LYMPHOCYTES # BLD AUTO: 2.53 K/UL
LYMPHOCYTES NFR BLD AUTO: 27.3 %
MAN DIFF?: NORMAL
MCHC RBC-ENTMCNC: 30 PG
MCHC RBC-ENTMCNC: 30.8 GM/DL
MCV RBC AUTO: 97.4 FL
MONOCYTES # BLD AUTO: 0.77 K/UL
MONOCYTES NFR BLD AUTO: 8.3 %
NEUTROPHILS # BLD AUTO: 5.73 K/UL
NEUTROPHILS NFR BLD AUTO: 62 %
NITRITE URINE: NEGATIVE
NONHDLC SERPL-MCNC: 158 MG/DL
PH URINE: 6
PLATELET # BLD AUTO: 287 K/UL
POTASSIUM SERPL-SCNC: 4.4 MMOL/L
PROT SERPL-MCNC: 6.6 G/DL
PROTEIN URINE: 30 MG/DL
PSA SERPL-MCNC: 0.94 NG/ML
RBC # BLD: 4.57 M/UL
RBC # FLD: 14.3 %
SODIUM SERPL-SCNC: 140 MMOL/L
SPECIFIC GRAVITY URINE: 1.04
TRIGL SERPL-MCNC: 82 MG/DL
TSH SERPL-ACNC: 1.81 UIU/ML
UROBILINOGEN URINE: 0.2 MG/DL
WBC # FLD AUTO: 9.26 K/UL

## 2023-04-14 ENCOUNTER — NON-APPOINTMENT (OUTPATIENT)
Age: 63
End: 2023-04-14

## 2023-04-18 ENCOUNTER — APPOINTMENT (OUTPATIENT)
Dept: NEUROLOGY | Facility: CLINIC | Age: 63
End: 2023-04-18
Payer: MEDICARE

## 2023-04-18 VITALS
HEART RATE: 79 BPM | HEIGHT: 72 IN | WEIGHT: 221 LBS | DIASTOLIC BLOOD PRESSURE: 90 MMHG | SYSTOLIC BLOOD PRESSURE: 146 MMHG | BODY MASS INDEX: 29.93 KG/M2

## 2023-04-18 PROCEDURE — 99215 OFFICE O/P EST HI 40 MIN: CPT

## 2023-04-20 NOTE — DISCUSSION/SUMMARY
[FreeTextEntry1] : Mr Simpson is a 61 yo right handed male with PD x 8 years. Symptoms first started as slowness, stiffness and change in gait. He has severe unpredictable motor fluctuations. Non-motor symptoms are more severe in the off state. His speech is much more garbled, drooling and anxiety is more severe. He completed neuropsychological testing which recommended anxiety treatment optimization. His case will be discussed by DBS providers to determine risk vs benefit of implantation. \par \par On today's capsit he demonstrated  a 49% improvement in his MDS-UPDRS part III assessment. \par \par Patient was counseled on the following recommendations\par -Consider botox injections for siallorhea at next visit with Dr Gil\par -Continue LD regimen

## 2023-04-20 NOTE — HISTORY OF PRESENT ILLNESS
[FreeTextEntry1] : Capsit \par \par 62 year male with PD since 2015 presents for pre-surgical testing. Patient experiences motor fluctuations including levodopa-sensitive off symptoms, limb bradykinesia, dystonia and impaired gait.\par \par Last dose of levodopa was taken yesterday at 11pm \par \par Nonmotor:\par -Some difficulty swallowing pills when off, No RBD symptoms \par -Sleep is limited by wearing off at 4am \par -Denies VH\par -Forgetful\par -Mood is anxious, treated by psychiatrist every 2-6 weeks, sees SW q 2 weeks for therapy\par +panic attacks monthly, have become less frequent\par +sialorrhea\par +Urinary incontinence and frequency started 6 months to 1 year ago\par -Daily BMs  \par \par Current meds:\par rytary 145mg 3 capsules QID 8-12-4-9\par Neupro 4mg qd\par \par Dr rToy Lincoln psychiatrist\par \par Gabapentin 100mg 200mg \par escitalopram 20mg\par Clonazapam 0.5mg BID PRN\par

## 2023-04-20 NOTE — PHYSICAL EXAM
[General Appearance - Alert] : alert [General Appearance - In No Acute Distress] : in no acute distress [Person] : oriented to person [Place] : oriented to place [Time] : disoriented to time [FreeTextEntry1] : OFF state - MDS UPDRS III score:  63\par \par +4 speech and +4 masked macies with lips parted and severe sialorrhea. Patient has +1 postural and action tremors that are worse on the left. 0 rest tremor. There is L>R bradykinesia with EVA assessment, leg agility and toe tapping. Rigidity was palpated on all extremities, +3 on the neck. Patient was unable to stand without assistance. Gait was abnormal, required a rollator. Posture was stooped \par \par   \par \par ON state - MDS UPRDS III score:  32\par \par It took 1 hour for 300mg of LD to take effect \par \par Improved speech and facial expression. Reduction in action tremor. Bradykinesia and rigidity demonstrated improvement. Patient was able to stand quickly without assistance, gait was mildly impaired with reduced SL and decreased left arm swing. \par  \par MDS UPRDS I: 21\par MDS UPRDS II: 26\par MDS UPRDS IV: 8\par

## 2023-07-06 ENCOUNTER — NON-APPOINTMENT (OUTPATIENT)
Age: 63
End: 2023-07-06

## 2023-08-29 ENCOUNTER — APPOINTMENT (OUTPATIENT)
Dept: NEUROSURGERY | Facility: CLINIC | Age: 63
End: 2023-08-29
Payer: MEDICARE

## 2023-08-29 ENCOUNTER — APPOINTMENT (OUTPATIENT)
Dept: NEUROLOGY | Facility: CLINIC | Age: 63
End: 2023-08-29
Payer: MEDICARE

## 2023-08-29 ENCOUNTER — NON-APPOINTMENT (OUTPATIENT)
Age: 63
End: 2023-08-29

## 2023-08-29 VITALS
SYSTOLIC BLOOD PRESSURE: 106 MMHG | HEIGHT: 74 IN | BODY MASS INDEX: 23.74 KG/M2 | WEIGHT: 185 LBS | HEART RATE: 79 BPM | OXYGEN SATURATION: 95 % | DIASTOLIC BLOOD PRESSURE: 68 MMHG

## 2023-08-29 VITALS
SYSTOLIC BLOOD PRESSURE: 142 MMHG | HEIGHT: 74 IN | HEART RATE: 86 BPM | BODY MASS INDEX: 23.74 KG/M2 | WEIGHT: 185 LBS | DIASTOLIC BLOOD PRESSURE: 80 MMHG

## 2023-08-29 PROCEDURE — 99205 OFFICE O/P NEW HI 60 MIN: CPT

## 2023-08-29 PROCEDURE — 99214 OFFICE O/P EST MOD 30 MIN: CPT

## 2023-08-29 RX ORDER — LEVODOPA 42 MG/1
42 CAPSULE RESPIRATORY (INHALATION)
Qty: 300 | Refills: 3 | Status: DISCONTINUED | COMMUNITY
Start: 2023-03-28 | End: 2023-08-29

## 2023-08-30 NOTE — DISCUSSION/SUMMARY
[FreeTextEntry1] : Brian Mitchell is a 63-year-old right-handed male with a diagnosis of Parkinson's disease since 2015.  On exam he is noted to have bilateral bradykinesia left worse than right.  Patient is reporting frequent off periods, in spite of taking medications every 4 hours He also endorses significant anxiety for which he is following up with psychiatry, Dr. Lincoln- MRI of the brain chronic microvascular changes Stalevo and Ongentys was not covered by their insurance In the past Azilect discontinued as he is on SSRI Levodopa challenge with good improvement in motor symptoms off score 49 on score 21 Neuropsych evaluation-in conclusion impairment across and within most aspects of cognitive processing, accompanied by changes in activities of daily living can be best described as mild to moderate dementia.  Given history and presentation Parkinson's diseases primary etiological consideration.  However given the diffuse nature of his cognitive difficulties with evidence of rapid forgetting the presence of another neurodegenerative disorder is also possible.  That said there was a likely significant contribution of mood during the exam resulting in fluctuating engagement.  Thus certain scores may be under representation of his functioning.  Impression-  idiopathic Parkinson's disease, anxiety Present they deny cognitive changes, rare hallucinations-MMSE 20 out of 30, neuropsych testing mild to moderate dementia but also fluctuating effort due to mood during exam inbrija was not covered Plan -Increase  Rytary 145 from 3 to 4 capsules 4 times a day -continue Neupro patch  4 mg daily.   -unable to get the Inbrija Follow-up with psychiatry for better management of anxiety/depression Had an extensive discussion about neuropsych testing.  Also discussed in depth about increased risk of cognitive decline post DBS in someone with pre-existing dementia Follow-up in  3 month All questions were addressed and answered 30 minutes spent face-to-face with the patient and his wife discussing above  Angelia Cook MD Movement Disorder fellow   patient seen and discussed with attending

## 2023-08-30 NOTE — PHYSICAL EXAM
[FreeTextEntry1] : On exam patient is awake and alert.  He is pleasant cooperative with the exam. Face is symmetric tongue and uvula midline and symmetric.  Speech is soft but easy to understand Extra ocular movements are intact No resting action or postural tremors are seen Bilateral bradykinesia Hand opening and closing 2 on the right 3 on the left Rapid alternating movements 2  on the right 3 on the left Finger taps 3 bilaterally left slightly slower Leg agility 3 bilaterally Curling of toes on both feet is seen Slow to get up from the chair but able to do so in the first attempt Posture is stooped and scoliotic Small steps but able to walk independently Strength 5/5 plantars are downgoing Slight tilting of head to the right, drooling Ambulating with walker.  While leaving the room patient forgot his walker behind.  MMSE 20 out of 30 Patient not aware of the exact date, unable to do serial sevens got 1 out of 5, recall 1 out of 3.  Had difficulty drawing the design writing a sentence and repeating
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
## 2023-09-05 NOTE — ASSESSMENT
[FreeTextEntry1] : IMPRESSION: 63M with PMH of severe Parkinson's disease, HTN, depression, anxiety. No AC/AP. He presents for DBS consult. He was diagnosed with PD in 2015. He has severe on/off motor fluctuations. Daily wearing off of medication. He has severe stiffness and slowness, and freezing when he wears off 1-2 hours after taking medication. He is currently taking Rytary 145 mg 3 capsules QID (8-12-4-9), and Neupro patch 4 mg daily. He completed CAPSIT 4/1823. He had neuropsychological evaluation 3/6/23. His PD symptoms are affecting his QOL and he is seeking advanced treatment.   I have discussed this patient's symptoms with them. I have discussed how the motor symptoms of the patients Parkinson's disease is disabling. The symptoms significantly interfere with their quality of life. I think that this patient is a good candidate DBS. Discussed pt at DBS meeting with movement disorder team. It was deemed unilateral left GPi-DBS would be performed, and the right side may be done at later date. He has had inadequate control of his motor symptoms for >6 months with the use of medication at the maximum dose, which has also caused him disabling side effects that led him to not tolerate such high dose of medication.  I have discussed in detail the process of the surgery. I discussed that the surgery will take place in two different stages, Stage I and Stage II that it will take place at [facility]. The patient would be under local anesthesia and would have a stereotactic frame placed around the head prior to having a head CT stealth done before proceeding with the placement of the electrodes. The patient would be awake during stage 1 surgery to participate in assessments in order to optimize placement of the electrode for greatest benefit and smallest side effect. The lead can then be adjusted in the operating room if needed. The patient would have a routine post op head CT again after surgery and would spend the night, and would likely go home the following day with PO pain meds, and about a week of scheduled antibiotics.  The patient would return to the hospital ~ 2 weeks later for the Stage II procedure, which would be the subcutaneous implantation of the pulse generator and lead extension and would be done under general anesthesia. This would be an outpatient procedure and the patient would go home that same day along with pain meds and scheduled antibiotics for ~1 week.  I discussed the risk and benefits of the procedure including bleeding, infection, seizures, stroke, heart or lung issues, no benefit, the need for reoperations, hardware malfunction or failure, and sustained side effects including paralysis, paresthesias, eye deviations, mood, changes, and voice changes. I also discussed the need for several programing sessions afterwards.  The patient shows good understanding of the procedure, the risk and benefits, and wishes to proceed with the procedure.  PLAN: Will order the following imaging for surgical planning, target determination, and tractography: CT brain, stealth 3T Siemens Roopa, Research magnet, MRI brain with/without contrast, DBS protocol  Jose  Surgical coordination team will contact regarding surgical dates PST needed  I, Dr. Addison Garcia, personally performed the evaluation and management (E/M) services for this new patient.  That E/M includes conducting the clinically appropriate initial history &/or exam, assessing all conditions, and establishing the plan of care.  Today, my YAMILEX, Entreda, was here to observe my evaluation and management service for this patient & follow plan of care established by me going forward.

## 2023-09-05 NOTE — HISTORY OF PRESENT ILLNESS
[> 3 months] : more  than 3 months [FreeTextEntry1] : Parkinson's disease [de-identified] : EJ ROJAS is a 63 year old right handed male with PMH of severe Parkinson's disease, HTN, anxiety. No anticoagulants or antiplatelets.  He presents for neurosurgical consultation for DBS. He is under the care of movement disorder neurologist Dr. Gil. He was diagnosed with PD in 2015 when he was having dragging of left leg, and difficulty moving. He was started on levodopa and it worked well and he was able to work for additional 2 years. He now has b/l stiffness, and slowness, but no tremor. Also experiencing neck discomfort and tilting of his neck forward. He has severe on/off motor fluctuations. Denies dyskinesias. He has sudden severe wearing offs of medications. He has wearing offs daily. Effect of medication lasts 1-2 hours then he has severe stiffness and slowness. He moves neck better when he takes the medication. He experiencing freezing of gait, and he can't speak or think at those times. Denies hallucinations, urinary frequency, constipation. Has occasional mood fluctuations, dysphagia with some foods, medication, and liquids during off periods, drooling, Mostly independently with dressing, feeding, but sometimes needs help. He states his balance is mostly good, he stumbles. He has been using a Rollator for years. He completed CAPSIT 4/18/23. He had neuropsychological evaluation 3/6/23.  Current Parkinson's medication: Rytary 145 mg 4 capsules QID (8-12-4-9) Neupro patch 4 mg daily

## 2023-09-19 ENCOUNTER — APPOINTMENT (OUTPATIENT)
Dept: INTERNAL MEDICINE | Facility: CLINIC | Age: 63
End: 2023-09-19
Payer: MEDICARE

## 2023-09-19 ENCOUNTER — MED ADMIN CHARGE (OUTPATIENT)
Age: 63
End: 2023-09-19

## 2023-09-19 VITALS
SYSTOLIC BLOOD PRESSURE: 122 MMHG | RESPIRATION RATE: 16 BRPM | BODY MASS INDEX: 28.88 KG/M2 | WEIGHT: 225 LBS | HEART RATE: 78 BPM | HEIGHT: 74 IN | DIASTOLIC BLOOD PRESSURE: 80 MMHG | OXYGEN SATURATION: 97 %

## 2023-09-19 DIAGNOSIS — Z23 ENCOUNTER FOR IMMUNIZATION: ICD-10-CM

## 2023-09-19 DIAGNOSIS — R45.89 OTHER SYMPTOMS AND SIGNS INVOLVING EMOTIONAL STATE: ICD-10-CM

## 2023-09-19 PROCEDURE — 99213 OFFICE O/P EST LOW 20 MIN: CPT

## 2023-09-19 RX ORDER — CLONAZEPAM 0.5 MG/1
0.5 TABLET ORAL
Refills: 0 | Status: ACTIVE | COMMUNITY

## 2023-09-28 ENCOUNTER — APPOINTMENT (OUTPATIENT)
Dept: MRI IMAGING | Facility: HOSPITAL | Age: 63
End: 2023-09-28

## 2023-09-28 ENCOUNTER — RESULT REVIEW (OUTPATIENT)
Age: 63
End: 2023-09-28

## 2023-09-28 ENCOUNTER — APPOINTMENT (OUTPATIENT)
Dept: CT IMAGING | Facility: HOSPITAL | Age: 63
End: 2023-09-28

## 2023-09-28 ENCOUNTER — OUTPATIENT (OUTPATIENT)
Dept: OUTPATIENT SERVICES | Facility: HOSPITAL | Age: 63
LOS: 1 days | End: 2023-09-28
Payer: MEDICARE

## 2023-09-28 DIAGNOSIS — G20 PARKINSON'S DISEASE: ICD-10-CM

## 2023-09-28 DIAGNOSIS — Z00.00 ENCOUNTER FOR GENERAL ADULT MEDICAL EXAMINATION WITHOUT ABNORMAL FINDINGS: ICD-10-CM

## 2023-09-28 PROCEDURE — 70551 MRI BRAIN STEM W/O DYE: CPT | Mod: 26

## 2023-09-28 PROCEDURE — 70450 CT HEAD/BRAIN W/O DYE: CPT

## 2023-09-28 PROCEDURE — 70450 CT HEAD/BRAIN W/O DYE: CPT | Mod: 26

## 2023-09-28 PROCEDURE — 70551 MRI BRAIN STEM W/O DYE: CPT

## 2023-09-29 ENCOUNTER — NON-APPOINTMENT (OUTPATIENT)
Age: 63
End: 2023-09-29

## 2023-10-10 ENCOUNTER — OUTPATIENT (OUTPATIENT)
Dept: OUTPATIENT SERVICES | Facility: HOSPITAL | Age: 63
LOS: 1 days | End: 2023-10-10
Payer: MEDICARE

## 2023-10-10 VITALS
WEIGHT: 222.01 LBS | HEART RATE: 61 BPM | OXYGEN SATURATION: 98 % | RESPIRATION RATE: 14 BRPM | TEMPERATURE: 98 F | SYSTOLIC BLOOD PRESSURE: 123 MMHG | DIASTOLIC BLOOD PRESSURE: 84 MMHG | HEIGHT: 72 IN

## 2023-10-10 DIAGNOSIS — Z98.890 OTHER SPECIFIED POSTPROCEDURAL STATES: Chronic | ICD-10-CM

## 2023-10-10 DIAGNOSIS — G20.C PARKINSONISM, UNSPECIFIED: ICD-10-CM

## 2023-10-10 DIAGNOSIS — Z29.9 ENCOUNTER FOR PROPHYLACTIC MEASURES, UNSPECIFIED: ICD-10-CM

## 2023-10-10 DIAGNOSIS — Z01.818 ENCOUNTER FOR OTHER PREPROCEDURAL EXAMINATION: ICD-10-CM

## 2023-10-10 DIAGNOSIS — G20.A1 PARKINSON'S DISEASE WITHOUT DYSKINESIA, WITHOUT MENTION OF FLUCTUATIONS: ICD-10-CM

## 2023-10-10 LAB
ANION GAP SERPL CALC-SCNC: 9 MMOL/L — SIGNIFICANT CHANGE UP (ref 5–17)
BLD GP AB SCN SERPL QL: NEGATIVE — SIGNIFICANT CHANGE UP
BUN SERPL-MCNC: 19 MG/DL — SIGNIFICANT CHANGE UP (ref 7–23)
CALCIUM SERPL-MCNC: 8.7 MG/DL — SIGNIFICANT CHANGE UP (ref 8.4–10.5)
CHLORIDE SERPL-SCNC: 103 MMOL/L — SIGNIFICANT CHANGE UP (ref 96–108)
CO2 SERPL-SCNC: 28 MMOL/L — SIGNIFICANT CHANGE UP (ref 22–31)
CREAT SERPL-MCNC: 0.61 MG/DL — SIGNIFICANT CHANGE UP (ref 0.5–1.3)
EGFR: 109 ML/MIN/1.73M2 — SIGNIFICANT CHANGE UP
GLUCOSE SERPL-MCNC: 106 MG/DL — HIGH (ref 70–99)
HCT VFR BLD CALC: 43 % — SIGNIFICANT CHANGE UP (ref 39–50)
HGB BLD-MCNC: 13.5 G/DL — SIGNIFICANT CHANGE UP (ref 13–17)
MCHC RBC-ENTMCNC: 30.1 PG — SIGNIFICANT CHANGE UP (ref 27–34)
MCHC RBC-ENTMCNC: 31.4 GM/DL — LOW (ref 32–36)
MCV RBC AUTO: 95.8 FL — SIGNIFICANT CHANGE UP (ref 80–100)
MRSA PCR RESULT.: SIGNIFICANT CHANGE UP
MRSA PCR RESULT.: SIGNIFICANT CHANGE UP
NRBC # BLD: 0 /100 WBCS — SIGNIFICANT CHANGE UP (ref 0–0)
PLATELET # BLD AUTO: 286 K/UL — SIGNIFICANT CHANGE UP (ref 150–400)
POTASSIUM SERPL-MCNC: 4 MMOL/L — SIGNIFICANT CHANGE UP (ref 3.5–5.3)
POTASSIUM SERPL-SCNC: 4 MMOL/L — SIGNIFICANT CHANGE UP (ref 3.5–5.3)
RBC # BLD: 4.49 M/UL — SIGNIFICANT CHANGE UP (ref 4.2–5.8)
RBC # FLD: 13.3 % — SIGNIFICANT CHANGE UP (ref 10.3–14.5)
RH IG SCN BLD-IMP: POSITIVE — SIGNIFICANT CHANGE UP
S AUREUS DNA NOSE QL NAA+PROBE: SIGNIFICANT CHANGE UP
S AUREUS DNA NOSE QL NAA+PROBE: SIGNIFICANT CHANGE UP
SODIUM SERPL-SCNC: 140 MMOL/L — SIGNIFICANT CHANGE UP (ref 135–145)
WBC # BLD: 8.53 K/UL — SIGNIFICANT CHANGE UP (ref 3.8–10.5)
WBC # FLD AUTO: 8.53 K/UL — SIGNIFICANT CHANGE UP (ref 3.8–10.5)

## 2023-10-10 PROCEDURE — 87640 STAPH A DNA AMP PROBE: CPT

## 2023-10-10 PROCEDURE — G0463: CPT

## 2023-10-10 PROCEDURE — 86850 RBC ANTIBODY SCREEN: CPT

## 2023-10-10 PROCEDURE — 36415 COLL VENOUS BLD VENIPUNCTURE: CPT

## 2023-10-10 PROCEDURE — 86900 BLOOD TYPING SEROLOGIC ABO: CPT

## 2023-10-10 PROCEDURE — 87641 MR-STAPH DNA AMP PROBE: CPT

## 2023-10-10 PROCEDURE — 85027 COMPLETE CBC AUTOMATED: CPT

## 2023-10-10 PROCEDURE — 93005 ELECTROCARDIOGRAM TRACING: CPT

## 2023-10-10 PROCEDURE — 86901 BLOOD TYPING SEROLOGIC RH(D): CPT

## 2023-10-10 PROCEDURE — 93010 ELECTROCARDIOGRAM REPORT: CPT

## 2023-10-10 PROCEDURE — 80048 BASIC METABOLIC PNL TOTAL CA: CPT

## 2023-10-10 RX ORDER — SODIUM CHLORIDE 9 MG/ML
3 INJECTION INTRAMUSCULAR; INTRAVENOUS; SUBCUTANEOUS EVERY 8 HOURS
Refills: 0 | Status: DISCONTINUED | OUTPATIENT
Start: 2023-10-25 | End: 2023-10-27

## 2023-10-10 RX ORDER — CHLORHEXIDINE GLUCONATE 213 G/1000ML
1 SOLUTION TOPICAL ONCE
Refills: 0 | Status: DISCONTINUED | OUTPATIENT
Start: 2023-10-25 | End: 2023-10-27

## 2023-10-10 RX ORDER — LIDOCAINE HCL 20 MG/ML
0.2 VIAL (ML) INJECTION ONCE
Refills: 0 | Status: DISCONTINUED | OUTPATIENT
Start: 2023-10-25 | End: 2023-10-27

## 2023-10-10 NOTE — H&P PST ADULT - OTHER CARE PROVIDERS
Dr. Linda Gil neurologist [phone] Dr. Linda Gil neurologist [phone]; Dr. Troy Lincoln psychiatrist [phone]

## 2023-10-10 NOTE — H&P PST ADULT - HISTORY OF PRESENT ILLNESS
63 year old male with PMH of severe Parkinson's disease, HTN, anxiety. He was diagnosed with Parkinson's disease in 2015 when he was having dragging of left leg, and difficulty moving. He was started on levodopa and it worked well for a few years. He now has B/L stiffness, and slowness, but no tremor. he is also experiencing neck discomfort and tilting of his neck forward. He has severe on/off motor fluctuations. He has sudden severe wearing offs of medications, and effects of medication lasts 1-2 hours then he has severe stiffness and slowness. He moves his neck better when he takes the medication. He experiencing freezing of gait, and he can't speak or think at those times. He walks with walker most of the time, and he can do his own bathing/dressing/toileting/feeding most days, sometimes needs help. 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 Patient denies recent fever, chills, chest pain, SOB, palpitations, or recent exposure to COVID-19.         63 year old male with PMH of severe Parkinson's disease, HTN, anxiety. He was diagnosed with Parkinson's disease in 2015 when he was having dragging of left leg, and difficulty moving. He was started on levodopa and it worked well for a few years. He now has B/L stiffness, and slowness, but no tremor. He is also experiencing neck discomfort and tilting of his neck forward. He has severe on/off motor fluctuations. He has sudden severe wearing offs of medications, and effects of medication lasts 1-2 hours then he has severe stiffness and slowness. He moves his neck better when he takes the medication. He experiencing freezing of gait, and he can't speak or think at those times. He walks with walker most of the time, and he can usually do his own bathing/dressing/toileting/feeding most days, but sometimes needs help. 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 Patient denies recent fever, chills, chest pain, SOB, palpitations, or recent exposure to COVID-19.

## 2023-10-10 NOTE — H&P PST ADULT - MUSCULOSKELETAL
details… decreased ROM due to pain/strength 5/5 bilateral upper extremities/strength 5/5 bilateral lower extremities/abnormal gait

## 2023-10-10 NOTE — H&P PST ADULT - PROBLEM SELECTOR PLAN 1
Stage 1 Deep Brain Surgery Bilateral Implantation Into Globus Pallidus Internus With Leksell Frame on 10/25/23, and Stage 2 on 10/30/23.  Pre op instructions, including chlorhexidine, provided and all questions answered.   CBC, BMP, Type&screen, and MRSA done today in PST.   Patient instructed to take his Rytary and gabapentin on the morning of surgery with a small sip of water. Stage 1 Deep Brain Surgery Bilateral Implantation Into Globus Pallidus Internus With Leksell Frame on 10/25/23, and Stage 2 on 10/30/23.  Pre op instructions, including chlorhexidine, provided and all questions answered.   CBC, BMP, Type&screen, and MRSA done today in [facility].   Patient instructed to take his Rytary and gabapentin on the morning of surgery with a small sip of water.      ECG done today at [facility] with ST and T wave abnormality. Patient instructed to contact PCP for medical clearance prior to surgery.

## 2023-10-10 NOTE — H&P PST ADULT - ASSESSMENT
Activity: Patient walks slowly, using his walker for balance, for up to 5-10 minutes but stopping as needed. He can walk up stairs, but slowly, and it depends on how his symptoms from Parkinson's are that day. He usually dresses himself, but can need assistance at times.     DASI: 4.31    Mallampati: 3    Dental: Denies dentures/loose teeth.     CAPRINI VTE 2.0 SCORE [CLOT updated 2019]    AGE RELATED RISK FACTORS                                                       MOBILITY RELATED FACTORS  [ ] Age 41-60 years                                            (1 Point)                    [ ] Bed rest                                                        (1 Point)  [X ] Age: 61-74 years                                           (2 Points)                  [ ] Plaster cast                                                   (2 Points)  [ ] Age= 75 years                                              (3 Points)                    [ ] Bed bound for more than 72 hours                 (2 Points)    DISEASE RELATED RISK FACTORS                                               GENDER SPECIFIC FACTORS  [ ] Edema in the lower extremities                       (1 Point)              [ ] Pregnancy                                                     (1 Point)  [ ] Varicose veins                                               (1 Point)                     [ ] Post-partum < 6 weeks                                   (1 Point)             [X ] BMI > 25 Kg/m2                                            (1 Point)                     [ ] Hormonal therapy  or oral contraception          (1 Point)                 [ ] Sepsis (in the previous month)                        (1 Point)               [ ] History of pregnancy complications                 (1 point)  [ ] Pneumonia or serious lung disease                                               [ ] Unexplained or recurrent                     (1 Point)           (in the previous month)                               (1 Point)  [ ] Abnormal pulmonary function test                     (1 Point)                 SURGERY RELATED RISK FACTORS  [ ] Acute myocardial infarction                              (1 Point)               [ ]  Section                                             (1 Point)  [ ] Congestive heart failure (in the previous month)  (1 Point)      [ ] Minor surgery                                                  (1 Point)   [ ] Inflammatory bowel disease                             (1 Point)               [ ] Arthroscopic surgery                                        (2 Points)  [ ] Central venous access                                      (2 Points)                [X ] General surgery lasting more than 45 minutes (2 points)  [ ] Malignancy- Present or previous                   (2 Points)                [ ] Elective arthroplasty                                         (5 points)    [ ] Stroke (in the previous month)                          (5 Points)                                                                                                                                                           HEMATOLOGY RELATED FACTORS                                                 TRAUMA RELATED RISK FACTORS  [ ] Prior episodes of VTE                                     (3 Points)                [ ] Fracture of the hip, pelvis, or leg                       (5 Points)  [ ] Positive family history for VTE                         (3 Points)             [ ] Acute spinal cord injury (in the previous month)  (5 Points)  [ ] Prothrombin 13802 A                                     (3 Points)               [ ] Paralysis  (less than 1 month)                             (5 Points)  [ ] Factor V Leiden                                             (3 Points)                  [ ] Multiple Trauma within 1 month                        (5 Points)  [ ] Lupus anticoagulants                                     (3 Points)                                                           [ ] Anticardiolipin antibodies                               (3 Points)                                                       [ ] High homocysteine in the blood                      (3 Points)                                             [ ] Other congenital or acquired thrombophilia      (3 Points)                                                [ ] Heparin induced thrombocytopenia                  (3 Points)                                     Total Score [     5     ]

## 2023-10-12 ENCOUNTER — NON-APPOINTMENT (OUTPATIENT)
Age: 63
End: 2023-10-12

## 2023-10-12 ENCOUNTER — APPOINTMENT (OUTPATIENT)
Dept: INTERNAL MEDICINE | Facility: CLINIC | Age: 63
End: 2023-10-12
Payer: MEDICARE

## 2023-10-12 VITALS
DIASTOLIC BLOOD PRESSURE: 62 MMHG | HEART RATE: 78 BPM | RESPIRATION RATE: 15 BRPM | SYSTOLIC BLOOD PRESSURE: 96 MMHG | OXYGEN SATURATION: 96 % | HEIGHT: 74 IN | WEIGHT: 226 LBS | BODY MASS INDEX: 29 KG/M2

## 2023-10-12 PROCEDURE — 99214 OFFICE O/P EST MOD 30 MIN: CPT

## 2023-10-18 PROBLEM — F41.9 ANXIETY DISORDER, UNSPECIFIED: Chronic | Status: ACTIVE | Noted: 2023-10-10

## 2023-10-19 ENCOUNTER — NON-APPOINTMENT (OUTPATIENT)
Age: 63
End: 2023-10-19

## 2023-10-19 ENCOUNTER — APPOINTMENT (OUTPATIENT)
Dept: CARDIOLOGY | Facility: CLINIC | Age: 63
End: 2023-10-19
Payer: MEDICARE

## 2023-10-19 VITALS
HEART RATE: 78 BPM | SYSTOLIC BLOOD PRESSURE: 110 MMHG | OXYGEN SATURATION: 96 % | BODY MASS INDEX: 28.62 KG/M2 | DIASTOLIC BLOOD PRESSURE: 70 MMHG | HEIGHT: 74 IN | TEMPERATURE: 97.4 F | WEIGHT: 223 LBS

## 2023-10-19 DIAGNOSIS — Z01.818 ENCOUNTER FOR OTHER PREPROCEDURAL EXAMINATION: ICD-10-CM

## 2023-10-19 DIAGNOSIS — R07.9 CHEST PAIN, UNSPECIFIED: ICD-10-CM

## 2023-10-19 DIAGNOSIS — R94.31 ABNORMAL ELECTROCARDIOGRAM [ECG] [EKG]: ICD-10-CM

## 2023-10-19 DIAGNOSIS — Z82.3 FAMILY HISTORY OF STROKE: ICD-10-CM

## 2023-10-19 PROCEDURE — A9500: CPT

## 2023-10-19 PROCEDURE — 93000 ELECTROCARDIOGRAM COMPLETE: CPT | Mod: NC

## 2023-10-19 PROCEDURE — 93015 CV STRESS TEST SUPVJ I&R: CPT

## 2023-10-19 PROCEDURE — 99204 OFFICE O/P NEW MOD 45 MIN: CPT | Mod: 25

## 2023-10-19 PROCEDURE — 78452 HT MUSCLE IMAGE SPECT MULT: CPT

## 2023-10-19 RX ORDER — REGADENOSON 0.08 MG/ML
0.4 INJECTION, SOLUTION INTRAVENOUS
Qty: 1 | Refills: 0 | Status: COMPLETED | OUTPATIENT
Start: 2023-10-19

## 2023-10-19 RX ADMIN — REGADENOSON 0 MG/5ML: 0.08 INJECTION, SOLUTION INTRAVENOUS at 00:00

## 2023-10-20 ENCOUNTER — APPOINTMENT (OUTPATIENT)
Dept: CARDIOLOGY | Facility: CLINIC | Age: 63
End: 2023-10-20
Payer: MEDICARE

## 2023-10-20 PROCEDURE — 93306 TTE W/DOPPLER COMPLETE: CPT

## 2023-10-24 ENCOUNTER — TRANSCRIPTION ENCOUNTER (OUTPATIENT)
Age: 63
End: 2023-10-24

## 2023-10-24 ENCOUNTER — NON-APPOINTMENT (OUTPATIENT)
Age: 63
End: 2023-10-24

## 2023-10-25 ENCOUNTER — TRANSCRIPTION ENCOUNTER (OUTPATIENT)
Age: 63
End: 2023-10-25

## 2023-10-25 ENCOUNTER — INPATIENT (INPATIENT)
Facility: HOSPITAL | Age: 63
LOS: 1 days | Discharge: NURSING, REHAB OR LONG-TERM CARE | DRG: 27 | End: 2023-10-27
Attending: NEUROLOGICAL SURGERY | Admitting: NEUROLOGICAL SURGERY
Payer: MEDICARE

## 2023-10-25 ENCOUNTER — APPOINTMENT (OUTPATIENT)
Dept: NEUROSURGERY | Facility: HOSPITAL | Age: 63
End: 2023-10-25

## 2023-10-25 VITALS
HEART RATE: 74 BPM | OXYGEN SATURATION: 95 % | RESPIRATION RATE: 15 BRPM | TEMPERATURE: 98 F | DIASTOLIC BLOOD PRESSURE: 90 MMHG | SYSTOLIC BLOOD PRESSURE: 154 MMHG | WEIGHT: 222.01 LBS

## 2023-10-25 DIAGNOSIS — G20.C PARKINSONISM, UNSPECIFIED: ICD-10-CM

## 2023-10-25 DIAGNOSIS — Z98.890 OTHER SPECIFIED POSTPROCEDURAL STATES: Chronic | ICD-10-CM

## 2023-10-25 PROCEDURE — 61867 IMPLANT NEUROELECTRODE: CPT | Mod: 50

## 2023-10-25 PROCEDURE — 70260 X-RAY EXAM OF SKULL: CPT | Mod: 26

## 2023-10-25 PROCEDURE — 70460 CT HEAD/BRAIN W/DYE: CPT | Mod: 26

## 2023-10-25 DEVICE — SURGIFOAM PAD 8CM X 12.5CM X 10MM (100): Type: IMPLANTABLE DEVICE | Status: FUNCTIONAL

## 2023-10-25 DEVICE — KIT DVC SENSIGHT DBS BURR HOLE PLATE: Type: IMPLANTABLE DEVICE | Status: FUNCTIONAL

## 2023-10-25 DEVICE — SURGIFLO MATRIX WITH THROMBIN KIT: Type: IMPLANTABLE DEVICE | Status: FUNCTIONAL

## 2023-10-25 DEVICE — LEAD SENSIGHT DBS 1.5MM 42CM: Type: IMPLANTABLE DEVICE | Status: FUNCTIONAL

## 2023-10-25 RX ORDER — ONDANSETRON 8 MG/1
4 TABLET, FILM COATED ORAL EVERY 6 HOURS
Refills: 0 | Status: DISCONTINUED | OUTPATIENT
Start: 2023-10-25 | End: 2023-10-27

## 2023-10-25 RX ORDER — GABAPENTIN 400 MG/1
100 CAPSULE ORAL DAILY
Refills: 0 | Status: DISCONTINUED | OUTPATIENT
Start: 2023-10-25 | End: 2023-10-27

## 2023-10-25 RX ORDER — SODIUM CHLORIDE 9 MG/ML
1000 INJECTION INTRAMUSCULAR; INTRAVENOUS; SUBCUTANEOUS
Refills: 0 | Status: DISCONTINUED | OUTPATIENT
Start: 2023-10-25 | End: 2023-10-27

## 2023-10-25 RX ORDER — CARBIDOPA AND LEVODOPA 25; 100 MG/1; MG/1
3 TABLET ORAL
Refills: 0 | Status: DISCONTINUED | OUTPATIENT
Start: 2023-10-25 | End: 2023-10-27

## 2023-10-25 RX ORDER — ACETAMINOPHEN 500 MG
650 TABLET ORAL EVERY 6 HOURS
Refills: 0 | Status: DISCONTINUED | OUTPATIENT
Start: 2023-10-25 | End: 2023-10-27

## 2023-10-25 RX ORDER — GABAPENTIN 400 MG/1
200 CAPSULE ORAL AT BEDTIME
Refills: 0 | Status: DISCONTINUED | OUTPATIENT
Start: 2023-10-25 | End: 2023-10-27

## 2023-10-25 RX ORDER — ESCITALOPRAM OXALATE 10 MG/1
20 TABLET, FILM COATED ORAL DAILY
Refills: 0 | Status: DISCONTINUED | OUTPATIENT
Start: 2023-10-25 | End: 2023-10-27

## 2023-10-25 RX ORDER — FENTANYL CITRATE 50 UG/ML
25 INJECTION INTRAVENOUS
Refills: 0 | Status: DISCONTINUED | OUTPATIENT
Start: 2023-10-25 | End: 2023-10-26

## 2023-10-25 RX ORDER — CEFAZOLIN SODIUM 1 G
2000 VIAL (EA) INJECTION EVERY 8 HOURS
Refills: 0 | Status: COMPLETED | OUTPATIENT
Start: 2023-10-25 | End: 2023-10-26

## 2023-10-25 RX ORDER — OXYCODONE HYDROCHLORIDE 5 MG/1
5 TABLET ORAL EVERY 4 HOURS
Refills: 0 | Status: DISCONTINUED | OUTPATIENT
Start: 2023-10-25 | End: 2023-10-27

## 2023-10-25 RX ORDER — FAMOTIDINE 10 MG/ML
20 INJECTION INTRAVENOUS EVERY 12 HOURS
Refills: 0 | Status: DISCONTINUED | OUTPATIENT
Start: 2023-10-25 | End: 2023-10-27

## 2023-10-25 RX ORDER — CLONAZEPAM 1 MG
0.5 TABLET ORAL
Refills: 0 | Status: DISCONTINUED | OUTPATIENT
Start: 2023-10-25 | End: 2023-10-27

## 2023-10-25 RX ORDER — CEFAZOLIN SODIUM 1 G
2000 VIAL (EA) INJECTION ONCE
Refills: 0 | Status: COMPLETED | OUTPATIENT
Start: 2023-10-25 | End: 2023-10-25

## 2023-10-25 RX ADMIN — GABAPENTIN 200 MILLIGRAM(S): 400 CAPSULE ORAL at 21:11

## 2023-10-25 RX ADMIN — CARBIDOPA AND LEVODOPA 3 CAPSULE(S): 25; 100 TABLET ORAL at 21:12

## 2023-10-25 RX ADMIN — SODIUM CHLORIDE 75 MILLILITER(S): 9 INJECTION INTRAMUSCULAR; INTRAVENOUS; SUBCUTANEOUS at 21:12

## 2023-10-25 RX ADMIN — SODIUM CHLORIDE 3 MILLILITER(S): 9 INJECTION INTRAMUSCULAR; INTRAVENOUS; SUBCUTANEOUS at 22:13

## 2023-10-25 RX ADMIN — CARBIDOPA AND LEVODOPA 3 CAPSULE(S): 25; 100 TABLET ORAL at 23:57

## 2023-10-25 NOTE — PRE-OP CHECKLIST - PATIENT PROBLEMS/NEEDS
Patient expressed no known problems or needs [facility] Pregnancy And Lactation Text: There is insufficient data to evaluate drug-associated risk for major birth defects, miscarriage, or other adverse maternal or fetal outcomes.  There is a pregnancy registry that monitors pregnancy outcomes in pregnant persons exposed to the medication during pregnancy.  It is unknown if this medication is excreted in breast milk.  Do not breastfeed during treatment and for about 4 weeks after the last dose.

## 2023-10-25 NOTE — PRE-OP CHECKLIST - WARM FLUIDS/WARM BLANKETS
need for outpatient follow-up/return to ED if symptoms worsen, persist or questions arise/radiology results/lab results
no

## 2023-10-25 NOTE — PRE-ANESTHESIA EVALUATION ADULT - NSANTHPMHFT_GEN_ALL_CORE
63M with PMH s/f HTN, Parkinson's disease, anxiety, marijuana use, walker use who presents today for Stage 1 Deep Brain Surgery Bilateral Implantation Into Globus Pallidus Internus With Leksell Frame    He is experiencing neck discomfort and tilting of his neck forward. Denies cervical spine issue or radiculopathy.

## 2023-10-25 NOTE — PATIENT PROFILE ADULT - FUNCTIONAL ASSESSMENT - DAILY ACTIVITY 2.
"Chief Complaint   Patient presents with   • Nausea   • Vomiting         History of Present Illness  Patient is a 71-year-old female who presents today for follow-up.  She was seen in the office August 2021 for abdominal pain and vomiting.  CT scan was performed with no acute findings, incidental findings of fatty liver and a small hiatal hernia.  Gastric emptying study is pending.  EGD and colonoscopy were performed.  Colonoscopy with 1 polyp, diverticulosis, internal hemorrhoids, thickened folds of the ileocecal valve.  EGD with 2 cm hiatal hernia, erythema in the stomach, food residue in the gastric body and duodenal bulb.  Biopsies were benign.    Patient presents today for follow-up.  She reports she continues to experience nausea and dyspepsia on a regular basis but has not had as much vomiting lately.  She has been watching her diet.  She has been taking omeprazole for reflux.  She reports some abdominal discomfort present in her upper abdomen and generally occurs if she overeats.          Result Review :       Tissue Pathology Exam (09/20/2021 09:26)   UPPER GI ENDOSCOPY (09/20/2021 09:18)   CT Abdomen Pelvis With Contrast (09/08/2021 12:16)   Office Visit with Mely Ramos APRN (08/13/2021)   NM Hepatobiliary Without CCK (07/12/2021 14:53)   US Gallbladder (03/19/2021 13:37)   NM Hepatobiliary Without CCK (07/12/2021 14:53)   Office Visit with Marilyn Turner APRN (07/01/2021)       Vital Signs:   /82   Pulse 92   Temp 97.5 °F (36.4 °C) (Infrared)   Resp 18   Ht 160 cm (63\")   Wt 101 kg (222 lb 9.6 oz)   SpO2 95%   BMI 39.43 kg/m²     Body mass index is 39.43 kg/m².     Physical Exam  Vitals reviewed.   Constitutional:       General: She is not in acute distress.     Appearance: She is well-developed.   HENT:      Head: Normocephalic and atraumatic.   Pulmonary:      Effort: Pulmonary effort is normal. No respiratory distress.   Abdominal:      General: Abdomen is flat. Bowel " sounds are normal. There is no distension.      Palpations: Abdomen is soft.      Tenderness: There is no abdominal tenderness.   Skin:     General: Skin is dry.      Coloration: Skin is not pale.   Neurological:      Mental Status: She is alert and oriented to person, place, and time.   Psychiatric:         Thought Content: Thought content normal.           Assessment and Plan    Diagnoses and all orders for this visit:    1. Nausea and vomiting, intractability of vomiting not specified, unspecified vomiting type (Primary)    2. Early satiety    3. Epigastric pain    4. Retained food in stomach    5. Hiatal hernia    6. Fatty liver         Discussion  Patient presents today for follow-up after EGD and colonoscopy.  EGD and colonoscopy findings reviewed at today's office visit.  Discussed presence of retained food on EGD and suspicion for diabetic gastroparesis.  Gastric emptying study is pending for further evaluation.  Recommended trial of gastroparesis diet pending this evaluation.          Follow Up   Return for Follow up to review results after testing complete.    Patient Instructions   Proceed with gastric emptying study as planned for further evaluation.    For suspected gastroparesis, follow a gastroparesis diet. Eat smaller more frequent meals as opposed to large meals and foods lower in fat and fiber.    For fatty liver, weight loss is recommended. Recommend following a low fat and low sugar diet. Recommend management of diabetes and elevated cholesterol with primary care provider if indicated. Regular exercise is recommended. Alcohol avoidance is recommended.              2 = A lot of assistance

## 2023-10-25 NOTE — PATIENT PROFILE ADULT - FALL HARM RISK - HARM RISK INTERVENTIONS

## 2023-10-25 NOTE — PATIENT PROFILE ADULT - FALL HARM RISK - FALL HARM RISK
Other [facility]  [phone].   Nurse to call and visit day after discharge and Physical Therapy to follow

## 2023-10-26 ENCOUNTER — TRANSCRIPTION ENCOUNTER (OUTPATIENT)
Age: 63
End: 2023-10-26

## 2023-10-26 LAB
ANION GAP SERPL CALC-SCNC: 11 MMOL/L — SIGNIFICANT CHANGE UP (ref 5–17)
ANION GAP SERPL CALC-SCNC: 11 MMOL/L — SIGNIFICANT CHANGE UP (ref 5–17)
BUN SERPL-MCNC: 10 MG/DL — SIGNIFICANT CHANGE UP (ref 7–23)
BUN SERPL-MCNC: 10 MG/DL — SIGNIFICANT CHANGE UP (ref 7–23)
CALCIUM SERPL-MCNC: 8.8 MG/DL — SIGNIFICANT CHANGE UP (ref 8.4–10.5)
CALCIUM SERPL-MCNC: 8.8 MG/DL — SIGNIFICANT CHANGE UP (ref 8.4–10.5)
CHLORIDE SERPL-SCNC: 102 MMOL/L — SIGNIFICANT CHANGE UP (ref 96–108)
CHLORIDE SERPL-SCNC: 102 MMOL/L — SIGNIFICANT CHANGE UP (ref 96–108)
CO2 SERPL-SCNC: 27 MMOL/L — SIGNIFICANT CHANGE UP (ref 22–31)
CO2 SERPL-SCNC: 27 MMOL/L — SIGNIFICANT CHANGE UP (ref 22–31)
CREAT SERPL-MCNC: 0.53 MG/DL — SIGNIFICANT CHANGE UP (ref 0.5–1.3)
CREAT SERPL-MCNC: 0.53 MG/DL — SIGNIFICANT CHANGE UP (ref 0.5–1.3)
EGFR: 113 ML/MIN/1.73M2 — SIGNIFICANT CHANGE UP
EGFR: 113 ML/MIN/1.73M2 — SIGNIFICANT CHANGE UP
GLUCOSE SERPL-MCNC: 140 MG/DL — HIGH (ref 70–99)
GLUCOSE SERPL-MCNC: 140 MG/DL — HIGH (ref 70–99)
HCT VFR BLD CALC: 38.3 % — LOW (ref 39–50)
HCT VFR BLD CALC: 38.3 % — LOW (ref 39–50)
HCT VFR BLD CALC: 41.7 % — SIGNIFICANT CHANGE UP (ref 39–50)
HCT VFR BLD CALC: 41.7 % — SIGNIFICANT CHANGE UP (ref 39–50)
HGB BLD-MCNC: 12.2 G/DL — LOW (ref 13–17)
HGB BLD-MCNC: 12.2 G/DL — LOW (ref 13–17)
HGB BLD-MCNC: 12.8 G/DL — LOW (ref 13–17)
HGB BLD-MCNC: 12.8 G/DL — LOW (ref 13–17)
MCHC RBC-ENTMCNC: 29 PG — SIGNIFICANT CHANGE UP (ref 27–34)
MCHC RBC-ENTMCNC: 29 PG — SIGNIFICANT CHANGE UP (ref 27–34)
MCHC RBC-ENTMCNC: 29.9 PG — SIGNIFICANT CHANGE UP (ref 27–34)
MCHC RBC-ENTMCNC: 29.9 PG — SIGNIFICANT CHANGE UP (ref 27–34)
MCHC RBC-ENTMCNC: 30.7 GM/DL — LOW (ref 32–36)
MCHC RBC-ENTMCNC: 30.7 GM/DL — LOW (ref 32–36)
MCHC RBC-ENTMCNC: 31.9 GM/DL — LOW (ref 32–36)
MCHC RBC-ENTMCNC: 31.9 GM/DL — LOW (ref 32–36)
MCV RBC AUTO: 93.9 FL — SIGNIFICANT CHANGE UP (ref 80–100)
MCV RBC AUTO: 93.9 FL — SIGNIFICANT CHANGE UP (ref 80–100)
MCV RBC AUTO: 94.3 FL — SIGNIFICANT CHANGE UP (ref 80–100)
MCV RBC AUTO: 94.3 FL — SIGNIFICANT CHANGE UP (ref 80–100)
NRBC # BLD: 0 /100 WBCS — SIGNIFICANT CHANGE UP (ref 0–0)
PLATELET # BLD AUTO: 268 K/UL — SIGNIFICANT CHANGE UP (ref 150–400)
PLATELET # BLD AUTO: 268 K/UL — SIGNIFICANT CHANGE UP (ref 150–400)
PLATELET # BLD AUTO: 278 K/UL — SIGNIFICANT CHANGE UP (ref 150–400)
PLATELET # BLD AUTO: 278 K/UL — SIGNIFICANT CHANGE UP (ref 150–400)
POTASSIUM SERPL-MCNC: 3.8 MMOL/L — SIGNIFICANT CHANGE UP (ref 3.5–5.3)
POTASSIUM SERPL-MCNC: 3.8 MMOL/L — SIGNIFICANT CHANGE UP (ref 3.5–5.3)
POTASSIUM SERPL-SCNC: 3.8 MMOL/L — SIGNIFICANT CHANGE UP (ref 3.5–5.3)
POTASSIUM SERPL-SCNC: 3.8 MMOL/L — SIGNIFICANT CHANGE UP (ref 3.5–5.3)
RBC # BLD: 4.08 M/UL — LOW (ref 4.2–5.8)
RBC # BLD: 4.08 M/UL — LOW (ref 4.2–5.8)
RBC # BLD: 4.42 M/UL — SIGNIFICANT CHANGE UP (ref 4.2–5.8)
RBC # BLD: 4.42 M/UL — SIGNIFICANT CHANGE UP (ref 4.2–5.8)
RBC # FLD: 13.2 % — SIGNIFICANT CHANGE UP (ref 10.3–14.5)
RBC # FLD: 13.2 % — SIGNIFICANT CHANGE UP (ref 10.3–14.5)
RBC # FLD: 13.3 % — SIGNIFICANT CHANGE UP (ref 10.3–14.5)
RBC # FLD: 13.3 % — SIGNIFICANT CHANGE UP (ref 10.3–14.5)
SODIUM SERPL-SCNC: 140 MMOL/L — SIGNIFICANT CHANGE UP (ref 135–145)
SODIUM SERPL-SCNC: 140 MMOL/L — SIGNIFICANT CHANGE UP (ref 135–145)
WBC # BLD: 10.79 K/UL — HIGH (ref 3.8–10.5)
WBC # BLD: 10.79 K/UL — HIGH (ref 3.8–10.5)
WBC # BLD: 11.74 K/UL — HIGH (ref 3.8–10.5)
WBC # BLD: 11.74 K/UL — HIGH (ref 3.8–10.5)
WBC # FLD AUTO: 10.79 K/UL — HIGH (ref 3.8–10.5)
WBC # FLD AUTO: 10.79 K/UL — HIGH (ref 3.8–10.5)
WBC # FLD AUTO: 11.74 K/UL — HIGH (ref 3.8–10.5)
WBC # FLD AUTO: 11.74 K/UL — HIGH (ref 3.8–10.5)

## 2023-10-26 PROCEDURE — 70450 CT HEAD/BRAIN W/O DYE: CPT | Mod: 26

## 2023-10-26 PROCEDURE — 99222 1ST HOSP IP/OBS MODERATE 55: CPT

## 2023-10-26 RX ORDER — SODIUM CHLORIDE 9 MG/ML
1000 INJECTION, SOLUTION INTRAVENOUS
Refills: 0 | Status: DISCONTINUED | OUTPATIENT
Start: 2023-10-30 | End: 2023-10-31

## 2023-10-26 RX ORDER — CEPHALEXIN 500 MG
1 CAPSULE ORAL
Qty: 0 | Refills: 0 | DISCHARGE
Start: 2023-10-26 | End: 2023-11-02

## 2023-10-26 RX ORDER — CARBIDOPA AND LEVODOPA 25; 100 MG/1; MG/1
3 TABLET ORAL
Qty: 0 | Refills: 0 | DISCHARGE
Start: 2023-10-26

## 2023-10-26 RX ORDER — ACETAMINOPHEN 500 MG
2 TABLET ORAL
Qty: 0 | Refills: 0 | DISCHARGE
Start: 2023-10-26

## 2023-10-26 RX ORDER — SODIUM CHLORIDE 9 MG/ML
3 INJECTION INTRAMUSCULAR; INTRAVENOUS; SUBCUTANEOUS EVERY 8 HOURS
Refills: 0 | Status: DISCONTINUED | OUTPATIENT
Start: 2023-10-30 | End: 2023-11-13

## 2023-10-26 RX ORDER — ESCITALOPRAM OXALATE 10 MG/1
1 TABLET, FILM COATED ORAL
Qty: 0 | Refills: 0 | DISCHARGE
Start: 2023-10-26

## 2023-10-26 RX ORDER — OXYCODONE HYDROCHLORIDE 5 MG/1
1 TABLET ORAL
Qty: 0 | Refills: 0 | DISCHARGE
Start: 2023-10-26

## 2023-10-26 RX ORDER — FAMOTIDINE 10 MG/ML
1 INJECTION INTRAVENOUS
Qty: 0 | Refills: 0 | DISCHARGE
Start: 2023-10-26

## 2023-10-26 RX ORDER — CHLORHEXIDINE GLUCONATE 213 G/1000ML
1 SOLUTION TOPICAL ONCE
Refills: 0 | Status: DISCONTINUED | OUTPATIENT
Start: 2023-10-30 | End: 2023-11-13

## 2023-10-26 RX ADMIN — SODIUM CHLORIDE 3 MILLILITER(S): 9 INJECTION INTRAMUSCULAR; INTRAVENOUS; SUBCUTANEOUS at 22:29

## 2023-10-26 RX ADMIN — Medication 0.5 MILLIGRAM(S): at 20:07

## 2023-10-26 RX ADMIN — CARBIDOPA AND LEVODOPA 3 CAPSULE(S): 25; 100 TABLET ORAL at 08:10

## 2023-10-26 RX ADMIN — SODIUM CHLORIDE 3 MILLILITER(S): 9 INJECTION INTRAMUSCULAR; INTRAVENOUS; SUBCUTANEOUS at 05:23

## 2023-10-26 RX ADMIN — FAMOTIDINE 20 MILLIGRAM(S): 10 INJECTION INTRAVENOUS at 17:37

## 2023-10-26 RX ADMIN — Medication 100 MILLIGRAM(S): at 11:00

## 2023-10-26 RX ADMIN — ESCITALOPRAM OXALATE 20 MILLIGRAM(S): 10 TABLET, FILM COATED ORAL at 11:10

## 2023-10-26 RX ADMIN — SODIUM CHLORIDE 75 MILLILITER(S): 9 INJECTION INTRAMUSCULAR; INTRAVENOUS; SUBCUTANEOUS at 11:00

## 2023-10-26 RX ADMIN — GABAPENTIN 100 MILLIGRAM(S): 400 CAPSULE ORAL at 11:10

## 2023-10-26 RX ADMIN — CARBIDOPA AND LEVODOPA 3 CAPSULE(S): 25; 100 TABLET ORAL at 11:10

## 2023-10-26 RX ADMIN — SODIUM CHLORIDE 3 MILLILITER(S): 9 INJECTION INTRAMUSCULAR; INTRAVENOUS; SUBCUTANEOUS at 13:02

## 2023-10-26 RX ADMIN — CARBIDOPA AND LEVODOPA 3 CAPSULE(S): 25; 100 TABLET ORAL at 17:36

## 2023-10-26 RX ADMIN — Medication 100 MILLIGRAM(S): at 01:02

## 2023-10-26 RX ADMIN — GABAPENTIN 200 MILLIGRAM(S): 400 CAPSULE ORAL at 22:12

## 2023-10-26 NOTE — OCCUPATIONAL THERAPY INITIAL EVALUATION ADULT - LIVES WITH, PROFILE
Condo, 0 steps to enter, +elevator. Occasional assist with ADLs and transfers. Uses rollator for ambulation. +Stall shower. (-) DME/spouse

## 2023-10-26 NOTE — DISCHARGE NOTE PROVIDER - NSDCFUSCHEDAPPT_GEN_ALL_CORE_FT
Addison Garcia  [facility]  NEUROSURG [address]  Scheduled Appointment: 10/30/2023    Addison Garcia  [facility]  [facility] ASU-AmbSurg  Scheduled Appointment: 10/30/2023    Linda Gil  [facility]  NEUROLOGY [address] [place] [address]  Scheduled Appointment: 11/15/2023

## 2023-10-26 NOTE — PHYSICAL THERAPY INITIAL EVALUATION ADULT - ADDITIONAL COMMENTS
Pt lives with his wife in a co-op, no steps required. Pt is able to ambulate mostly independently with a rollator, requires some assistance from his wife for ADLs.

## 2023-10-26 NOTE — DISCHARGE NOTE PROVIDER - NSDCFUADDINST_GEN_ALL_CORE_FT
Please keep incision clean and dry, you can shower on day # 3, please wear a shower cap, do not submerge wound in water for prolonged periods of time, pat dry after showering, and do not use any creams or ointments to incision.  Please do not engage in strenuous activity, heavy lifting, drive, or return to work or school until cleared by surgeon.  Please notify physician if fevers, bleeding, swelling, pain not relieved by medication, increased sluggishness or irritability, increased nausea or vomiting, inability to tolerate foods or liquids.  No heavy lifting/straining, Showering allowed, Stairs allowed, Walking - Indoors allowed, Walking - Outdoors allowed, Follow Instructions Provided by your Surgical Team  Do not start any Motrin /Aspirin NSAIDS( Motrin, Advil.... until cleared by your neurosurgeon

## 2023-10-26 NOTE — OCCUPATIONAL THERAPY INITIAL EVALUATION ADULT - ADL RETRAINING, OT EVAL
Patient will dress upper body with (S) in 4 weeks. Patient will dress lower body with (S), AE as needed in 4 weeks. Pt will self feed (I) within 4 weeks

## 2023-10-26 NOTE — DISCHARGE NOTE PROVIDER - NSDCCPCAREPLAN_GEN_ALL_CORE_FT
PRINCIPAL DISCHARGE DIAGNOSIS  Diagnosis: Parkinsons disease  Assessment and Plan of Treatment: You had Stage 1 DBS treatment and you are stable for transfer to Rehab today as per PT/OT and Physical medecine recommendations. Continue your home parkinson medications while in rehab Follow up Stage 2 is scheduled for Monday      SECONDARY DISCHARGE DIAGNOSES  Diagnosis: Prophylactic measure  Assessment and Plan of Treatment: Continue Famotidine for GERD    Diagnosis: Anxiety  Assessment and Plan of Treatment: Continue home meds    Diagnosis: Depression  Assessment and Plan of Treatment: Continue home meds     PRINCIPAL DISCHARGE DIAGNOSIS  Diagnosis: Parkinsons disease  Assessment and Plan of Treatment: You had Stage 1 DBS treatment and you are stable for transfer to Rehab today as per PT/OT and Physical medecine recommendations. Continue your home parkinson medications while in rehab Follow up Stage 2 is scheduled for Monday. NPO after midnight before Stage 2 of Surgery.   Will have staples from stage 1 surgery removed at stage 2 surgery.  Follow up in office 2 week after stage 2 surgery.   Return to ER immediately for any of the following: fever, bleeding, new onset numbness/tingling/weakness, nausea and/or vomiting, chest pain, shortness of breath, confusion, seizure, altered mental status, urinary and/or fecal incontinence or retention.  Please make an appointment for follow up with your primary care physician after discharge from rehab.        SECONDARY DISCHARGE DIAGNOSES  Diagnosis: Prophylactic measure  Assessment and Plan of Treatment: Continue Famotidine for GERD  Please make an appointment for follow up with your primary care physician after discharge.      Diagnosis: Anxiety  Assessment and Plan of Treatment: Continue home meds    Diagnosis: Depression  Assessment and Plan of Treatment: Continue home meds

## 2023-10-26 NOTE — DISCHARGE NOTE PROVIDER - HOSPITAL COURSE
63 year old male with PMH of severe Parkinson's disease, HTN, anxiety. He was diagnosed with Parkinson's disease in 2015 when he was having dragging of left leg, and difficulty moving. He was started on levodopa and it worked well for a few years. He now has B/L stiffness, and slowness, but no tremor. He is also experiencing neck discomfort and tilting of his neck forward. He has severe on/off motor fluctuations. He has sudden severe wearing offs of medications, and effects of medication lasts 1-2 hours then he has severe stiffness and slowness. He moves his neck better when he takes the medication. He experiencing freezing of gait, and he can't speak or think at those times. He walks with walker most of the time, and he can usually do his own bathing/dressing/toileting/feeding most days, but sometimes needs help. 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 Patient denies recent fever, chills, chest pain, SOB, palpitations, or recent exposure to COVID-19.    Procedure:  s/p unilateral implant into globus pallidus internus 10/25/23    Patient is doing well post-operatively. Follow up head Ct : no heme, good leads placement  Patient was seen by PT/OT/Physical medecine and Acute rehab recommended.   Patient is coming back for stage 2 DBS on 10/30 63 year old male with PMH of severe Parkinson's disease, HTN, anxiety. He was diagnosed with Parkinson's disease in 2015 when he was having dragging of left leg, and difficulty moving. He was started on levodopa and it worked well for a few years. He now has B/L stiffness, and slowness, but no tremor. He is also experiencing neck discomfort and tilting of his neck forward. He has severe on/off motor fluctuations. He has sudden severe wearing offs of medications, and effects of medication lasts 1-2 hours then he has severe stiffness and slowness. He moves his neck better when he takes the medication. He experiencing freezing of gait, and he can't speak or think at those times. He walks with walker most of the time, and he can usually do his own bathing/dressing/toileting/feeding most days, but sometimes needs help. 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 Patient denies recent fever, chills, chest pain, SOB, palpitations, or recent exposure to COVID-19.    Procedure:  s/p unilateral implant into globus pallidus internus 10/25/23    Patient is doing well post-operatively. Follow up head Ct : no heme, good leads placement  Patient was seen by PT/OT/Physical medicine and Acute rehab recommended.   Patient is coming back for stage 2 DBS on 10/30    On the day of discharge he is medically and neurologically stable for discharge to rehab.

## 2023-10-26 NOTE — PHYSICAL THERAPY INITIAL EVALUATION ADULT - PERTINENT HX OF CURRENT PROBLEM, REHAB EVAL
63 year old male with PMH of severe Parkinson's disease, HTN, anxiety. He was diagnosed with Parkinson's disease in 2015 when he was having dragging of left leg, and difficulty moving. He was started on levodopa and it worked well for a few years. He now has B/L stiffness, and slowness, but no tremor. He is also experiencing neck discomfort and tilting of his neck forward. He has severe on/off motor fluctuations. He has sudden severe wearing offs of medications, and effects of medication lasts 1-2 hours then he has severe stiffness and slowness. He moves his neck better when he takes the medication. He experiencing freezing of gait, and he can't speak or think at those times. He walks with walker most of the time, and he can usually do his own bathing/dressing/toileting/feeding most days, but sometimes needs help. 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 Patient denies recent fever, chills, chest pain, SOB, palpitations, or recent exposure to COVID-19.

## 2023-10-26 NOTE — OCCUPATIONAL THERAPY INITIAL EVALUATION ADULT - ORIENTATION, REHAB EVAL
unable to identify hospital, despite choices. Stated current month as november. Unable to provide a year, despite cues. Repeating the month/person

## 2023-10-26 NOTE — OCCUPATIONAL THERAPY INITIAL EVALUATION ADULT - TRANSFER TRAINING, PT EVAL
Patient will transfer to toilet with DME as needed with (S) in 4 weeks. Pt will  perform SPT (S) with AD as needed within 4 weeks.

## 2023-10-26 NOTE — DISCHARGE NOTE PROVIDER - CARE PROVIDER_API CALL
Addison Garcia  Neurosurgery  [address] [facility], [address]  [place] [address]  Phone: [phone]  Fax: [phone]  Follow Up Time:

## 2023-10-26 NOTE — PROGRESS NOTE ADULT - ASSESSMENT
63 year old male with PMH of severe Parkinson's disease, HTN, anxiety. He was diagnosed with Parkinson's disease in 2015 when he was having dragging of left leg, and difficulty moving. He was started on levodopa and it worked well for a few years. He now has B/L stiffness, and slowness, but no tremor. He is also experiencing neck discomfort and tilting of his neck forward. He has severe on/off motor fluctuations. He has sudden severe wearing offs of medications, and effects of medication lasts 1-2 hours then he has severe stiffness and slowness. He moves his neck better when he takes the medication. He experiencing freezing of gait, and he can't speak or think at those times. He walks with walker most of the time, and he can usually do his own bathing/dressing/toileting/feeding most days, but sometimes needs help. 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 Patient denies recent fever, chills, chest pain, SOB, palpitations, or recent exposure to COVID-19.    Procedure:  s/p unilateral implant into globus pallidus internus 10/25/23    PLAN:  NEURO:  H/o Parkinson c/w carbidopa  C/w clonazepam and escitalopram for Anxiety and depression    CARDIOVASCULAR:  Hemodynamically stable    PULMONARY:  Sat 95-99 on RA  IS    RENAL:  IVL  Voiding    GI:  Regular diet  Bowel regimen: Miralax and Senna  H/o GERD: c/w Famotidine    HEME:  H/H pend    ID:  Afebrile   Will d/c on Keflex    ENDO:  No issue    DVT PROPHYLAXIS:  [x] Venodynes                                [] Heparin/Lovenox    FALL RISK:  [x] Low Risk                                    [] Impulsive    Will discuss with Dr Garcia  [id number]   63 year old male with PMH of severe Parkinson's disease, HTN, anxiety. He was diagnosed with Parkinson's disease in 2015 when he was having dragging of left leg, and difficulty moving. He was started on levodopa and it worked well for a few years. He now has B/L stiffness, and slowness, but no tremor. He is also experiencing neck discomfort and tilting of his neck forward. He has severe on/off motor fluctuations. He has sudden severe wearing offs of medications, and effects of medication lasts 1-2 hours then he has severe stiffness and slowness. He moves his neck better when he takes the medication. He experiencing freezing of gait, and he can't speak or think at those times. He walks with walker most of the time, and he can usually do his own bathing/dressing/toileting/feeding most days, but sometimes needs help. 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 Patient denies recent fever, chills, chest pain, SOB, palpitations, or recent exposure to COVID-19.    Procedure:  s/p unilateral implant into globus pallidus internus 10/25/23    PLAN:  NEURO:  H/o Parkinson c/w carbidopa  C/w clonazepam and escitalopram for Anxiety and depression    CARDIOVASCULAR:  Hemodynamically stable    PULMONARY:  Sat 95-99 on RA  IS    RENAL:  IVL  Voiding    GI:  Regular diet  Bowel regimen: Miralax and Senna  H/o GERD: c/w Famotidine    HEME:  H/H stable    ID:  Afebrile   Will d/c on Keflex    ENDO:  No issue    DVT PROPHYLAXIS:  [x] Venodynes                                [] Heparin/Lovenox    FALL RISK:  [x] Low Risk                                    [] Impulsive    Will discuss with Dr Garcia  [id number]

## 2023-10-26 NOTE — DISCHARGE NOTE PROVIDER - NSDCMRMEDTOKEN_GEN_ALL_CORE_FT
acetaminophen 325 mg oral tablet: 2 tab(s) orally every 6 hours As needed Mild Pain (1 - 3)  carbidopa-levodopa 36.25 mg-145 mg oral capsule, extended release: 3 cap(s) orally 4 times a day  clonazePAM 0.5 mg oral tablet: 1 tab(s) orally 2 times a day as needed for  anxiety  escitalopram 20 mg oral tablet: 1 tab(s) orally once a day in the afternoon  escitalopram 20 mg oral tablet: 1 tab(s) orally once a day  famotidine 20 mg oral tablet: 1 tab(s) orally every 12 hours  gabapentin 100 mg oral capsule: 1 cap(s) orally once a day in the AM  gabapentin 100 mg oral capsule: 2 cap(s) orally once a day (at bedtime)  Keflex 500 mg oral capsule: 1 cap(s) orally 3 times a day  Neupro 2 mg/24 hr transdermal film, extended release: 1 patch transdermally once a day  oxyCODONE 5 mg oral tablet: 1 tab(s) orally every 4 hours As needed Moderate Pain (4 - 6)  Rytary 36.25 mg-145 mg oral capsule, extended release: 3 cap(s) orally 4 times a day

## 2023-10-26 NOTE — OCCUPATIONAL THERAPY INITIAL EVALUATION ADULT - PERTINENT HX OF CURRENT PROBLEM, REHAB EVAL
63 year old male with PMH of severe Parkinson's disease, HTN, anxiety. He was diagnosed with Parkinson's disease in 2015 when he was having dragging of left leg, and difficulty moving. He was started on levodopa and it worked well for a few years. He now has B/L stiffness, and slowness, but no tremor. He is also experiencing neck discomfort and tilting of his neck forward. He has severe on/off motor fluctuations. He has sudden severe wearing offs of medications, and effects of medication lasts 1-2 hours then he has severe stiffness and slowness. He moves his neck better when he takes the medication. He experiencing freezing of gait, and he can't speak or think at those times. He walks with walker most of the time, and he can usually do his own bathing/dressing/toileting/feeding most days, but sometimes needs help. 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 Patient denies recent fever, chills, chest pain, SOB, palpitations, or recent exposure to COVID-19.  CT head-Age-appropriate involutional and ischemic gliotic changes. No   hemorrhage. Normal intracranial vascular enhancement. Stealth protocol. No change since 9/28/2023.

## 2023-10-26 NOTE — CONSULT NOTE ADULT - SUBJECTIVE AND OBJECTIVE BOX
HPI:  63 year old male with PMH of severe Parkinson's disease, HTN, anxiety. He was diagnosed with Parkinson's disease in 2015 when he was having dragging of left leg, and difficulty moving. He was started on levodopa and it worked well for a few years. He now has B/L stiffness, and slowness, but no tremor. He is also experiencing neck discomfort and tilting of his neck forward. He has severe on/off motor fluctuations. He has sudden severe wearing offs of medications, and effects of medication lasts 1-2 hours then he has severe stiffness and slowness. He moves his neck better when he takes the medication. He experiencing freezing of gait, and he can't speak or think at those times. He walks with walker most of the time, and he can usually do his own bathing/dressing/toileting/feeding most days, but sometimes needs help. 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 Patient denies recent fever, chills, chest pain, SOB, palpitations, or recent exposure to COVID-19.    Patient was admitted on 10/25, s/p DBS, seen today, wife at bedside. Report episodes of freezing at home, used walker as needed.     REVIEW OF SYSTEMS  Constitutional - No fever, No weight loss, No fatigue  HEENT -  No vertigo, No neck pain  Respiratory - No cough, No wheezing, No shortness of breath  Cardiovascular - No chest pain, No palpitations  Gastrointestinal - No abdominal pain, No nausea, No vomiting, No diarrhea, No constipation  Genitourinary - No dysuria, No frequency, No hematuria, No incontinence  Psychiatric - No depression, No anxiety    VITALS  T(C): 36.6 (10-26-23 @ 09:00), Max: 37.1 (10-25-23 @ 22:45)  HR: 71 (10-26-23 @ 09:00) (65 - 75)  BP: 125/78 (10-26-23 @ 09:00) (121/69 - 157/96)  RR: 17 (10-26-23 @ 09:00) (14 - 18)  SpO2: 99% (10-26-23 @ 09:00) (95% - 99%)  Wt(kg): --    PAST MEDICAL & SURGICAL HISTORY  Parkinson disease    Anxiety and depression    Status post Mohs surgery    SOCIAL HISTORY  Smoking - Denied  EtOH - Denied   Drugs - Denied    FUNCTIONAL HISTORY  Lives with wife  used walker, needed some assist with ADLs     CURRENT FUNCTIONAL STATUS  evaluations pending     FAMILY HISTORY   no pertinent history in first degree relatives     RECENT LABS/IMAGING  CBC Full  -  ( 26 Oct 2023 09:43 )  WBC Count : 11.74 K/uL  RBC Count : 4.42 M/uL  Hemoglobin : 12.8 g/dL  Hematocrit : 41.7 %  Platelet Count - Automated : 278 K/uL  Mean Cell Volume : 94.3 fl  Mean Cell Hemoglobin : 29.0 pg  Mean Cell Hemoglobin Concentration : 30.7 gm/dL  Auto Neutrophil # : x  Auto Lymphocyte # : x  Auto Monocyte # : x  Auto Eosinophil # : x  Auto Basophil # : x  Auto Neutrophil % : x  Auto Lymphocyte % : x  Auto Monocyte % : x  Auto Eosinophil % : x  Auto Basophil % : x    10-26    140  |  102  |  10  ----------------------------<  140<H>  3.8   |  27  |  0.53    Ca    8.8      26 Oct 2023 09:43      Urinalysis Basic - ( 26 Oct 2023 09:43 )    Color: x / Appearance: x / SG: x / pH: x  Gluc: 140 mg/dL / Ketone: x  / Bili: x / Urobili: x   Blood: x / Protein: x / Nitrite: x   Leuk Esterase: x / RBC: x / WBC x   Sq Epi: x / Non Sq Epi: x / Bacteria: x    < from: CT Head w/ IV Cont (10.25.23 @ 14:45) >    IMPRESSION: Age-appropriate involutional and ischemic gliotic changes. No   hemorrhage. Normal intracranial vascular enhancement. Stealth protocol.   No change since 9/28/2023.    < end of copied text >      ALLERGIES  No Known Allergies      MEDICATIONS   acetaminophen     Tablet .. 650 milliGRAM(s) Oral every 6 hours PRN  bisacodyl 5 milliGRAM(s) Oral daily PRN  carbidopa 36.25 mG/levodopa 145 mG ER 3 Capsule(s) Oral four times a day  ceFAZolin   IVPB 2000 milliGRAM(s) IV Intermittent every 8 hours  chlorhexidine 2% Cloths 1 Application(s) Topical once  clonazePAM  Tablet 0.5 milliGRAM(s) Oral two times a day PRN  escitalopram 20 milliGRAM(s) Oral daily  famotidine    Tablet 20 milliGRAM(s) Oral every 12 hours  gabapentin 100 milliGRAM(s) Oral daily  gabapentin 200 milliGRAM(s) Oral at bedtime  lidocaine 1% Injectable 0.2 milliLiter(s) Local Injection once  ondansetron Injectable 4 milliGRAM(s) IV Push every 6 hours PRN  oxyCODONE    IR 5 milliGRAM(s) Oral every 4 hours PRN  sodium chloride 0.9% lock flush 3 milliLiter(s) IV Push every 8 hours  sodium chloride 0.9%. 1000 milliLiter(s) IV Continuous <Continuous>      ----------------------------------------------------------------------------------------  PHYSICAL EXAM  Constitutional - NAD, Comfortable, in bed  HEENT -dressings in place, +drool    Neck - flexed   Chest - Breathing comfortably, room air   Cardiovascular - S1S2   Abdomen - Soft   Extremities - No C/C/E, No calf tenderness   Neurologic Exam -     slow processing, hypophonic               Cognitive - Awake, Alert, AAO to self, place: ? restaurant, year: 2025     Communication - Fluent, No dysarthria        Motor - moves all ext      Sensory - Intact to LT     Psychiatric - Mood stable, Affect WNL  ----------------------------------------------------------------------------------------  ASSESSMENT/PLAN  63yMale h/o HTN, anxiety, Parkinsons with functional deficits after DBS   Pain - Tylenol oxycodone, lidocaine, gabapentin  DVT PPX - SCDs,   Rehab - Will continue to follow for ongoing rehab needs and recommendations.   needs PT/OT evaluations, speech evaluation for swallowing    Recommend ACUTE inpatient rehabilitation for the functional deficits consisting of 3 hours of therapy/day & 24 hour RN/daily PMR physician for comorbid medical management. Patient will be able to tolerate 3 hours a day.

## 2023-10-26 NOTE — DISCHARGE NOTE PROVIDER - NSDCACTIVITY_GEN_ALL_CORE
Do not drive or operate machinery/Do not make important decisions/Stairs allowed/Walking - Indoors allowed/Walking - Outdoors allowed/Follow Instructions Provided by your Surgical Team Do not drive or operate machinery/Showering allowed/Do not make important decisions/Stairs allowed/Walking - Indoors allowed/No heavy lifting/straining/Walking - Outdoors allowed/Follow Instructions Provided by your Surgical Team

## 2023-10-27 ENCOUNTER — INPATIENT (INPATIENT)
Facility: HOSPITAL | Age: 63
LOS: 2 days | Discharge: ANOTHER HOSPITAL | DRG: 57 | End: 2023-10-30
Attending: PSYCHIATRY & NEUROLOGY | Admitting: PSYCHIATRY & NEUROLOGY
Payer: MEDICARE

## 2023-10-27 ENCOUNTER — TRANSCRIPTION ENCOUNTER (OUTPATIENT)
Age: 63
End: 2023-10-27

## 2023-10-27 VITALS
TEMPERATURE: 98 F | HEART RATE: 78 BPM | DIASTOLIC BLOOD PRESSURE: 87 MMHG | SYSTOLIC BLOOD PRESSURE: 144 MMHG | OXYGEN SATURATION: 95 % | RESPIRATION RATE: 18 BRPM

## 2023-10-27 VITALS
DIASTOLIC BLOOD PRESSURE: 86 MMHG | WEIGHT: 223.11 LBS | TEMPERATURE: 98 F | HEIGHT: 74 IN | SYSTOLIC BLOOD PRESSURE: 155 MMHG | OXYGEN SATURATION: 96 % | HEART RATE: 72 BPM | RESPIRATION RATE: 16 BRPM

## 2023-10-27 DIAGNOSIS — G20.A1 PARKINSON'S DISEASE WITHOUT DYSKINESIA, WITHOUT MENTION OF FLUCTUATIONS: ICD-10-CM

## 2023-10-27 DIAGNOSIS — Z98.890 OTHER SPECIFIED POSTPROCEDURAL STATES: Chronic | ICD-10-CM

## 2023-10-27 LAB
SARS-COV-2 RNA SPEC QL NAA+PROBE: SIGNIFICANT CHANGE UP
SARS-COV-2 RNA SPEC QL NAA+PROBE: SIGNIFICANT CHANGE UP

## 2023-10-27 PROCEDURE — 99223 1ST HOSP IP/OBS HIGH 75: CPT

## 2023-10-27 PROCEDURE — 99232 SBSQ HOSP IP/OBS MODERATE 35: CPT

## 2023-10-27 RX ORDER — ROTIGOTINE 8 MG/24H
1 PATCH, EXTENDED RELEASE TRANSDERMAL EVERY 24 HOURS
Refills: 0 | Status: DISCONTINUED | OUTPATIENT
Start: 2023-10-27 | End: 2023-10-30

## 2023-10-27 RX ORDER — LIDOCAINE 4 G/100G
2 CREAM TOPICAL DAILY
Refills: 0 | Status: DISCONTINUED | OUTPATIENT
Start: 2023-10-27 | End: 2023-10-30

## 2023-10-27 RX ORDER — CLONAZEPAM 1 MG
0.5 TABLET ORAL ONCE
Refills: 0 | Status: DISCONTINUED | OUTPATIENT
Start: 2023-10-27 | End: 2023-10-27

## 2023-10-27 RX ORDER — ROTIGOTINE 8 MG/24H
1 PATCH, EXTENDED RELEASE TRANSDERMAL EVERY 24 HOURS
Refills: 0 | Status: DISCONTINUED | OUTPATIENT
Start: 2023-10-27 | End: 2023-10-27

## 2023-10-27 RX ORDER — GABAPENTIN 400 MG/1
100 CAPSULE ORAL DAILY
Refills: 0 | Status: DISCONTINUED | OUTPATIENT
Start: 2023-10-28 | End: 2023-10-30

## 2023-10-27 RX ORDER — CEPHALEXIN 500 MG
500 CAPSULE ORAL EVERY 12 HOURS
Refills: 0 | Status: DISCONTINUED | OUTPATIENT
Start: 2023-10-27 | End: 2023-10-30

## 2023-10-27 RX ORDER — GABAPENTIN 400 MG/1
200 CAPSULE ORAL AT BEDTIME
Refills: 0 | Status: DISCONTINUED | OUTPATIENT
Start: 2023-10-27 | End: 2023-10-30

## 2023-10-27 RX ORDER — ESCITALOPRAM OXALATE 10 MG/1
20 TABLET, FILM COATED ORAL DAILY
Refills: 0 | Status: DISCONTINUED | OUTPATIENT
Start: 2023-10-28 | End: 2023-10-30

## 2023-10-27 RX ORDER — POLYETHYLENE GLYCOL 3350 17 G/17G
17 POWDER, FOR SOLUTION ORAL
Refills: 0 | Status: DISCONTINUED | OUTPATIENT
Start: 2023-10-27 | End: 2023-10-27

## 2023-10-27 RX ORDER — CLONAZEPAM 1 MG
0.5 TABLET ORAL
Refills: 0 | Status: DISCONTINUED | OUTPATIENT
Start: 2023-10-27 | End: 2023-10-30

## 2023-10-27 RX ORDER — FAMOTIDINE 10 MG/ML
20 INJECTION INTRAVENOUS EVERY 12 HOURS
Refills: 0 | Status: DISCONTINUED | OUTPATIENT
Start: 2023-10-27 | End: 2023-10-30

## 2023-10-27 RX ORDER — CARBIDOPA AND LEVODOPA 25; 100 MG/1; MG/1
3 TABLET ORAL ONCE
Refills: 0 | Status: COMPLETED | OUTPATIENT
Start: 2023-10-27 | End: 2023-10-27

## 2023-10-27 RX ORDER — LIDOCAINE 4 G/100G
2 CREAM TOPICAL DAILY
Refills: 0 | Status: DISCONTINUED | OUTPATIENT
Start: 2023-10-27 | End: 2023-10-27

## 2023-10-27 RX ORDER — ACETAMINOPHEN 500 MG
650 TABLET ORAL EVERY 6 HOURS
Refills: 0 | Status: DISCONTINUED | OUTPATIENT
Start: 2023-10-27 | End: 2023-10-30

## 2023-10-27 RX ORDER — SENNA PLUS 8.6 MG/1
2 TABLET ORAL AT BEDTIME
Refills: 0 | Status: DISCONTINUED | OUTPATIENT
Start: 2023-10-27 | End: 2023-10-27

## 2023-10-27 RX ORDER — SENNA PLUS 8.6 MG/1
2 TABLET ORAL AT BEDTIME
Refills: 0 | Status: DISCONTINUED | OUTPATIENT
Start: 2023-10-27 | End: 2023-10-30

## 2023-10-27 RX ORDER — POLYETHYLENE GLYCOL 3350 17 G/17G
17 POWDER, FOR SOLUTION ORAL
Refills: 0 | Status: DISCONTINUED | OUTPATIENT
Start: 2023-10-27 | End: 2023-10-30

## 2023-10-27 RX ORDER — CARBIDOPA AND LEVODOPA 25; 100 MG/1; MG/1
3 TABLET ORAL
Refills: 0 | Status: DISCONTINUED | OUTPATIENT
Start: 2023-10-27 | End: 2023-10-30

## 2023-10-27 RX ORDER — CLONAZEPAM 1 MG
0.5 TABLET ORAL
Refills: 0 | Status: DISCONTINUED | OUTPATIENT
Start: 2023-10-27 | End: 2023-10-27

## 2023-10-27 RX ADMIN — SODIUM CHLORIDE 3 MILLILITER(S): 9 INJECTION INTRAMUSCULAR; INTRAVENOUS; SUBCUTANEOUS at 07:19

## 2023-10-27 RX ADMIN — GABAPENTIN 100 MILLIGRAM(S): 400 CAPSULE ORAL at 11:14

## 2023-10-27 RX ADMIN — SODIUM CHLORIDE 3 MILLILITER(S): 9 INJECTION INTRAMUSCULAR; INTRAVENOUS; SUBCUTANEOUS at 14:22

## 2023-10-27 RX ADMIN — ESCITALOPRAM OXALATE 20 MILLIGRAM(S): 10 TABLET, FILM COATED ORAL at 11:15

## 2023-10-27 RX ADMIN — CARBIDOPA AND LEVODOPA 3 CAPSULE(S): 25; 100 TABLET ORAL at 21:01

## 2023-10-27 RX ADMIN — FAMOTIDINE 20 MILLIGRAM(S): 10 INJECTION INTRAVENOUS at 18:30

## 2023-10-27 RX ADMIN — CARBIDOPA AND LEVODOPA 3 CAPSULE(S): 25; 100 TABLET ORAL at 18:30

## 2023-10-27 RX ADMIN — SENNA PLUS 2 TABLET(S): 8.6 TABLET ORAL at 11:56

## 2023-10-27 RX ADMIN — FAMOTIDINE 20 MILLIGRAM(S): 10 INJECTION INTRAVENOUS at 06:24

## 2023-10-27 RX ADMIN — SENNA PLUS 2 TABLET(S): 8.6 TABLET ORAL at 21:02

## 2023-10-27 RX ADMIN — CARBIDOPA AND LEVODOPA 3 CAPSULE(S): 25; 100 TABLET ORAL at 06:24

## 2023-10-27 RX ADMIN — Medication 500 MILLIGRAM(S): at 18:30

## 2023-10-27 RX ADMIN — Medication 0.5 MILLIGRAM(S): at 03:48

## 2023-10-27 RX ADMIN — CARBIDOPA AND LEVODOPA 3 CAPSULE(S): 25; 100 TABLET ORAL at 11:15

## 2023-10-27 RX ADMIN — POLYETHYLENE GLYCOL 3350 17 GRAM(S): 17 POWDER, FOR SOLUTION ORAL at 18:30

## 2023-10-27 RX ADMIN — Medication 0.5 MILLIGRAM(S): at 21:57

## 2023-10-27 RX ADMIN — SODIUM CHLORIDE 75 MILLILITER(S): 9 INJECTION INTRAMUSCULAR; INTRAVENOUS; SUBCUTANEOUS at 06:24

## 2023-10-27 RX ADMIN — CARBIDOPA AND LEVODOPA 3 CAPSULE(S): 25; 100 TABLET ORAL at 00:02

## 2023-10-27 RX ADMIN — GABAPENTIN 200 MILLIGRAM(S): 400 CAPSULE ORAL at 21:01

## 2023-10-27 NOTE — H&P ADULT - NSHPREVIEWOFSYSTEMS_GEN_ALL_CORE
REVIEW OF SYSTEMS  Constitutional: No fever, No Chills, No fatigue  HEENT: No eye pain, No visual disturbances, No difficulty hearing  Pulm: No cough,  No shortness of breath  Cardio: No chest pain, No palpitations  GI:  No abdominal pain, No nausea, No vomiting, No diarrhea, No constipation  : No dysuria, No frequency, No hematuria  Neuro: No headaches, + memory loss, No loss of strength, No numbness, + tremors  Skin: No itching, No rashes, No lesions   Endo: No temperature intolerance  MSK: No joint pain, No joint swelling, No muscle pain, No Neck or back pain  Psych:  No depression, No anxiety

## 2023-10-27 NOTE — H&P ADULT - NSHPLABSRESULTS_GEN_ALL_CORE
LABS:                          12.2   10.79 )-----------( 268      ( 26 Oct 2023 10:51 )             38.3     10-26    140  |  102  |  10  ----------------------------<  140<H>  3.8   |  27  |  0.53    Ca    8.8      26 Oct 2023 09:43      < from: CT Head w/ IV Cont (10.25.23 @ 14:45) >    IMPRESSION: Age-appropriate involutional and ischemic gliotic changes. No   hemorrhage. Normal intracranial vascular enhancement. Stealth protocol.   No change since 9/28/2023.    < end of copied text >

## 2023-10-27 NOTE — PATIENT PROFILE ADULT - FUNCTIONAL ASSESSMENT - BASIC MOBILITY 6.
2-calculated by average/Not able to assess (calculate score using [facility] averaging method)

## 2023-10-27 NOTE — DISCHARGE NOTE NURSING/CASE MANAGEMENT/SOCIAL WORK - PATIENT PORTAL LINK FT
You can access the FollowMyHealth Patient Portal offered by [facility] by registering at the following website: [email or web address]. By joining [employer]’s FollowMyHealth portal, you will also be able to view your health information using other applications (apps) compatible with our system.

## 2023-10-27 NOTE — PROGRESS NOTE ADULT - SUBJECTIVE AND OBJECTIVE BOX
no new complaints  eating breakfast, wife at bedside     REVIEW OF SYSTEMS  Constitutional - No fever,  No fatigue  HEENT - No vertigo, No neck pain  Neurological - No headaches, No memory loss, No loss of strength, No numbness, No tremors  Skin - No rashes, No lesions   Musculoskeletal - No joint pain, No joint swelling, No muscle pain  Psychiatric - No depression, No anxiety    FUNCTIONAL PROGRESS  10/26 PT  bed mobility CG  transfers min assist with RW  gait min assist bed to chair with RW    10/26 OT  bed mobility CG  transfers min assist with Rw      VITALS  T(C): 36.7 (10-27-23 @ 09:09), Max: 37.1 (10-26-23 @ 16:38)  HR: 63 (10-27-23 @ 09:09) (63 - 83)  BP: 129/74 (10-27-23 @ 09:09) (128/81 - 150/91)  RR: 18 (10-27-23 @ 09:09) (18 - 18)  SpO2: 98% (10-27-23 @ 09:09) (95% - 98%)  Wt(kg): --    MEDICATIONS   acetaminophen     Tablet .. 650 milliGRAM(s) every 6 hours PRN  bisacodyl 5 milliGRAM(s) daily PRN  carbidopa 36.25 mG/levodopa 145 mG ER 3 Capsule(s) four times a day  chlorhexidine 2% Cloths 1 Application(s) once  clonazePAM  Tablet 0.5 milliGRAM(s) two times a day PRN  escitalopram 20 milliGRAM(s) daily  famotidine    Tablet 20 milliGRAM(s) every 12 hours  gabapentin 100 milliGRAM(s) daily  gabapentin 200 milliGRAM(s) at bedtime  lidocaine 1% Injectable 0.2 milliLiter(s) once  ondansetron Injectable 4 milliGRAM(s) every 6 hours PRN  oxyCODONE    IR 5 milliGRAM(s) every 4 hours PRN  polyethylene glycol 3350 17 Gram(s) two times a day  senna 2 Tablet(s) at bedtime  sodium chloride 0.9% lock flush 3 milliLiter(s) every 8 hours      RECENT LABS - Reviewed                        12.2   10.79 )-----------( 268      ( 26 Oct 2023 10:51 )             38.3     10-26    140  |  102  |  10  ----------------------------<  140<H>  3.8   |  27  |  0.53    Ca    8.8      26 Oct 2023 09:43        Urinalysis Basic - ( 26 Oct 2023 09:43 )    Color: x / Appearance: x / SG: x / pH: x  Gluc: 140 mg/dL / Ketone: x  / Bili: x / Urobili: x   Blood: x / Protein: x / Nitrite: x   Leuk Esterase: x / RBC: x / WBC x   Sq Epi: x / Non Sq Epi: x / Bacteria: x    -----------------------------------------------------------------------------------  PHYSICAL EXAM  Constitutional - NAD, Comfortable, in bed  HEENT -dressings in place, +drool    Neck - flexed   Chest - Breathing comfortably, room air   Cardiovascular - S1S2   Abdomen - Soft   Extremities - No C/C/E, No calf tenderness   Neurologic Exam -     slow processing, hypophonic               Cognitive - Awake, Alert, AAO to self, place: ? restaurant, year: 2025     Communication - Fluent, No dysarthria        Motor - moves all ext      Sensory - Intact to LT     Psychiatric - Mood stable, Affect WNL  ----------------------------------------------------------------------------------------  ASSESSMENT/PLAN  63yMale h/o HTN, anxiety, Parkinsons with functional deficits after DBS   agitation overnight, received clonazepam  plan for stage 2 Oct 30   Pain - Tylenol oxycodone, lidocaine, gabapentin  DVT PPX - SCDs,   Rehab - Will continue to follow for ongoing rehab needs and recommendations.    Recommend ACUTE inpatient rehabilitation for the functional deficits consisting of 3 hours of therapy/day & 24 hour RN/daily PMR physician for comorbid medical management. Patient will be able to tolerate 3 hours a day.       
   63 year old male with PMH of severe Parkinson's disease, HTN, anxiety. He was diagnosed with Parkinson's disease in 2015 when he was having dragging of left leg, and difficulty moving. He was started on levodopa and it worked well for a few years. He now has B/L stiffness, and slowness, but no tremor. He is also experiencing neck discomfort and tilting of his neck forward. He has severe on/off motor fluctuations. He has sudden severe wearing offs of medications, and effects of medication lasts 1-2 hours then he has severe stiffness and slowness. He moves his neck better when he takes the medication. He experiencing freezing of gait, and he can't speak or think at those times. He walks with walker most of the time, and he can usually do his own bathing/dressing/toileting/feeding most days, but sometimes needs help. 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 Patient denies recent fever, chills, chest pain, SOB, palpitations, or recent exposure to COVID-19.    Post-op day #1 s/p unilateral implant into globus pallidus internus    Overnight event: no acute event    Vital Signs Last 24 Hrs  T(C): 36.7 (26 Oct 2023 05:46), Max: 37.1 (25 Oct 2023 22:45)  T(F): 98 (26 Oct 2023 05:46), Max: 98.8 (25 Oct 2023 22:45)  HR: 72 (26 Oct 2023 05:46) (65 - 75)  BP: 132/82 (26 Oct 2023 05:46) (121/69 - 157/96)  BP(mean): 116 (26 Oct 2023 00:00) (90 - 117)  RR: 18 (26 Oct 2023 05:46) (14 - 18)  SpO2: 96% (26 Oct 2023 05:46) (95% - 99%)    Parameters below as of 26 Oct 2023 05:46  Patient On (Oxygen Delivery Method): room air                Stroke Core Measures      DRAIN OUTPUT:     NEUROIMAGING:     PHYSICAL EXAM:    General: No Acute Distress     Neurological: Awake, alert oriented to person, place and time, Following Commands, PERRL, EOMI, Face Symmetrical, Speech Fluent, Moving all extremities, Muscle Strength normal in all four extremities, No Drift, Sensation to Light Touch Intact    Pulmonary: Clear to Auscultation, No Rales, No Rhonchi, No Wheezes     Cardiovascular: S1, S2, Regular Rate and Rhythm     Gastrointestinal: Soft, Nontender, Nondistended     Incision:     MEDICATIONS:   Antibiotics:  ceFAZolin   IVPB 2000 milliGRAM(s) IV Intermittent every 8 hours    Neuro:  acetaminophen     Tablet .. 650 milliGRAM(s) Oral every 6 hours PRN Mild Pain (1 - 3)  carbidopa 36.25 mG/levodopa 145 mG ER 3 Capsule(s) Oral four times a day  clonazePAM  Tablet 0.5 milliGRAM(s) Oral two times a day PRN for anxiety  escitalopram 20 milliGRAM(s) Oral daily  gabapentin 100 milliGRAM(s) Oral daily  gabapentin 200 milliGRAM(s) Oral at bedtime  ondansetron Injectable 4 milliGRAM(s) IV Push every 6 hours PRN Nausea and/or Vomiting  oxyCODONE    IR 5 milliGRAM(s) Oral every 4 hours PRN Moderate Pain (4 - 6)    Anticoagulation:    Cardiology:    Endo:     Pulm:    GI/:  bisacodyl 5 milliGRAM(s) Oral daily PRN  famotidine    Tablet 20 milliGRAM(s) Oral every 12 hours    Other:  chlorhexidine 2% Cloths 1 Application(s) Topical once  lidocaine 1% Injectable 0.2 milliLiter(s) Local Injection once  sodium chloride 0.9% lock flush 3 milliLiter(s) IV Push every 8 hours  sodium chloride 0.9%. 1000 milliLiter(s) IV Continuous <Continuous>  bisacodyl 5 milliGRAM(s) Oral daily PRN  famotidine    Tablet 20 milliGRAM(s) Oral every 12 hours     
SUBJECTIVE: Patient seen and examined at bedside with wife. Pt reports poor appetite, states no headaches, chest pain or shortness of breath. Last BM PTA.    Overnight events: clonazepam given for anxiety overnight    OBJECTIVE:    Vital Signs Last 24 Hrs  T(C): 36.7 (10-27-23 @ 04:00), Max: 37.1 (10-26-23 @ 16:38)  T(F): 98.1 (10-27-23 @ 04:00), Max: 98.7 (10-26-23 @ 16:38)  HR: 70 (10-27-23 @ 04:00) (70 - 83)  BP: 150/91 (10-27-23 @ 04:00) (121/77 - 150/91)  BP(mean): --  RR: 18 (10-27-23 @ 04:00) (18 - 18)  SpO2: 98% (10-27-23 @ 04:00) (95% - 98%)      10-26-23 @ 07:01  -  10-27-23 @ 07:00  --------------------------------------------------------  IN: 900 mL / OUT: 0 mL / NET: 900 mL      LABS:  Na: 140 (10-26 @ 09:43)  K: 3.8 (10-26 @ 09:43)  Cl: 102 (10-26 @ 09:43)  CO2: 27 (10-26 @ 09:43)  BUN: 10 (10-26 @ 09:43)  Cr: 0.53 (10-26 @ 09:43)  Glu: 140(10-26 @ 09:43)    Hgb: 12.2 (10-26 @ 10:51), 12.8 (10-26 @ 09:43)  Hct: 38.3 (10-26 @ 10:51), 41.7 (10-26 @ 09:43)  WBC: 10.79 (10-26 @ 10:51), 11.74 (10-26 @ 09:43)  Plt: 268 (10-26 @ 10:51), 278 (10-26 @ 09:43)      IMAGING:   Recent imaging studies were reviewed.    < from: CT Head No Cont (10.26.23 @ 01:44) >  IMPRESSION:  Deep brain stimulator with tip in the left thalamic capsular   region. Clinical correlation is necessary    --- End of Report ---    < end of copied text >  < from: Xray Skull Complete (10.25.23 @ 21:52) >  IMPRESSION:  Unilateral left DBS electrode placed via left superior frontoparietal   approach. Extracalvarial portion of the wire lead courses to right with   the terminal electrode connector situated over right posterior parietal   region. Surgical skin staples overlie separate scalp incision sites.    Also correlate with intraoperative findings.    < end of copied text >      MEDICATIONS:  acetaminophen     Tablet .. 650 milliGRAM(s) Oral every 6 hours PRN  bisacodyl 5 milliGRAM(s) Oral daily PRN  carbidopa 36.25 mG/levodopa 145 mG ER 3 Capsule(s) Oral four times a day  chlorhexidine 2% Cloths 1 Application(s) Topical once  clonazePAM  Tablet 0.5 milliGRAM(s) Oral two times a day PRN  escitalopram 20 milliGRAM(s) Oral daily  famotidine    Tablet 20 milliGRAM(s) Oral every 12 hours  gabapentin 100 milliGRAM(s) Oral daily  gabapentin 200 milliGRAM(s) Oral at bedtime  lidocaine 1% Injectable 0.2 milliLiter(s) Local Injection once  ondansetron Injectable 4 milliGRAM(s) IV Push every 6 hours PRN  oxyCODONE    IR 5 milliGRAM(s) Oral every 4 hours PRN  sodium chloride 0.9% lock flush 3 milliLiter(s) IV Push every 8 hours  sodium chloride 0.9%. 1000 milliLiter(s) IV Continuous <Continuous>    EXAMINATION:  General: No Acute Distress     Neurological: Awake, alert oriented to person, place and to month but sometimes not to year, Following Commands, PERRL, EOMI, Face Symmetrical, Speech Fluent, Moving all extremities, Muscle Strength normal in all four extremities, No Drift, Sensation to Light Touch Intact    Pulmonary: Clear to Auscultation, No Rales, No Rhonchi, No Wheezes     Cardiovascular: S1, S2, Regular Rate and Rhythm     Gastrointestinal: Soft, Nontender, Nondistended     Incision: cranial incision closed with staples, dry and intact, dressing replaced

## 2023-10-27 NOTE — H&P ADULT - ATTENDING COMMENTS
I have personally seen and examined the patient with the resident. Medical records reviewed. I have made amendments to the documentation where necessary and adjusted the history, physical examination, and plan as documented by the resident.     Moderately advanced Parkinson's Disease, poorly controlled motor fluctuations, s/p stage 1 DBS placement  Multiple severe functional limitations, cognitive impairment   Admit to PD specific rehabilitation unit  Resume all medications  Admit labs  Medicine and Movement Disorder Neurology input  Stage 2 - battery placement is scheduled for Monday at [facility],  on October 30th, transportation arraignments are in place - discussed with NII  Fall ans aspiration precautions  Neuropsychology evaluation

## 2023-10-27 NOTE — H&P ADULT - NSHPPHYSICALEXAM_GEN_ALL_CORE
Gen - NAD, Comfortable  HEENT - NCAT, EOMI, MMM  Neck - Supple, No limited ROM  Pulm - CTAB, No wheeze, No rhonchi, No crackles  Cardiovascular - RRR, S1S2, No murmurs  Abdomen - Soft, NT/ND, +BS  Extremities - No C/C/E, No calf tenderness  Neuro-     Cognitive - tired appearing. Oriented to self, location, and month.     Communication - Nonfluent, stuttering throughout exam. + dysarthria     Attention: Intact      Judgement: Good evidence of judgement     Memory: Recall 3 objects immediate and 3 min later         Cranial Nerves - CN 2-12 intact     Motor -                     LEFT    UE - ShAB 5/5, EF 5/5, EE 5/5, WE 5/5,  5/5                    RIGHT UE - ShAB 5/5, EF 5/5, EE 5/5, WE 5/5,  5/5                    LEFT    LE - HF 5/5, KE 5/5, DF 5/5, PF 5/5                    RIGHT LE - HF 5/5, KE 5/5, DF 5/5, PF 5/5        Sensory - Intact to LT     Reflexes - DTR Intact, No primitive reflexive     Coordination - FTN intact     Tone - normal  Psychiatric - Mood stable, Affect WNL  Skin:  all skin intact    Wounds: None Present Gen - NAD, Comfortable  HEENT - NCAT, EOMI, MMM  Neck - Supple, No limited ROM  Pulm - CTAB, No wheeze, No rhonchi, No crackles  Cardiovascular - RRR, S1S2, No murmurs  Abdomen - Soft, NT/ND, present but hypoactive bowel sounds  Extremities - No C/C/E, No calf tenderness  Neuro-     Cognitive - tired appearing. Oriented to self, location, and month.     Communication - Nonfluent, stuttering throughout exam. + dysarthria     Attention: reduced     Judgement: Good evidence of judgement     Memory: Recall 3 objects immediate but failed 3 min later. Able to recall after 3 minutes with prompting          Cranial Nerves - CN 2-12 intact - tremors throughout examination     Motor - tremors throughout motor examination                    LEFT    UE - ShAB 5/5, EF 5/5, EE 5/5, WE 5/5,  5/5                    RIGHT UE - ShAB 5/5, EF 5/5, EE 5/5, WE 5/5,  5/5                    LEFT    LE - HF 5/5, KE 5/5, DF 5/5, PF 5/5                    RIGHT LE - HF 5/5, KE 5/5, DF 5/5, PF 5/5        Sensory - Intact to LT     Reflexes - DTR Intact, No primitive reflexive     Coordination - FTN slowed     Tone - some rigidity present throughout exam  Psychiatric - Mood stable, Affect wnl  Skin:  all skin intact    Wounds: patient with three incision sites on top of skull s/p deep brain stimulator implant. All sites are clean/ dry/intact at time of exam. Over largest incision site is gauze with some residual serosangenous staining of the gauze. However, no active oozing when seen by examiner. All sites have staples and no dehiscence, fluctuance, erythema, or drainage noted. Gen - NAD, Comfortable  HEENT - NCAT, EOMI, MMM  Neck - Supple, No limited ROM  Pulm - CTAB, No wheeze, No rhonchi, No crackles  Cardiovascular - RRR, S1S2, No murmurs  Abdomen - Soft, NT/ND, present but hypoactive bowel sounds  Extremities - No C/C/E, No calf tenderness  Neuro-     Cognitive - tired appearing. Oriented to self, "hospital" and month, not year     Communication - hesitant, stuttering throughout exam. + dysarthria, hypophonia, names, repeats , follows commands      Attention: impaired      Judgement: impaired      Memory: Recall 3 objects immediate but failed 0/3  1  min later.        Cranial Nerves - VFF, +LR, EOMI, symmetric face, dysarthria, soft palate elevates bilaterally      Motor -  bilateral hand tremor, rigidity, bradykinesia                     LEFT    UE - ShAB 5/5, EF 5/5, EE 5/5, WE 5/5,  5/5                    RIGHT UE - ShAB 5/5, EF 5/5, EE 5/5, WE 5/5,  5/5                    LEFT    LE - HF 5/5, KE 5/5, DF 5/5, PF 5/5                    RIGHT LE - HF 5/5, KE 5/5, DF 5/5, PF 5/5        Sensory - Intact to LT bilaterally      Reflexes - DTR Intact 1+=     Coordination - FTN  - slow but accurate     Psychiatric - Mood stable, Affect wnl  Skin:  all skin intact    Wounds: patient with three incision sites on top of skull s/p deep brain stimulator implant. All sites are clean/ dry/intact at time of exam. Over largest incision site is gauze with some residual serosangenous staining of the gauze. However, no active oozing when seen by examiner. All sites have staples and no dehiscence, fluctuance, erythema, or drainage noted.

## 2023-10-27 NOTE — H&P ADULT - ASSESSMENT
ASSESSMENT/PLAN  63 year old male with PMH of severe Parkinson's disease, HTN, anxiety. He was diagnosed with Parkinson's disease in 2015 when he was having dragging of left leg, and difficulty moving. He was started on levodopa and it worked well for a few years. He now has B/L stiffness, and slowness, but no tremor. He is also experiencing neck discomfort and tilting of his neck forward. He has severe on/off motor fluctuations. He has sudden severe wearing offs of medications, and effects of medication lasts 1-2 hours then he has severe stiffness and slowness. He moves his neck better when he takes the medication. He experiencing freezing of gait, and he can't speak or think at those times. He walks with walker most of the time, and he can usually do his own bathing/dressing/toileting/feeding most days, but sometimes needs help. He is s/p Stage 1 Deep Brain Surgery Bilateral Implantation Into Globus Pallidus Internus With Leksell Frame on 10/25/23 and plan for Stage 2 on 10/30/23. He tolerated procedure well. He was evaluated for admission to acute inpatient rehab and admitted to [facility] on 10/27/23.      #Parkinson's Disease now with gait Instability, ADL impairments and Functional impairments  - Start Comprehensive Rehab Program of PT/OT/SLP    ------------------------------------------------------  Parkinson's related motor symptoms    #Rigidity/Bradykinesia/wearing off  -S/p Stage 1 Deep Brain Surgery Bilateral Implantation Into Globus Pallidus Internus With Leksell Frame on 10/25/23 -staples to be removed by neurosurgery at stage 2 -can shower on day 3  -Plan for Stage 2 on 10/30/23 -NPO 2359 on 10/29  -Continue Keflex 500mg BID for post-op ppx   -Continue Rytary 145mg 3 tabs at 8a, 12pm, 4pm and 8pm   -Continue Neupro patch 4mg /24hours daily  -Movement disorders following    --------------------------------------------------------  Parkinson's related non-motor symptoms    #Mood/anxiety  -Continue lexapro 20mg daily  -Continue Clonazepam 0.5mg BID PRN  -Neuropsych to follow    #Orthostatic hypotension  -Monitor OH vital signs    #Pain control  - Tylenol PRN  -Continue gabapentin 100mg in am and 200mg at bedtime     #GI/Bowel Management/Constipation  - Continue Senna at bedtime   - Miralax BID  -Bisacodyl at bedtime  -Pepcid 20mg BID    #Bladder management  - Continue to monitor PVR q 8 hours (SC if > 400)  -Monitor UO    ----------------------------------------------------------  Concurrent medical problems      #DVT Prophylaxis  - TEDs     #Skin:  -***    FEN   - Diet - Regular + Thins  [CCHO, DASH/TLC]    - Dysphagia  SLP - evaluation and treatment    Precautions / PROPHYLAXIS:   - Falls  - ortho: Weight bearing status: WBAT   - Lungs: Aspiration, Incentive Spirometer   - Pressure injury/Skin: Turn Q2hrs while in bed, OOB to Chair, PT/OT      Addison Garcia  [address] [place] [id number]  Phone: [phone]  Fax: [phone]  Follow Up Time:    MEDICAL PROGNOSIS: GOOD              REHAB POTENTIAL: GOOD              ESTIMATED DISPOSITION: HOME WITH HOME CARE              ELOS: 10-14 Days   EXPECTED THERAPY:     P.T. 1hr/day       O.T. 1hr/day      S.L.P. 1hr/day       P&O Unnecessary       EXP FREQUENCY: 5 days per 7 day period     PRESCREEN COMPARISON:   I have reviewed the prescreen information and I have found no relevant changes between the preadmission screening and my post admission evaluation     RATIONALE FOR INPATIENT ADMISSION - Patient demonstrates the following: (check all that apply)  [X] Medically appropriate for rehabilitation admission  [X] Has attainable rehab goals with an appropriate initial discharge plan  [X] Has rehabilitation potential (expected to make a significant improvement within a reasonable period of time)   [X] Requires close medical management by a rehab physician, rehab nursing care, Hospitalist and comprehensive interdisciplinary team (including PT, OT, & or SLP, Prosthetics and Orthotics)   ASSESSMENT/PLAN  63 year old male with PMH of severe Parkinson's disease, HTN, anxiety. He was diagnosed with Parkinson's disease in 2015 when he was having dragging of left leg, and difficulty moving. He was started on levodopa and it worked well for a few years. He now has B/L stiffness, and slowness, but no tremor. He is also experiencing neck discomfort and tilting of his neck forward. He has severe on/off motor fluctuations. He has sudden severe wearing offs of medications, and effects of medication lasts 1-2 hours then he has severe stiffness and slowness. He moves his neck better when he takes the medication. He experiencing freezing of gait, and he can't speak or think at those times. He walks with walker most of the time, and he can usually do his own bathing/dressing/toileting/feeding most days, but sometimes needs help. He is s/p Stage 1 Deep Brain Surgery Bilateral Implantation Into Globus Pallidus Internus With Leksell Frame on 10/25/23 and plan for Stage 2 on 10/30/23. He tolerated procedure well. He was evaluated for admission to acute inpatient rehab and admitted to [facility] on 10/27/23.      #Parkinson's Disease now with gait Instability, ADL impairments and Functional impairments  - Start Comprehensive Rehab Program of PT/OT/SLP    ------------------------------------------------------  Parkinson's related motor symptoms    #Rigidity/Bradykinesia/wearing off  -S/p Stage 1 Deep Brain Surgery Bilateral Implantation Into Globus Pallidus Internus With Leksell Frame on 10/25/23 -staples to be removed by neurosurgery at stage 2 -can shower on day 3  -Plan for Stage 2 on 10/30/23 -NPO 2359 on 10/29  -Continue Keflex 500mg BID for post-op ppx   -Continue Rytary 145mg 3 tabs at 8a, 12pm, 4pm and 8pm   -Continue Neupro patch 4mg /24hours daily  -Movement disorders following    --------------------------------------------------------  Parkinson's related non-motor symptoms    #Mood/anxiety  -Continue lexapro 20mg daily  -Continue Clonazepam 0.5mg BID PRN  -Neuropsych to follow    #Orthostatic hypotension  -Monitor OH vital signs    #Pain control  - Tylenol PRN  -Continue gabapentin 100mg in am and 200mg at bedtime     #GI/Bowel Management/Constipation  - Continue Senna at bedtime   - Miralax BID  -Bisacodyl at bedtime  -Pepcid 20mg BID    #Bladder management  - Continue to monitor PVR q 8 hours (SC if > 400)  -Monitor UO    ----------------------------------------------------------  Concurrent medical problems      #DVT Prophylaxis  - TEDs   - pharmacologic ppx contraindicated given recent surgery and upcoming surgery    #Skin:  -three surgical sites on top of skull. Clean dry and intact. Staples present. No fluctuance, erythema, drainage, or even oozing at time of exam.    FEN   - Diet - Regular + Thins  [CCHO, DASH/TLC]    - Dysphagia  SLP - evaluation and treatment    Precautions / PROPHYLAXIS:   - Falls  - ortho: Weight bearing status: WBAT   - Lungs: Aspiration, Incentive Spirometer   - Pressure injury/Skin: Turn Q2hrs while in bed, OOB to Chair, PT/OT      Addison Garcia  [address]  [place] [address]  Phone: [phone]  Fax: [phone]  Follow Up Time:    MEDICAL PROGNOSIS: GOOD              REHAB POTENTIAL: GOOD              ESTIMATED DISPOSITION: HOME WITH HOME CARE              ELOS: 10-14 Days   EXPECTED THERAPY:     P.T. 1hr/day       O.T. 1hr/day      S.L.P. 1hr/day       P&O Unnecessary       EXP FREQUENCY: 5 days per 7 day period     PRESCREEN COMPARISON:   I have reviewed the prescreen information and I have found no relevant changes between the preadmission screening and my post admission evaluation     RATIONALE FOR INPATIENT ADMISSION - Patient demonstrates the following: (check all that apply)  [X] Medically appropriate for rehabilitation admission  [X] Has attainable rehab goals with an appropriate initial discharge plan  [X] Has rehabilitation potential (expected to make a significant improvement within a reasonable period of time)   [X] Requires close medical management by a rehab physician, rehab nursing care, Hospitalist and comprehensive interdisciplinary team (including PT, OT, & or SLP, Prosthetics and Orthotics)   ASSESSMENT/PLAN  63 year old male with PMH of severe Parkinson's disease, HTN, anxiety. He was diagnosed with Parkinson's disease in 2015 when he was having dragging of left leg, and difficulty moving. He was started on levodopa and it worked well for a few years. He now has B/L stiffness, and slowness, but no tremor. He is also experiencing neck discomfort and tilting of his neck forward. He has severe on/off motor fluctuations. He has sudden severe wearing offs of medications, and effects of medication lasts 1-2 hours then he has severe stiffness and slowness. He moves his neck better when he takes the medication. He experiencing freezing of gait, and he can't speak or think at those times. He walks with walker most of the time, and he can usually do his own bathing/dressing/toileting/feeding most days, but sometimes needs help. He is s/p Stage 1 Deep Brain Surgery Bilateral Implantation Into Globus Pallidus Internus With Leksell Frame on 10/25/23 and plan for Stage 2 on 10/30/23. He tolerated procedure well. He was evaluated for admission to acute inpatient rehab and admitted to [facility] on 10/27/23.      #Parkinson's Disease now with gait Instability, ADL impairments and Functional impairments  - Start Comprehensive Rehab Program of PT/OT/SLP    ------------------------------------------------------  Parkinson's related motor symptoms    #Rigidity/Bradykinesia/wearing off  -S/p Stage 1 Deep Brain Surgery Bilateral Implantation Into Globus Pallidus Internus With Leksell Frame on 10/25/23 -staples to be removed by neurosurgery at stage 2 -can shower on day 3  -Plan for Stage 2 on 10/30/23 -NPO 2359 on 10/29  -Continue Keflex 500mg BID for post-op ppx   -Continue Rytary 145mg 3 tabs at 8a, 12pm, 4pm and 8pm   -Continue Neupro patch 4mg /24hours daily  -Movement disorders following    --------------------------------------------------------  Parkinson's related non-motor symptoms    #Mood/anxiety  -Continue lexapro 20mg daily  -Continue Clonazepam 0.5mg BID PRN  -Neuropsych to follow    #Orthostatic hypotension  -Monitor OH vital signs    #Pain control  - Tylenol PRN  - Lidocaine patches to b/l knees  -Continue gabapentin 100mg in am and 200mg at bedtime     #GI/Bowel Management/Constipation  - Continue Senna at bedtime   - Miralax BID  -Bisacodyl at bedtime  -Pepcid 20mg BID    #Bladder management  - Continue to monitor PVR q 8 hours (SC if > 400)  -Monitor UO    ----------------------------------------------------------  Concurrent medical problems      #DVT Prophylaxis  - TEDs   - pharmacologic ppx contraindicated given recent surgery and upcoming surgery    #Skin:  -three surgical sites on top of skull. Clean dry and intact. Staples present. No fluctuance, erythema, drainage, or even oozing at time of exam.    FEN   - Diet - Regular + Thins  [CCHO, DASH/TLC]    - Dysphagia  SLP - evaluation and treatment    Precautions / PROPHYLAXIS:   - Falls  - ortho: Weight bearing status: WBAT   - Lungs: Aspiration, Incentive Spirometer   - Pressure injury/Skin: Turn Q2hrs while in bed, OOB to Chair, PT/OT      Addison Garcia  [address]  [place] [address]  Phone: [phone]  Fax: [phone]  Follow Up Time:    MEDICAL PROGNOSIS: GOOD              REHAB POTENTIAL: GOOD              ESTIMATED DISPOSITION: HOME WITH HOME CARE              ELOS: 10-14 Days   EXPECTED THERAPY:     P.T. 1hr/day       O.T. 1hr/day      S.L.P. 1hr/day       P&O Unnecessary       EXP FREQUENCY: 5 days per 7 day period     PRESCREEN COMPARISON:   I have reviewed the prescreen information and I have found no relevant changes between the preadmission screening and my post admission evaluation     RATIONALE FOR INPATIENT ADMISSION - Patient demonstrates the following: (check all that apply)  [X] Medically appropriate for rehabilitation admission  [X] Has attainable rehab goals with an appropriate initial discharge plan  [X] Has rehabilitation potential (expected to make a significant improvement within a reasonable period of time)   [X] Requires close medical management by a rehab physician, rehab nursing care, Hospitalist and comprehensive interdisciplinary team (including PT, OT, & or SLP, Prosthetics and Orthotics)

## 2023-10-27 NOTE — PROGRESS NOTE ADULT - ASSESSMENT
Summary:   63 year old male with PMH of severe Parkinson's disease, HTN, anxiety. He was diagnosed with Parkinson's disease in 2015 when he was having dragging of left leg, and difficulty moving. He was started on levodopa and it worked well for a few years. He now has B/L stiffness, and slowness, but no tremor. He is also experiencing neck discomfort and tilting of his neck forward. He has severe on/off motor fluctuations. He has sudden severe wearing offs of medications, and effects of medication lasts 1-2 hours then he has severe stiffness and slowness. He moves his neck better when he takes the medication. He experiencing freezing of gait, and he can't speak or think at those times. He walks with walker most of the time, and he can usually do his own bathing/dressing/toileting/feeding most days, but sometimes needs help. Now s/p Stage 1 Deep Brain Surgery Bilateral Implantation Into Globus Pallidus Internus With Leksell Frame on 10/25/23, and Stage 2 on 10/30/23.     Assessment:     NEURO:    q4h neuro checks  POD#2 Stage 1 DBS, post op CTH/Skull Xray complete, good placement of electrodes, no heme  Stage 2 scheduled for 10/30  h/o parkinsons dz - continue with carbiodopa/levidopa  h/o anxiety, continue clonazepam prn, continue escitalopram, gabapentin  Pain control - tylenol and oxycodone prn   PT/OT /PMR recommends Acute Rehab, pending auth    CARDS:  -150    PULM:  sat > 92%  on room air  incentive spirometry as needed    RENAL:  IVL    GASTRO:  regular diet  ---> Stress ulcer prophylaxis:  famotidine BID  Last BM prior to admission, continue with senna, miralax, dulcolax    HEME:  stable h/h  ---> DVT prophylaxis: SCDs, SQL tonight    ENDO:  euglycemia    ID:  afebrile    Will discuss w/ Dr. Garcia  #[id number]

## 2023-10-27 NOTE — H&P ADULT - NSHPSOCIALHISTORY_GEN_ALL_CORE
SOCIAL HISTORY  Smoking - Denied  EtOH - Denied   Drugs - Denied    FUNCTIONAL HISTORY  Lives with wife  used walker, needed some assist with ADLs     FUNCTIONAL PROGRESS  10/26 PT  bed mobility CG  transfers min assist with RW  gait min assist bed to chair with RW    10/26 OT  bed mobility CG  transfers min assist with Rw

## 2023-10-27 NOTE — H&P ADULT - HISTORY OF PRESENT ILLNESS
63 year old male with PMH of severe Parkinson's disease, HTN, anxiety. He was diagnosed with Parkinson's disease in 2015 when he was having dragging of left leg, and difficulty moving. He was started on levodopa and it worked well for a few years. He now has B/L stiffness, and slowness, but no tremor. He is also experiencing neck discomfort and tilting of his neck forward. He has severe on/off motor fluctuations. He has sudden severe wearing offs of medications, and effects of medication lasts 1-2 hours then he has severe stiffness and slowness. He moves his neck better when he takes the medication. He experiencing freezing of gait, and he can't speak or think at those times. He walks with walker most of the time, and he can usually do his own bathing/dressing/toileting/feeding most days, but sometimes needs help. He is s/p Stage 1 Deep Brain Surgery Bilateral Implantation Into Globus Pallidus Internus With Leksell Frame on 10/25/23 and plan for Stage 2 on 10/30/23. He tolerated procedure well. He was evaluated for admission to acute inpatient rehab and admitted to [facility] on 10/27/23.    63 year old male with PMH of severe Parkinson's disease, HTN, anxiety. He was diagnosed with Parkinson's disease in 2015 when he was having dragging of left leg, and difficulty moving. He was started on levodopa and it worked well for a few years. He now has B/L stiffness, and slowness, but no tremor. He is also experiencing neck discomfort and tilting of his neck forward. He has severe on/off motor fluctuations. He has sudden severe wearing offs of medications, and effects of medication lasts 1-2 hours then he has severe stiffness and slowness. He moves his neck better when he takes the medication. He experiencing freezing of gait, and he can't speak or think at those times. He walks with walker most of the time, and he can usually do his own bathing/dressing/toileting/feeding most days, but sometimes needs help. He is s/p Stage 1 Deep Brain Surgery Bilateral Implantation Into Globus Pallidus Internus With Leksell Frame on 10/25/23 and plan for Stage 2 on 10/30/23. He tolerated procedure well. He was evaluated for admission to acute inpatient rehab and admitted to [facility] on 10/27/23.     Patient overall reports feeling "good". Wife at bedside to supplement history. He denies headache, blurry vision, dizziness or lightheadedness. Last BM was on 10/24. No N/V/fever. Continent to bladder. Confirmed patient to be receiving Stage 2 on 10/30 and reminded patient who believed it was tomorrow. Patient tired appearing but following commands and conversational. Oriented to self, location, month, and present. Did respond 1984 when asked the year.

## 2023-10-27 NOTE — PATIENT PROFILE ADULT - VISION (WITH CORRECTIVE LENSES IF THE PATIENT USUALLY WEARS THEM):
Normal vision: sees adequately in most situations; can see medication labels, newsprint [FreeTextEntry1] : Pt declined medication treatment for her elevated BP at this time.  She prefers to try lifestyle modification first.\par I recommend weight loss, low salt diet and increased exercise.\par \par I also recommend home BP monitoring and contacting me if BP should remain persistently high in the next 2 weeks.

## 2023-10-27 NOTE — PATIENT PROFILE ADULT - FALL HARM RISK - HARM RISK INTERVENTIONS
Assistance with ambulation/Assistance OOB with selected safe patient handling equipment/Communicate Risk of Fall with Harm to all staff/Discuss with provider need for PT consult/Monitor gait and stability/Provide patient with walking aids - walker, cane, crutches/Reinforce activity limits and safety measures with patient and family/Sit up slowly, dangle for a short time, stand at bedside before walking/Tailored Fall Risk Interventions/Use of alarms - bed, chair and/or voice tab/Visual Cue: Yellow wristband and red socks/Bed in lowest position, wheels locked, appropriate side rails in place/Call bell, personal items and telephone in reach/Instruct patient to call for assistance before getting out of bed or chair/Non-slip footwear when patient is out of bed/[place] to call system/Physically safe environment - no spills, clutter or unnecessary equipment/Purposeful Proactive Rounding/Room/bathroom lighting operational, light cord in reach

## 2023-10-27 NOTE — PATIENT PROFILE ADULT - STATED REASON FOR ADMISSION
admitted for stage 2 Deep Brain Surgery to be done on 10/30/23 -s\p stage 1 Bilateral Implantation Into Globus Pallidus Internus With Leksell Frame done  on 10/25/23

## 2023-10-28 DIAGNOSIS — G20.A1 PARKINSON'S DISEASE WITHOUT DYSKINESIA, WITHOUT MENTION OF FLUCTUATIONS: ICD-10-CM

## 2023-10-28 DIAGNOSIS — Z96.89 PRESENCE OF OTHER SPECIFIED FUNCTIONAL IMPLANTS: ICD-10-CM

## 2023-10-28 DIAGNOSIS — F41.9 ANXIETY DISORDER, UNSPECIFIED: ICD-10-CM

## 2023-10-28 LAB
ALBUMIN SERPL ELPH-MCNC: 2.8 G/DL — LOW (ref 3.3–5)
ALBUMIN SERPL ELPH-MCNC: 2.8 G/DL — LOW (ref 3.3–5)
ALP SERPL-CCNC: 63 U/L — SIGNIFICANT CHANGE UP (ref 40–120)
ALP SERPL-CCNC: 63 U/L — SIGNIFICANT CHANGE UP (ref 40–120)
ALT FLD-CCNC: 6 U/L — LOW (ref 10–45)
ALT FLD-CCNC: 6 U/L — LOW (ref 10–45)
ANION GAP SERPL CALC-SCNC: 6 MMOL/L — SIGNIFICANT CHANGE UP (ref 5–17)
ANION GAP SERPL CALC-SCNC: 6 MMOL/L — SIGNIFICANT CHANGE UP (ref 5–17)
AST SERPL-CCNC: 9 U/L — LOW (ref 10–40)
AST SERPL-CCNC: 9 U/L — LOW (ref 10–40)
BILIRUB SERPL-MCNC: 0.7 MG/DL — SIGNIFICANT CHANGE UP (ref 0.2–1.2)
BILIRUB SERPL-MCNC: 0.7 MG/DL — SIGNIFICANT CHANGE UP (ref 0.2–1.2)
BUN SERPL-MCNC: 14 MG/DL — SIGNIFICANT CHANGE UP (ref 7–23)
BUN SERPL-MCNC: 14 MG/DL — SIGNIFICANT CHANGE UP (ref 7–23)
CALCIUM SERPL-MCNC: 8.5 MG/DL — SIGNIFICANT CHANGE UP (ref 8.4–10.5)
CALCIUM SERPL-MCNC: 8.5 MG/DL — SIGNIFICANT CHANGE UP (ref 8.4–10.5)
CHLORIDE SERPL-SCNC: 104 MMOL/L — SIGNIFICANT CHANGE UP (ref 96–108)
CHLORIDE SERPL-SCNC: 104 MMOL/L — SIGNIFICANT CHANGE UP (ref 96–108)
CO2 SERPL-SCNC: 28 MMOL/L — SIGNIFICANT CHANGE UP (ref 22–31)
CO2 SERPL-SCNC: 28 MMOL/L — SIGNIFICANT CHANGE UP (ref 22–31)
CREAT SERPL-MCNC: 0.67 MG/DL — SIGNIFICANT CHANGE UP (ref 0.5–1.3)
CREAT SERPL-MCNC: 0.67 MG/DL — SIGNIFICANT CHANGE UP (ref 0.5–1.3)
EGFR: 105 ML/MIN/1.73M2 — SIGNIFICANT CHANGE UP
EGFR: 105 ML/MIN/1.73M2 — SIGNIFICANT CHANGE UP
GLUCOSE SERPL-MCNC: 99 MG/DL — SIGNIFICANT CHANGE UP (ref 70–99)
GLUCOSE SERPL-MCNC: 99 MG/DL — SIGNIFICANT CHANGE UP (ref 70–99)
HCT VFR BLD CALC: 39.3 % — SIGNIFICANT CHANGE UP (ref 39–50)
HCT VFR BLD CALC: 39.3 % — SIGNIFICANT CHANGE UP (ref 39–50)
HCV AB S/CO SERPL IA: 0.05 S/CO — SIGNIFICANT CHANGE UP (ref 0–0.99)
HCV AB S/CO SERPL IA: 0.05 S/CO — SIGNIFICANT CHANGE UP (ref 0–0.99)
HCV AB SERPL-IMP: SIGNIFICANT CHANGE UP
HCV AB SERPL-IMP: SIGNIFICANT CHANGE UP
HGB BLD-MCNC: 12.1 G/DL — LOW (ref 13–17)
HGB BLD-MCNC: 12.1 G/DL — LOW (ref 13–17)
MCHC RBC-ENTMCNC: 29.2 PG — SIGNIFICANT CHANGE UP (ref 27–34)
MCHC RBC-ENTMCNC: 29.2 PG — SIGNIFICANT CHANGE UP (ref 27–34)
MCHC RBC-ENTMCNC: 30.8 GM/DL — LOW (ref 32–36)
MCHC RBC-ENTMCNC: 30.8 GM/DL — LOW (ref 32–36)
MCV RBC AUTO: 94.7 FL — SIGNIFICANT CHANGE UP (ref 80–100)
MCV RBC AUTO: 94.7 FL — SIGNIFICANT CHANGE UP (ref 80–100)
NRBC # BLD: 0 /100 WBCS — SIGNIFICANT CHANGE UP (ref 0–0)
NRBC # BLD: 0 /100 WBCS — SIGNIFICANT CHANGE UP (ref 0–0)
PLATELET # BLD AUTO: 260 K/UL — SIGNIFICANT CHANGE UP (ref 150–400)
PLATELET # BLD AUTO: 260 K/UL — SIGNIFICANT CHANGE UP (ref 150–400)
POTASSIUM SERPL-MCNC: 3.6 MMOL/L — SIGNIFICANT CHANGE UP (ref 3.5–5.3)
POTASSIUM SERPL-MCNC: 3.6 MMOL/L — SIGNIFICANT CHANGE UP (ref 3.5–5.3)
POTASSIUM SERPL-SCNC: 3.6 MMOL/L — SIGNIFICANT CHANGE UP (ref 3.5–5.3)
POTASSIUM SERPL-SCNC: 3.6 MMOL/L — SIGNIFICANT CHANGE UP (ref 3.5–5.3)
PROT SERPL-MCNC: 6.1 G/DL — SIGNIFICANT CHANGE UP (ref 6–8.3)
PROT SERPL-MCNC: 6.1 G/DL — SIGNIFICANT CHANGE UP (ref 6–8.3)
RBC # BLD: 4.15 M/UL — LOW (ref 4.2–5.8)
RBC # BLD: 4.15 M/UL — LOW (ref 4.2–5.8)
RBC # FLD: 13.2 % — SIGNIFICANT CHANGE UP (ref 10.3–14.5)
RBC # FLD: 13.2 % — SIGNIFICANT CHANGE UP (ref 10.3–14.5)
SODIUM SERPL-SCNC: 138 MMOL/L — SIGNIFICANT CHANGE UP (ref 135–145)
SODIUM SERPL-SCNC: 138 MMOL/L — SIGNIFICANT CHANGE UP (ref 135–145)
WBC # BLD: 10.2 K/UL — SIGNIFICANT CHANGE UP (ref 3.8–10.5)
WBC # BLD: 10.2 K/UL — SIGNIFICANT CHANGE UP (ref 3.8–10.5)
WBC # FLD AUTO: 10.2 K/UL — SIGNIFICANT CHANGE UP (ref 3.8–10.5)
WBC # FLD AUTO: 10.2 K/UL — SIGNIFICANT CHANGE UP (ref 3.8–10.5)

## 2023-10-28 PROCEDURE — 99232 SBSQ HOSP IP/OBS MODERATE 35: CPT

## 2023-10-28 PROCEDURE — 99222 1ST HOSP IP/OBS MODERATE 55: CPT

## 2023-10-28 PROCEDURE — 93010 ELECTROCARDIOGRAM REPORT: CPT

## 2023-10-28 RX ORDER — ERYTHROMYCIN BASE 5 MG/GRAM
1 OINTMENT (GRAM) OPHTHALMIC (EYE)
Refills: 0 | Status: DISCONTINUED | OUTPATIENT
Start: 2023-10-28 | End: 2023-10-30

## 2023-10-28 RX ORDER — CARBIDOPA AND LEVODOPA 25; 100 MG/1; MG/1
1 TABLET ORAL ONCE
Refills: 0 | Status: DISCONTINUED | OUTPATIENT
Start: 2023-10-28 | End: 2023-10-28

## 2023-10-28 RX ORDER — CARBIDOPA AND LEVODOPA 25; 100 MG/1; MG/1
1 TABLET ORAL ONCE
Refills: 0 | Status: COMPLETED | OUTPATIENT
Start: 2023-10-28 | End: 2023-10-28

## 2023-10-28 RX ORDER — CARBIDOPA AND LEVODOPA 25; 100 MG/1; MG/1
2 TABLET ORAL ONCE
Refills: 0 | Status: COMPLETED | OUTPATIENT
Start: 2023-10-28 | End: 2023-10-28

## 2023-10-28 RX ADMIN — ESCITALOPRAM OXALATE 20 MILLIGRAM(S): 10 TABLET, FILM COATED ORAL at 12:02

## 2023-10-28 RX ADMIN — Medication 0.5 MILLIGRAM(S): at 22:22

## 2023-10-28 RX ADMIN — GABAPENTIN 200 MILLIGRAM(S): 400 CAPSULE ORAL at 22:21

## 2023-10-28 RX ADMIN — GABAPENTIN 100 MILLIGRAM(S): 400 CAPSULE ORAL at 12:00

## 2023-10-28 RX ADMIN — CARBIDOPA AND LEVODOPA 3 CAPSULE(S): 25; 100 TABLET ORAL at 16:34

## 2023-10-28 RX ADMIN — Medication 1 APPLICATION(S): at 22:29

## 2023-10-28 RX ADMIN — Medication 500 MILLIGRAM(S): at 17:36

## 2023-10-28 RX ADMIN — Medication 1 APPLICATION(S): at 12:01

## 2023-10-28 RX ADMIN — POLYETHYLENE GLYCOL 3350 17 GRAM(S): 17 POWDER, FOR SOLUTION ORAL at 17:36

## 2023-10-28 RX ADMIN — CARBIDOPA AND LEVODOPA 1 CAPSULE(S): 25; 100 TABLET ORAL at 19:07

## 2023-10-28 RX ADMIN — ROTIGOTINE 1 PATCH: 8 PATCH, EXTENDED RELEASE TRANSDERMAL at 18:14

## 2023-10-28 RX ADMIN — ROTIGOTINE 1 PATCH: 8 PATCH, EXTENDED RELEASE TRANSDERMAL at 12:00

## 2023-10-28 RX ADMIN — Medication 500 MILLIGRAM(S): at 05:15

## 2023-10-28 RX ADMIN — CARBIDOPA AND LEVODOPA 3 CAPSULE(S): 25; 100 TABLET ORAL at 08:06

## 2023-10-28 RX ADMIN — CARBIDOPA AND LEVODOPA 3 CAPSULE(S): 25; 100 TABLET ORAL at 12:00

## 2023-10-28 RX ADMIN — Medication 1 APPLICATION(S): at 17:36

## 2023-10-28 RX ADMIN — FAMOTIDINE 20 MILLIGRAM(S): 10 INJECTION INTRAVENOUS at 17:36

## 2023-10-28 RX ADMIN — SENNA PLUS 2 TABLET(S): 8.6 TABLET ORAL at 22:22

## 2023-10-28 RX ADMIN — CARBIDOPA AND LEVODOPA 2 CAPSULE(S): 25; 100 TABLET ORAL at 20:08

## 2023-10-28 NOTE — DIETITIAN INITIAL EVALUATION ADULT - PERTINENT LABORATORY DATA
10-28    138  |  104  |  14  ----------------------------<  99  3.6   |  28  |  0.67    Ca    8.5      28 Oct 2023 07:15    TPro  6.1  /  Alb  2.8<L>  /  TBili  0.7  /  DBili  x   /  AST  9<L>  /  ALT  6<L>  /  AlkPhos  63  10-28

## 2023-10-28 NOTE — PROGRESS NOTE ADULT - SUBJECTIVE AND OBJECTIVE BOX
Chief complaint: no  new complaints     Patient is a 63y old  Male who presents with a chief complaint of Parkinson's Disease s/p Deep Brain Stimulator Implantation (29 Oct 2023 12:22)      HEALTH ISSUES - PROBLEM Dx:  Parkinsons disease    Anxiety and depression    S/P deep brain stimulator placement          PAST MEDICAL & SURGICAL HISTORY:  Parkinson disease      Anxiety and depression      Status post Mohs surgery      VITALS  Vital Signs Last 24 Hrs  T(C): 36.4 (29 Oct 2023 07:50), Max: 36.9 (28 Oct 2023 20:00)  T(F): 97.5 (29 Oct 2023 07:50), Max: 98.5 (28 Oct 2023 20:00)  HR: 69 (29 Oct 2023 07:50) (69 - 69)  BP: 145/83 (29 Oct 2023 07:50) (145/83 - 145/83)  BP(mean): --  RR: 16 (29 Oct 2023 07:50) (16 - 16)  SpO2: 96% (29 Oct 2023 07:50) (96% - 99%)    Parameters below as of 29 Oct 2023 07:50  Patient On (Oxygen Delivery Method): room air          PHYSICAL EXAM  Constitutional - NAD, Comfortable  HEENT - NCAT, EOMI  Neck - Supple, No limited ROM  Chest - CTA bilaterally  Cardiovascular - RRR, S1S2  Abdomen - BS+, Soft, NTND  Extremities -  No calf tenderness   Neurologic Exam -                    Cognitive - Awake, Alert     No new focal deficits            RECENT LABS                        12.1   10.20 )-----------( 260      ( 28 Oct 2023 07:15 )             39.3     10    138  |  104  |  14  ----------------------------<  99  3.6   |  28  |  0.67    Ca    8.5      28 Oct 2023 07:15    TPro  6.1  /  Alb  2.8<L>  /  TBili  0.7  /  DBili  x   /  AST  9<L>  /  ALT  6<L>  /  AlkPhos  63  10-28      Urinalysis Basic - ( 29 Oct 2023 13:00 )    Color: Dark Yellow / Appearance: Clear / S.028 / pH: x  Gluc: x / Ketone: 15 mg/dL  / Bili: Negative / Urobili: 1.0 mg/dL   Blood: x / Protein: Trace mg/dL / Nitrite: Negative   Leuk Esterase: Negative / RBC: x / WBC x   Sq Epi: x / Non Sq Epi: x / Bacteria: x          CURRENT MEDICATIONS    MEDICATIONS  (STANDING):  carbidopa 36.25 mG/levodopa 145 mG ER 3 Capsule(s) Oral <User Schedule>  cephalexin 500 milliGRAM(s) Oral every 12 hours  erythromycin   Ointment 1 Application(s) Both EYES four times a day  escitalopram 20 milliGRAM(s) Oral daily  famotidine    Tablet 20 milliGRAM(s) Oral every 12 hours  gabapentin 100 milliGRAM(s) Oral daily  gabapentin 200 milliGRAM(s) Oral at bedtime  lidocaine   4% Patch 2 Patch Transdermal daily  polyethylene glycol 3350 17 Gram(s) Oral two times a day  rotigotine patch 3 mG/24 Hr(s) 1 Patch Transdermal every 24 hours  senna 2 Tablet(s) Oral at bedtime    MEDICATIONS  (PRN):  acetaminophen     Tablet .. 650 milliGRAM(s) Oral every 6 hours PRN Mild Pain (1 - 3)  bisacodyl 5 milliGRAM(s) Oral daily PRN Constipation  clonazePAM Oral Disintegrating Tablet 0.5 milliGRAM(s) Oral two times a day PRN Anxiety    ASSESSMENT & PLAN          GI/Bowel Management    Management - Toilet Q2  Skin - Turn Q2  Pain - Tylenol PRN    Spoke with SLP - diet downgraded for safety to avoid aspiration  Aspiration and fall precautions are in place       Continue comprehensive acute rehab program consisting of 3hrs/day of OT/PT and SLP. Chief complaint: no  new complaints     Patient is a 63y old  Male who presents with a chief complaint of Parkinson's Disease s/p Deep Brain Stimulator Implantation (29 Oct 2023 12:22)      HEALTH ISSUES - PROBLEM Dx:  Parkinsons disease    Anxiety and depression    S/P deep brain stimulator placement          PAST MEDICAL & SURGICAL HISTORY:  Parkinson disease      Anxiety and depression      Status post Mohs surgery      VITALS  Vital Signs Last 24 Hrs  T(C): 36.4 (29 Oct 2023 07:50), Max: 36.9 (28 Oct 2023 20:00)  T(F): 97.5 (29 Oct 2023 07:50), Max: 98.5 (28 Oct 2023 20:00)  HR: 69 (29 Oct 2023 07:50) (69 - 69)  BP: 145/83 (29 Oct 2023 07:50) (145/83 - 145/83)  BP(mean): --  RR: 16 (29 Oct 2023 07:50) (16 - 16)  SpO2: 96% (29 Oct 2023 07:50) (96% - 99%)    Parameters below as of 29 Oct 2023 07:50  Patient On (Oxygen Delivery Method): room air          PHYSICAL EXAM  Constitutional - NAD, Comfortable  HEENT - NCAT, EOMI  Neck - Supple, No limited ROM  Chest - CTA bilaterally  Cardiovascular - RRR, S1S2  Abdomen - BS+, Soft, NTND  Extremities -  No calf tenderness   Neurologic Exam -                    Cognitive - Awake, Alert     No new focal deficits            RECENT LABS                        12.1   10.20 )-----------( 260      ( 28 Oct 2023 07:15 )             39.3     10    138  |  104  |  14  ----------------------------<  99  3.6   |  28  |  0.67    Ca    8.5      28 Oct 2023 07:15    TPro  6.1  /  Alb  2.8<L>  /  TBili  0.7  /  DBili  x   /  AST  9<L>  /  ALT  6<L>  /  AlkPhos  63  10-28      Urinalysis Basic - ( 29 Oct 2023 13:00 )    Color: Dark Yellow / Appearance: Clear / S.028 / pH: x  Gluc: x / Ketone: 15 mg/dL  / Bili: Negative / Urobili: 1.0 mg/dL   Blood: x / Protein: Trace mg/dL / Nitrite: Negative   Leuk Esterase: Negative / RBC: x / WBC x   Sq Epi: x / Non Sq Epi: x / Bacteria: x          CURRENT MEDICATIONS    MEDICATIONS  (STANDING):  carbidopa 36.25 mG/levodopa 145 mG ER 3 Capsule(s) Oral <User Schedule>  cephalexin 500 milliGRAM(s) Oral every 12 hours  erythromycin   Ointment 1 Application(s) Both EYES four times a day  escitalopram 20 milliGRAM(s) Oral daily  famotidine    Tablet 20 milliGRAM(s) Oral every 12 hours  gabapentin 100 milliGRAM(s) Oral daily  gabapentin 200 milliGRAM(s) Oral at bedtime  lidocaine   4% Patch 2 Patch Transdermal daily  polyethylene glycol 3350 17 Gram(s) Oral two times a day  rotigotine patch 3 mG/24 Hr(s) 1 Patch Transdermal every 24 hours  senna 2 Tablet(s) Oral at bedtime    MEDICATIONS  (PRN):  acetaminophen     Tablet .. 650 milliGRAM(s) Oral every 6 hours PRN Mild Pain (1 - 3)  bisacodyl 5 milliGRAM(s) Oral daily PRN Constipation  clonazePAM Oral Disintegrating Tablet 0.5 milliGRAM(s) Oral two times a day PRN Anxiety    ASSESSMENT & PLAN          GI/Bowel Management    Management - Toilet Q2  Skin - Turn Q2  Pain - Tylenol PRN    Spoke with SLP - diet downgraded for safety to avoid aspiration due to dysphagia  Aspiration and fall precautions are in place       Continue comprehensive acute rehab program consisting of 3hrs/day of OT/PT and SLP.

## 2023-10-28 NOTE — CONSULT NOTE ADULT - ASSESSMENT
ASSESSMENT/PLAN  63 year old male with PMH of severe Parkinson's disease, HTN, anxiety. He was diagnosed with Parkinson's disease in 2015 when he was having dragging of left leg, and difficulty moving. He was started on levodopa and it worked well for a few years. He now has B/L stiffness, and slowness, but no tremor. He is also experiencing neck discomfort and tilting of his neck forward. He has severe on/off motor fluctuations. He has sudden severe wearing offs of medications, and effects of medication lasts 1-2 hours then he has severe stiffness and slowness. He moves his neck better when he takes the medication. He experiencing freezing of gait, and he can't speak or think at those times. He walks with walker most of the time, and he can usually do his own bathing/dressing/toileting/feeding most days, but sometimes needs help. He is s/p Stage 1 Deep Brain Surgery Bilateral Implantation Into Globus Pallidus Internus With Leksell Frame on 10/25/23 and plan for Stage 2 on 10/30/23. He tolerated procedure well. He was evaluated for admission to acute inpatient rehab and admitted to [facility] on 10/27/23.      #Parkinson's Disease   #s/p DBS stage I  # gait Instability, ADL impairments and Functional impairments  - medically stable to start Comprehensive Rehab Program outlined by PMR  -Plan for Stage 2 on 10/30/23 -NPO 2359 on 10/29  -Continue Keflex 500mg BID for post-op ppx   -Continue Rytary 145mg 3 tabs at 8a, 12pm, 4pm and 8pm   -Continue Neupro patch 4mg /24hours daily  -Movement disorders     #anxiety  -Continue lexapro 20mg daily  -Continue Clonazepam 0.5mg BID PRN  -Neuropsych to follow    #Orthostatic hypotension  -Monitor OH vital signs    #Pain control  - Tylenol PRN  - Lidocaine patches to b/l knees  -Continue gabapentin 100mg in am and 200mg at bedtime     #DVT Prophylaxis  - TEDs   - pharmacologic ppx contraindicated given recent surgery and upcoming surgery

## 2023-10-28 NOTE — DIETITIAN INITIAL EVALUATION ADULT - ADD RECOMMEND
1. Continue Pureed diet w/ mildly thick liquids.   2. Recommend  Magic Cup (290 kcal, 9 grams of protein) TID to assist pt in meeting estimated protein energy needs.  3. Monitor PO intake, GI tolerance, skin integrity, labs, weight, and bowel movement regularity.   4. Honor food preferences as feasible. Assist with meals PRN and encourage PO intake.  5. Follow SLP recommendations  6. Provide ongoing diet education as needed  7. RD remains available upon request and will follow-up per protocol
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
## 2023-10-28 NOTE — DIETITIAN INITIAL EVALUATION ADULT - OTHER INFO
History of Present Illness:  Reason for Admission: Parkinson's Disease s/p Deep Brain Stimulator Implantation  History of Present Illness:   63 year old male with PMH of severe Parkinson's disease, HTN, anxiety. He was diagnosed with Parkinson's disease in 2015 when he was having dragging of left leg, and difficulty moving. He was started on levodopa and it worked well for a few years. He now has B/L stiffness, and slowness, but no tremor. He is also experiencing neck discomfort and tilting of his neck forward. He has severe on/off motor fluctuations. He has sudden severe wearing offs of medications, and effects of medication lasts 1-2 hours then he has severe stiffness and slowness. He moves his neck better when he takes the medication. He experiencing freezing of gait, and he can't speak or think at those times. He walks with walker most of the time, and he can usually do his own bathing/dressing/toileting/feeding most days, but sometimes needs help. He is s/p Stage 1 Deep Brain Surgery Bilateral Implantation Into Globus Pallidus Internus With Leksell Frame on 10/25/23 and plan for Stage 2 on 10/30/23. He tolerated procedure well. He was evaluated for admission to acute inpatient rehab and admitted to [facility] on 10/27/23.     Patient overall reports feeling "good". Wife at bedside to supplement history. He denies headache, blurry vision, dizziness or lightheadedness. Last BM was on 10/24. No N/V/fever. Continent to bladder. Confirmed patient to be receiving Stage 2 on 10/30 and reminded patient who believed it was tomorrow. Patient tired appearing but following commands and conversational. Oriented to self, location, month, and present. Did respond 1984 when asked the year.     At this time patient tolerating current pureed diet texture down graded today (10/28)  Observed during lunch with poor PO intake. Recommend Magic Cup (290 kcal, 9 grams of protein) TID to assist patient in meeting estimated energy requirements. No reported N/V/C/D, last BM 10/27 Per nursing flowsheets. Weight history obtained questionable states  lb x 1 m ago, CBW 223lb 10/27.  Patient s/p deep brain stimulation stage 1, encouraged intake of HBV protein at meals (apart from Sinemet) to assist in wound healing.

## 2023-10-28 NOTE — DIETITIAN INITIAL EVALUATION ADULT - NS FNS DIET ORDER
Diet, Pureed:   Mildly Thick Liquids (MILDTHICKLIQS) (10-28-23 @ 12:20)  Diet, NPO:   NPO for Procedure/Test     NPO Start Date: 29-Oct-2023,   NPO Start Time: 23:59  Except Medications (10-27-23 @ 14:46)

## 2023-10-28 NOTE — PROVIDER CONTACT NOTE (MEDICATION) - SITUATION
pt c/o pain related to PD, last dose of carvidopa administered around 1600, next dose id due at 2000, requesting it earlier than scheduled

## 2023-10-28 NOTE — DIETITIAN INITIAL EVALUATION ADULT - ORAL INTAKE PTA/DIET HISTORY
Patient unable to provide complete diet history at this time, states he dislikes octopus and yogurt. Chart reviewed. NKFA nor food intolerances reported

## 2023-10-29 ENCOUNTER — TRANSCRIPTION ENCOUNTER (OUTPATIENT)
Age: 63
End: 2023-10-29

## 2023-10-29 ENCOUNTER — NON-APPOINTMENT (OUTPATIENT)
Age: 63
End: 2023-10-29

## 2023-10-29 LAB
APPEARANCE UR: CLEAR — SIGNIFICANT CHANGE UP
APPEARANCE UR: CLEAR — SIGNIFICANT CHANGE UP
BILIRUB UR-MCNC: NEGATIVE — SIGNIFICANT CHANGE UP
BILIRUB UR-MCNC: NEGATIVE — SIGNIFICANT CHANGE UP
COLOR SPEC: SIGNIFICANT CHANGE UP
COLOR SPEC: SIGNIFICANT CHANGE UP
DIFF PNL FLD: NEGATIVE — SIGNIFICANT CHANGE UP
DIFF PNL FLD: NEGATIVE — SIGNIFICANT CHANGE UP
GLUCOSE UR QL: NEGATIVE MG/DL — SIGNIFICANT CHANGE UP
GLUCOSE UR QL: NEGATIVE MG/DL — SIGNIFICANT CHANGE UP
KETONES UR-MCNC: 15 MG/DL
KETONES UR-MCNC: 15 MG/DL
LEUKOCYTE ESTERASE UR-ACNC: NEGATIVE — SIGNIFICANT CHANGE UP
LEUKOCYTE ESTERASE UR-ACNC: NEGATIVE — SIGNIFICANT CHANGE UP
NITRITE UR-MCNC: NEGATIVE — SIGNIFICANT CHANGE UP
NITRITE UR-MCNC: NEGATIVE — SIGNIFICANT CHANGE UP
PH UR: 6 — SIGNIFICANT CHANGE UP (ref 5–8)
PH UR: 6 — SIGNIFICANT CHANGE UP (ref 5–8)
PROT UR-MCNC: SIGNIFICANT CHANGE UP MG/DL
PROT UR-MCNC: SIGNIFICANT CHANGE UP MG/DL
SP GR SPEC: 1.03 — SIGNIFICANT CHANGE UP (ref 1–1.03)
SP GR SPEC: 1.03 — SIGNIFICANT CHANGE UP (ref 1–1.03)
UROBILINOGEN FLD QL: 1 MG/DL — SIGNIFICANT CHANGE UP (ref 0.2–1)
UROBILINOGEN FLD QL: 1 MG/DL — SIGNIFICANT CHANGE UP (ref 0.2–1)

## 2023-10-29 PROCEDURE — 99232 SBSQ HOSP IP/OBS MODERATE 35: CPT

## 2023-10-29 RX ORDER — CARBIDOPA AND LEVODOPA 25; 100 MG/1; MG/1
1 TABLET ORAL ONCE
Refills: 0 | Status: COMPLETED | OUTPATIENT
Start: 2023-10-29 | End: 2023-10-29

## 2023-10-29 RX ORDER — CARBIDOPA AND LEVODOPA 25; 100 MG/1; MG/1
3 TABLET ORAL ONCE
Refills: 0 | Status: DISCONTINUED | OUTPATIENT
Start: 2023-10-29 | End: 2023-10-29

## 2023-10-29 RX ADMIN — CARBIDOPA AND LEVODOPA 3 CAPSULE(S): 25; 100 TABLET ORAL at 08:10

## 2023-10-29 RX ADMIN — ESCITALOPRAM OXALATE 20 MILLIGRAM(S): 10 TABLET, FILM COATED ORAL at 12:02

## 2023-10-29 RX ADMIN — CARBIDOPA AND LEVODOPA 3 CAPSULE(S): 25; 100 TABLET ORAL at 20:00

## 2023-10-29 RX ADMIN — Medication 0.5 MILLIGRAM(S): at 05:52

## 2023-10-29 RX ADMIN — Medication 1 APPLICATION(S): at 05:23

## 2023-10-29 RX ADMIN — ROTIGOTINE 1 PATCH: 8 PATCH, EXTENDED RELEASE TRANSDERMAL at 11:55

## 2023-10-29 RX ADMIN — ROTIGOTINE 1 PATCH: 8 PATCH, EXTENDED RELEASE TRANSDERMAL at 18:22

## 2023-10-29 RX ADMIN — CARBIDOPA AND LEVODOPA 3 CAPSULE(S): 25; 100 TABLET ORAL at 12:01

## 2023-10-29 RX ADMIN — CARBIDOPA AND LEVODOPA 1 CAPSULE(S): 25; 100 TABLET ORAL at 06:20

## 2023-10-29 RX ADMIN — GABAPENTIN 100 MILLIGRAM(S): 400 CAPSULE ORAL at 12:01

## 2023-10-29 RX ADMIN — Medication 1 APPLICATION(S): at 17:39

## 2023-10-29 RX ADMIN — Medication 0.5 MILLIGRAM(S): at 17:42

## 2023-10-29 RX ADMIN — Medication 650 MILLIGRAM(S): at 17:42

## 2023-10-29 RX ADMIN — POLYETHYLENE GLYCOL 3350 17 GRAM(S): 17 POWDER, FOR SOLUTION ORAL at 17:14

## 2023-10-29 RX ADMIN — Medication 1 APPLICATION(S): at 12:02

## 2023-10-29 RX ADMIN — Medication 5 MILLIGRAM(S): at 17:42

## 2023-10-29 RX ADMIN — Medication 500 MILLIGRAM(S): at 17:48

## 2023-10-29 RX ADMIN — CARBIDOPA AND LEVODOPA 3 CAPSULE(S): 25; 100 TABLET ORAL at 16:03

## 2023-10-29 RX ADMIN — LIDOCAINE 2 PATCH: 4 CREAM TOPICAL at 18:22

## 2023-10-29 RX ADMIN — ROTIGOTINE 1 PATCH: 8 PATCH, EXTENDED RELEASE TRANSDERMAL at 12:01

## 2023-10-29 RX ADMIN — FAMOTIDINE 20 MILLIGRAM(S): 10 INJECTION INTRAVENOUS at 17:42

## 2023-10-29 RX ADMIN — ROTIGOTINE 1 PATCH: 8 PATCH, EXTENDED RELEASE TRANSDERMAL at 07:30

## 2023-10-29 RX ADMIN — GABAPENTIN 200 MILLIGRAM(S): 400 CAPSULE ORAL at 20:00

## 2023-10-29 RX ADMIN — Medication 500 MILLIGRAM(S): at 07:01

## 2023-10-29 RX ADMIN — SENNA PLUS 2 TABLET(S): 8.6 TABLET ORAL at 20:03

## 2023-10-29 RX ADMIN — Medication 650 MILLIGRAM(S): at 18:31

## 2023-10-29 RX ADMIN — LIDOCAINE 2 PATCH: 4 CREAM TOPICAL at 12:03

## 2023-10-29 NOTE — PROGRESS NOTE ADULT - ASSESSMENT
ASSESSMENT/PLAN  63 year old male with PMH of severe Parkinson's disease, HTN, anxiety. He was diagnosed with Parkinson's disease in 2015 when he was having dragging of left leg, and difficulty moving. He was started on levodopa and it worked well for a few years. He now has B/L stiffness, and slowness, but no tremor. He is also experiencing neck discomfort and tilting of his neck forward. He has severe on/off motor fluctuations. He has sudden severe wearing offs of medications, and effects of medication lasts 1-2 hours then he has severe stiffness and slowness. He moves his neck better when he takes the medication. He experiencing freezing of gait, and he can't speak or think at those times. He walks with walker most of the time, and he can usually do his own bathing/dressing/toileting/feeding most days, but sometimes needs help. He is s/p Stage 1 Deep Brain Surgery Bilateral Implantation Into Globus Pallidus Internus With Leksell Frame on 10/25/23 and plan for Stage 2 on 10/30/23. He tolerated procedure well. He was evaluated for admission to acute inpatient rehab and admitted to [facility] on 10/27/23.      #Parkinson's Disease   #s/p DBS stage I  # gait Instability, ADL impairments and Functional impairments  - continue comprehensive acute Rehab Program   -Plan for Stage 2 on 10/30/23 -NPO 2359 on 10/29 / there is no contraindication to proceed w/ stage 2 from a medical standpoint may proceed w/ surgery without evidence of acute congestive heart failure or ACS  -Continue Keflex 500mg BID for post-op ppx   -Continue Rytary 145mg 3 tabs at 8a, 12pm, 4pm and 8pm   -Continue Neupro patch 4mg /24hours daily  -Movement disorders     #anxiety  -Continue lexapro 20mg daily  -Continue Clonazepam 0.5mg BID PRN  -Neuropsych to follow    #Orthostatic hypotension  -Monitor [place] vital signs    #Pain control  - Tylenol PRN  - Lidocaine patches to b/l knees  -Continue gabapentin 100mg in am and 200mg at bedtime     #DVT Prophylaxis  - TEDs   - pharmacologic ppx contraindicated given recent surgery and upcoming surgery

## 2023-10-29 NOTE — PROGRESS NOTE ADULT - NSPROGADDITIONALINFOA_GEN_ALL_CORE
I have personally interviewed and examined this patient, reviewed pertinent clinical information, and performed the evaluation and management services provided at today's visit for inpatient medical follow up    I am available to discuss any issues related to the medical care of this patient on the unit this morning, through [employer] Teams Chat or by phone at [phone]

## 2023-10-29 NOTE — PROGRESS NOTE ADULT - SUBJECTIVE AND OBJECTIVE BOX
Chief complaint: no new complaints, calmer now- was agitated this morning. Denies HA, new complaints, no fever.     Patient is a 63y old  Male who presents with a chief complaint of Parkinson's Disease s/p Deep Brain Stimulator Implantation (29 Oct 2023 07:37)        HEALTH ISSUES - PROBLEM Dx:  Parkinsons disease    Anxiety and depression    S/P deep brain stimulator placement        PAST MEDICAL & SURGICAL HISTORY:  Parkinson disease      Anxiety and depression      Status post Mohs surgery          VITALS  Vital Signs Last 24 Hrs  T(C): 36.4 (29 Oct 2023 07:50), Max: 36.9 (28 Oct 2023 20:00)  T(F): 97.5 (29 Oct 2023 07:50), Max: 98.5 (28 Oct 2023 20:00)  HR: 69 (29 Oct 2023 07:50) (69 - 69)  BP: 145/83 (29 Oct 2023 07:50) (145/83 - 145/83)  BP(mean): --  RR: 16 (29 Oct 2023 07:50) (16 - 16)  SpO2: 96% (29 Oct 2023 07:50) (96% - 99%)    Parameters below as of 29 Oct 2023 07:50  Patient On (Oxygen Delivery Method): room air          PHYSICAL EXAM  Constitutional - NAD, Comfortable  HEENT - NCAT, EOMI  Neck - Supple, No limited ROM  Chest - CTA bilaterally  Cardiovascular - RRR, S1S2  Abdomen -  Soft, NTND  Extremities -  No calf tenderness   Neurologic Exam -                    Cognitive - Awake, Alert     No new focal deficits                      RECENT LABS                        12.1   10.20 )-----------( 260      ( 28 Oct 2023 07:15 )             39.3     10-28    138  |  104  |  14  ----------------------------<  99  3.6   |  28  |  0.67    Ca    8.5      28 Oct 2023 07:15    TPro  6.1  /  Alb  2.8<L>  /  TBili  0.7  /  DBili  x   /  AST  9<L>  /  ALT  6<L>  /  AlkPhos  63  10-28      Urinalysis Basic - ( 28 Oct 2023 07:15 )    Color: x / Appearance: x / SG: x / pH: x  Gluc: 99 mg/dL / Ketone: x  / Bili: x / Urobili: x   Blood: x / Protein: x / Nitrite: x   Leuk Esterase: x / RBC: x / WBC x   Sq Epi: x / Non Sq Epi: x / Bacteria: x          CURRENT MEDICATIONS    MEDICATIONS  (STANDING):  carbidopa 36.25 mG/levodopa 145 mG ER 3 Capsule(s) Oral <User Schedule>  cephalexin 500 milliGRAM(s) Oral every 12 hours  erythromycin   Ointment 1 Application(s) Both EYES four times a day  escitalopram 20 milliGRAM(s) Oral daily  famotidine    Tablet 20 milliGRAM(s) Oral every 12 hours  gabapentin 100 milliGRAM(s) Oral daily  gabapentin 200 milliGRAM(s) Oral at bedtime  lidocaine   4% Patch 2 Patch Transdermal daily  polyethylene glycol 3350 17 Gram(s) Oral two times a day  rotigotine patch 3 mG/24 Hr(s) 1 Patch Transdermal every 24 hours  senna 2 Tablet(s) Oral at bedtime    MEDICATIONS  (PRN):  acetaminophen     Tablet .. 650 milliGRAM(s) Oral every 6 hours PRN Mild Pain (1 - 3)  bisacodyl 5 milliGRAM(s) Oral daily PRN Constipation  clonazePAM Oral Disintegrating Tablet 0.5 milliGRAM(s) Oral two times a day PRN Anxiety    ASSESSMENT & PLAN          GI/Bowel Management    Management - Toilet Q2  Skin - Turn Q2  Pain - Tylenol PRN      Stage 2  / DBS placement in the morning  NPO after midnight  Encourage PO nutrition (on modified diet as per SLP)   Wife at bedside - detailed update give, all questions answered to her satisfaction   Staff reports patient and wife being very confrontation with each other.   Emotional support provided for patient and wife.           Continue comprehensive acute rehab program consisting of 3hrs/day of OT/PT and SLP.

## 2023-10-29 NOTE — PROGRESS NOTE ADULT - REASON FOR ADMISSION
Parkinson's Disease s/p Deep Brain Stimulator Implantation

## 2023-10-29 NOTE — PROGRESS NOTE ADULT - SUBJECTIVE AND OBJECTIVE BOX
Patient is a 63y old  Male who presents with a chief complaint of Parkinson disease     (28 Oct 2023 13:14)      Patient examined and interviewed. There were no acute medical events yesterday or overnight and patient is without complaints this morning.  Wife at bedside states he "acted up a little bit " last night but no other issues    REVIEW OF SYMPTOMS: patient denies HA's, CP, palpitations, shortness of breath or upper respiratory symptoms, nausea, vomiting, diarrhea, constipation, dysuria, bruising/bleeding and all other systems were reviewed as negative      ALLERGIES:  No Known Allergies    MEDICATIONS  (STANDING):  carbidopa 36.25 mG/levodopa 145 mG ER 3 Capsule(s) Oral <User Schedule>  cephalexin 500 milliGRAM(s) Oral every 12 hours  erythromycin   Ointment 1 Application(s) Both EYES four times a day  escitalopram 20 milliGRAM(s) Oral daily  famotidine    Tablet 20 milliGRAM(s) Oral every 12 hours  gabapentin 100 milliGRAM(s) Oral daily  gabapentin 200 milliGRAM(s) Oral at bedtime  lidocaine   4% Patch 2 Patch Transdermal daily  polyethylene glycol 3350 17 Gram(s) Oral two times a day  rotigotine patch 3 mG/24 Hr(s) 1 Patch Transdermal every 24 hours  senna 2 Tablet(s) Oral at bedtime    MEDICATIONS  (PRN):  acetaminophen     Tablet .. 650 milliGRAM(s) Oral every 6 hours PRN Mild Pain (1 - 3)  bisacodyl 5 milliGRAM(s) Oral daily PRN Constipation  clonazePAM Oral Disintegrating Tablet 0.5 milliGRAM(s) Oral two times a day PRN Anxiety    Vital Signs Last 24 Hrs  T(F): 98.5 (28 Oct 2023 20:00), Max: 98.5 (28 Oct 2023 20:00)  HR: 68 (28 Oct 2023 07:50) (68 - 68)  BP: 138/76 (28 Oct 2023 07:50) (138/76 - 138/76)  RR: 16 (28 Oct 2023 20:00) (16 - 16)  SpO2: 99% (28 Oct 2023 20:00) (99% - 99%)  I&O's Summary    BMI (kg/m2): 28.6 (10-27-23 @ 19:58)          PHYSICAL EXAM:  General: NAD, Awake and alert, responsive;   ENT: MMM craniotomy w/ staples left forehead c/d/i  Neck: Supple, No JVD  Lungs: Clear to auscultation bilaterally  Cardio: RRR, S1/S2, No murmurs  Abdomen: Soft, Nontender, Nondistended; Bowel sounds present  Extremities: No calf tenderness, No pitting edema      LABS:                        12.1   10.20 )-----------( 260      ( 28 Oct 2023 07:15 )             39.3       10-28    138  |  104  |  14  ----------------------------<  99  3.6   |  28  |  0.67    Ca    8.5      28 Oct 2023 07:15    TPro  6.1  /  Alb  2.8  /  TBili  0.7  /  DBili  x   /  AST  9   /  ALT  6   /  AlkPhos  63  10-28                            Urinalysis Basic - ( 28 Oct 2023 07:15 )    Color: x / Appearance: x / SG: x / pH: x  Gluc: 99 mg/dL / Ketone: x  / Bili: x / Urobili: x   Blood: x / Protein: x / Nitrite: x   Leuk Esterase: x / RBC: x / WBC x   Sq Epi: x / Non Sq Epi: x / Bacteria: x        COVID-19 PCR: Ezekiel (10-27-23 @ 19:57)

## 2023-10-30 ENCOUNTER — TRANSCRIPTION ENCOUNTER (OUTPATIENT)
Age: 63
End: 2023-10-30

## 2023-10-30 ENCOUNTER — RESULT REVIEW (OUTPATIENT)
Age: 63
End: 2023-10-30

## 2023-10-30 ENCOUNTER — NON-APPOINTMENT (OUTPATIENT)
Age: 63
End: 2023-10-30

## 2023-10-30 ENCOUNTER — APPOINTMENT (OUTPATIENT)
Dept: NEUROSURGERY | Facility: HOSPITAL | Age: 63
End: 2023-10-30

## 2023-10-30 ENCOUNTER — OUTPATIENT (OUTPATIENT)
Dept: OUTPATIENT SERVICES | Facility: HOSPITAL | Age: 63
LOS: 1 days | Discharge: HOME | End: 2023-10-30
Payer: MEDICARE

## 2023-10-30 VITALS
HEART RATE: 67 BPM | HEIGHT: 72 IN | WEIGHT: 222.01 LBS | RESPIRATION RATE: 18 BRPM | OXYGEN SATURATION: 96 % | DIASTOLIC BLOOD PRESSURE: 82 MMHG | SYSTOLIC BLOOD PRESSURE: 143 MMHG | TEMPERATURE: 98 F

## 2023-10-30 VITALS
RESPIRATION RATE: 16 BRPM | OXYGEN SATURATION: 95 % | SYSTOLIC BLOOD PRESSURE: 130 MMHG | HEART RATE: 66 BPM | DIASTOLIC BLOOD PRESSURE: 80 MMHG | TEMPERATURE: 98 F

## 2023-10-30 DIAGNOSIS — R07.9 CHEST PAIN, UNSPECIFIED: ICD-10-CM

## 2023-10-30 DIAGNOSIS — Z98.890 OTHER SPECIFIED POSTPROCEDURAL STATES: Chronic | ICD-10-CM

## 2023-10-30 LAB
ALBUMIN SERPL ELPH-MCNC: 2.8 G/DL — LOW (ref 3.3–5)
ALBUMIN SERPL ELPH-MCNC: 2.8 G/DL — LOW (ref 3.3–5)
ALP SERPL-CCNC: 64 U/L — SIGNIFICANT CHANGE UP (ref 40–120)
ALP SERPL-CCNC: 64 U/L — SIGNIFICANT CHANGE UP (ref 40–120)
ALT FLD-CCNC: 8 U/L — LOW (ref 10–45)
ALT FLD-CCNC: 8 U/L — LOW (ref 10–45)
ANION GAP SERPL CALC-SCNC: 6 MMOL/L — SIGNIFICANT CHANGE UP (ref 5–17)
ANION GAP SERPL CALC-SCNC: 6 MMOL/L — SIGNIFICANT CHANGE UP (ref 5–17)
AST SERPL-CCNC: 10 U/L — SIGNIFICANT CHANGE UP (ref 10–40)
AST SERPL-CCNC: 10 U/L — SIGNIFICANT CHANGE UP (ref 10–40)
BASOPHILS # BLD AUTO: 0.05 K/UL — SIGNIFICANT CHANGE UP (ref 0–0.2)
BASOPHILS # BLD AUTO: 0.05 K/UL — SIGNIFICANT CHANGE UP (ref 0–0.2)
BASOPHILS NFR BLD AUTO: 0.6 % — SIGNIFICANT CHANGE UP (ref 0–2)
BASOPHILS NFR BLD AUTO: 0.6 % — SIGNIFICANT CHANGE UP (ref 0–2)
BILIRUB SERPL-MCNC: 0.6 MG/DL — SIGNIFICANT CHANGE UP (ref 0.2–1.2)
BILIRUB SERPL-MCNC: 0.6 MG/DL — SIGNIFICANT CHANGE UP (ref 0.2–1.2)
BUN SERPL-MCNC: 15 MG/DL — SIGNIFICANT CHANGE UP (ref 7–23)
BUN SERPL-MCNC: 15 MG/DL — SIGNIFICANT CHANGE UP (ref 7–23)
CALCIUM SERPL-MCNC: 8.7 MG/DL — SIGNIFICANT CHANGE UP (ref 8.4–10.5)
CALCIUM SERPL-MCNC: 8.7 MG/DL — SIGNIFICANT CHANGE UP (ref 8.4–10.5)
CHLORIDE SERPL-SCNC: 102 MMOL/L — SIGNIFICANT CHANGE UP (ref 96–108)
CHLORIDE SERPL-SCNC: 102 MMOL/L — SIGNIFICANT CHANGE UP (ref 96–108)
CO2 SERPL-SCNC: 31 MMOL/L — SIGNIFICANT CHANGE UP (ref 22–31)
CO2 SERPL-SCNC: 31 MMOL/L — SIGNIFICANT CHANGE UP (ref 22–31)
CREAT SERPL-MCNC: 0.72 MG/DL — SIGNIFICANT CHANGE UP (ref 0.5–1.3)
CREAT SERPL-MCNC: 0.72 MG/DL — SIGNIFICANT CHANGE UP (ref 0.5–1.3)
EGFR: 103 ML/MIN/1.73M2 — SIGNIFICANT CHANGE UP
EGFR: 103 ML/MIN/1.73M2 — SIGNIFICANT CHANGE UP
EOSINOPHIL # BLD AUTO: 0.23 K/UL — SIGNIFICANT CHANGE UP (ref 0–0.5)
EOSINOPHIL # BLD AUTO: 0.23 K/UL — SIGNIFICANT CHANGE UP (ref 0–0.5)
EOSINOPHIL NFR BLD AUTO: 2.7 % — SIGNIFICANT CHANGE UP (ref 0–6)
EOSINOPHIL NFR BLD AUTO: 2.7 % — SIGNIFICANT CHANGE UP (ref 0–6)
GLUCOSE SERPL-MCNC: 103 MG/DL — HIGH (ref 70–99)
GLUCOSE SERPL-MCNC: 103 MG/DL — HIGH (ref 70–99)
HCT VFR BLD CALC: 39 % — SIGNIFICANT CHANGE UP (ref 39–50)
HCT VFR BLD CALC: 39 % — SIGNIFICANT CHANGE UP (ref 39–50)
HGB BLD-MCNC: 12.3 G/DL — LOW (ref 13–17)
HGB BLD-MCNC: 12.3 G/DL — LOW (ref 13–17)
IMM GRANULOCYTES NFR BLD AUTO: 0.2 % — SIGNIFICANT CHANGE UP (ref 0–0.9)
IMM GRANULOCYTES NFR BLD AUTO: 0.2 % — SIGNIFICANT CHANGE UP (ref 0–0.9)
LYMPHOCYTES # BLD AUTO: 1.79 K/UL — SIGNIFICANT CHANGE UP (ref 1–3.3)
LYMPHOCYTES # BLD AUTO: 1.79 K/UL — SIGNIFICANT CHANGE UP (ref 1–3.3)
LYMPHOCYTES # BLD AUTO: 20.7 % — SIGNIFICANT CHANGE UP (ref 13–44)
LYMPHOCYTES # BLD AUTO: 20.7 % — SIGNIFICANT CHANGE UP (ref 13–44)
MCHC RBC-ENTMCNC: 29.7 PG — SIGNIFICANT CHANGE UP (ref 27–34)
MCHC RBC-ENTMCNC: 29.7 PG — SIGNIFICANT CHANGE UP (ref 27–34)
MCHC RBC-ENTMCNC: 31.5 GM/DL — LOW (ref 32–36)
MCHC RBC-ENTMCNC: 31.5 GM/DL — LOW (ref 32–36)
MCV RBC AUTO: 94.2 FL — SIGNIFICANT CHANGE UP (ref 80–100)
MCV RBC AUTO: 94.2 FL — SIGNIFICANT CHANGE UP (ref 80–100)
MONOCYTES # BLD AUTO: 0.78 K/UL — SIGNIFICANT CHANGE UP (ref 0–0.9)
MONOCYTES # BLD AUTO: 0.78 K/UL — SIGNIFICANT CHANGE UP (ref 0–0.9)
MONOCYTES NFR BLD AUTO: 9 % — SIGNIFICANT CHANGE UP (ref 2–14)
MONOCYTES NFR BLD AUTO: 9 % — SIGNIFICANT CHANGE UP (ref 2–14)
NEUTROPHILS # BLD AUTO: 5.78 K/UL — SIGNIFICANT CHANGE UP (ref 1.8–7.4)
NEUTROPHILS # BLD AUTO: 5.78 K/UL — SIGNIFICANT CHANGE UP (ref 1.8–7.4)
NEUTROPHILS NFR BLD AUTO: 66.8 % — SIGNIFICANT CHANGE UP (ref 43–77)
NEUTROPHILS NFR BLD AUTO: 66.8 % — SIGNIFICANT CHANGE UP (ref 43–77)
NRBC # BLD: 0 /100 WBCS — SIGNIFICANT CHANGE UP (ref 0–0)
NRBC # BLD: 0 /100 WBCS — SIGNIFICANT CHANGE UP (ref 0–0)
PLATELET # BLD AUTO: 312 K/UL — SIGNIFICANT CHANGE UP (ref 150–400)
PLATELET # BLD AUTO: 312 K/UL — SIGNIFICANT CHANGE UP (ref 150–400)
POTASSIUM SERPL-MCNC: 3.7 MMOL/L — SIGNIFICANT CHANGE UP (ref 3.5–5.3)
POTASSIUM SERPL-MCNC: 3.7 MMOL/L — SIGNIFICANT CHANGE UP (ref 3.5–5.3)
POTASSIUM SERPL-SCNC: 3.7 MMOL/L — SIGNIFICANT CHANGE UP (ref 3.5–5.3)
POTASSIUM SERPL-SCNC: 3.7 MMOL/L — SIGNIFICANT CHANGE UP (ref 3.5–5.3)
PROT SERPL-MCNC: 6.3 G/DL — SIGNIFICANT CHANGE UP (ref 6–8.3)
PROT SERPL-MCNC: 6.3 G/DL — SIGNIFICANT CHANGE UP (ref 6–8.3)
RBC # BLD: 4.14 M/UL — LOW (ref 4.2–5.8)
RBC # BLD: 4.14 M/UL — LOW (ref 4.2–5.8)
RBC # FLD: 13.1 % — SIGNIFICANT CHANGE UP (ref 10.3–14.5)
RBC # FLD: 13.1 % — SIGNIFICANT CHANGE UP (ref 10.3–14.5)
SODIUM SERPL-SCNC: 139 MMOL/L — SIGNIFICANT CHANGE UP (ref 135–145)
SODIUM SERPL-SCNC: 139 MMOL/L — SIGNIFICANT CHANGE UP (ref 135–145)
WBC # BLD: 8.65 K/UL — SIGNIFICANT CHANGE UP (ref 3.8–10.5)
WBC # BLD: 8.65 K/UL — SIGNIFICANT CHANGE UP (ref 3.8–10.5)
WBC # FLD AUTO: 8.65 K/UL — SIGNIFICANT CHANGE UP (ref 3.8–10.5)
WBC # FLD AUTO: 8.65 K/UL — SIGNIFICANT CHANGE UP (ref 3.8–10.5)

## 2023-10-30 PROCEDURE — 61886 IMPLANT NEUROSTIM ARRAYS: CPT | Mod: 58

## 2023-10-30 PROCEDURE — 93005 ELECTROCARDIOGRAM TRACING: CPT

## 2023-10-30 PROCEDURE — 86803 HEPATITIS C AB TEST: CPT

## 2023-10-30 PROCEDURE — 97163 PT EVAL HIGH COMPLEX 45 MIN: CPT

## 2023-10-30 PROCEDURE — 71045 X-RAY EXAM CHEST 1 VIEW: CPT | Mod: 26

## 2023-10-30 PROCEDURE — 85027 COMPLETE CBC AUTOMATED: CPT

## 2023-10-30 PROCEDURE — 92610 EVALUATE SWALLOWING FUNCTION: CPT

## 2023-10-30 PROCEDURE — 36415 COLL VENOUS BLD VENIPUNCTURE: CPT

## 2023-10-30 PROCEDURE — 87635 SARS-COV-2 COVID-19 AMP PRB: CPT

## 2023-10-30 PROCEDURE — 80053 COMPREHEN METABOLIC PANEL: CPT

## 2023-10-30 PROCEDURE — 85025 COMPLETE CBC W/AUTO DIFF WBC: CPT

## 2023-10-30 PROCEDURE — 97167 OT EVAL HIGH COMPLEX 60 MIN: CPT

## 2023-10-30 DEVICE — PRGRMR PATIENT DBS PERCEPT: Type: IMPLANTABLE DEVICE | Status: FUNCTIONAL

## 2023-10-30 DEVICE — NEURO STIM PERCEPT PC: Type: IMPLANTABLE DEVICE | Status: FUNCTIONAL

## 2023-10-30 DEVICE — PLUG CONNECTOR PORT NEUROSTIM: Type: IMPLANTABLE DEVICE | Status: FUNCTIONAL

## 2023-10-30 DEVICE — EXT SENSIGHT DBS 60CM: Type: IMPLANTABLE DEVICE | Status: FUNCTIONAL

## 2023-10-30 RX ORDER — GABAPENTIN 400 MG/1
2 CAPSULE ORAL
Qty: 0 | Refills: 0 | DISCHARGE
Start: 2023-10-30

## 2023-10-30 RX ORDER — ROTIGOTINE 8 MG/24H
1 PATCH, EXTENDED RELEASE TRANSDERMAL
Refills: 0 | DISCHARGE

## 2023-10-30 RX ORDER — GABAPENTIN 400 MG/1
2 CAPSULE ORAL
Refills: 0 | DISCHARGE

## 2023-10-30 RX ORDER — CEPHALEXIN 500 MG
1 CAPSULE ORAL
Qty: 0 | Refills: 0 | DISCHARGE
Start: 2023-10-30

## 2023-10-30 RX ORDER — CARBIDOPA AND LEVODOPA 25; 100 MG/1; MG/1
3 TABLET ORAL
Refills: 0 | DISCHARGE

## 2023-10-30 RX ORDER — CARBIDOPA AND LEVODOPA 25; 100 MG/1; MG/1
3 TABLET ORAL
Qty: 0 | Refills: 0 | DISCHARGE
Start: 2023-10-30

## 2023-10-30 RX ORDER — POLYETHYLENE GLYCOL 3350 17 G/17G
17 POWDER, FOR SOLUTION ORAL DAILY
Refills: 0 | Status: DISCONTINUED | OUTPATIENT
Start: 2023-10-30 | End: 2023-11-13

## 2023-10-30 RX ORDER — CARBIDOPA AND LEVODOPA 25; 100 MG/1; MG/1
1 TABLET ORAL ONCE
Refills: 0 | Status: DISCONTINUED | OUTPATIENT
Start: 2023-10-30 | End: 2023-10-30

## 2023-10-30 RX ORDER — GABAPENTIN 400 MG/1
1 CAPSULE ORAL
Refills: 0 | DISCHARGE

## 2023-10-30 RX ORDER — ROTIGOTINE 8 MG/24H
1 PATCH, EXTENDED RELEASE TRANSDERMAL EVERY 24 HOURS
Refills: 0 | Status: DISCONTINUED | OUTPATIENT
Start: 2023-10-30 | End: 2023-11-13

## 2023-10-30 RX ORDER — FENTANYL CITRATE 50 UG/ML
25 INJECTION INTRAVENOUS
Refills: 0 | Status: DISCONTINUED | OUTPATIENT
Start: 2023-10-30 | End: 2023-10-30

## 2023-10-30 RX ORDER — SENNA PLUS 8.6 MG/1
2 TABLET ORAL
Qty: 0 | Refills: 0 | DISCHARGE
Start: 2023-10-30

## 2023-10-30 RX ORDER — LIDOCAINE HCL 20 MG/ML
0.2 VIAL (ML) INJECTION ONCE
Refills: 0 | Status: COMPLETED | OUTPATIENT
Start: 2023-10-30 | End: 2023-10-30

## 2023-10-30 RX ORDER — GABAPENTIN 400 MG/1
1 CAPSULE ORAL
Qty: 0 | Refills: 0 | DISCHARGE
Start: 2023-10-30

## 2023-10-30 RX ORDER — ROTIGOTINE 8 MG/24H
1 PATCH, EXTENDED RELEASE TRANSDERMAL
Qty: 0 | Refills: 0 | DISCHARGE
Start: 2023-10-30

## 2023-10-30 RX ORDER — ERYTHROMYCIN BASE 5 MG/GRAM
1 OINTMENT (GRAM) OPHTHALMIC (EYE)
Qty: 0 | Refills: 0 | DISCHARGE
Start: 2023-10-30

## 2023-10-30 RX ORDER — ONDANSETRON 8 MG/1
4 TABLET, FILM COATED ORAL ONCE
Refills: 0 | Status: DISCONTINUED | OUTPATIENT
Start: 2023-10-30 | End: 2023-11-13

## 2023-10-30 RX ORDER — FAMOTIDINE 10 MG/ML
1 INJECTION INTRAVENOUS
Qty: 0 | Refills: 0 | DISCHARGE
Start: 2023-10-30

## 2023-10-30 RX ORDER — ACETAMINOPHEN 500 MG
650 TABLET ORAL EVERY 6 HOURS
Refills: 0 | Status: DISCONTINUED | OUTPATIENT
Start: 2023-10-30 | End: 2023-11-13

## 2023-10-30 RX ORDER — CLONAZEPAM 1 MG
1 TABLET ORAL
Refills: 0 | DISCHARGE

## 2023-10-30 RX ORDER — POLYETHYLENE GLYCOL 3350 17 G/17G
17 POWDER, FOR SOLUTION ORAL
Qty: 0 | Refills: 0 | DISCHARGE
Start: 2023-10-30

## 2023-10-30 RX ORDER — CEFAZOLIN SODIUM 1 G
2000 VIAL (EA) INJECTION ONCE
Refills: 0 | Status: COMPLETED | OUTPATIENT
Start: 2023-10-30 | End: 2023-10-30

## 2023-10-30 RX ORDER — CLONAZEPAM 1 MG
1 TABLET ORAL
Qty: 0 | Refills: 0 | DISCHARGE
Start: 2023-10-30

## 2023-10-30 RX ORDER — ESCITALOPRAM OXALATE 10 MG/1
1 TABLET, FILM COATED ORAL
Refills: 0 | DISCHARGE

## 2023-10-30 RX ORDER — CARBIDOPA AND LEVODOPA 25; 100 MG/1; MG/1
3 TABLET ORAL THREE TIMES A DAY
Refills: 0 | Status: DISCONTINUED | OUTPATIENT
Start: 2023-10-30 | End: 2023-10-30

## 2023-10-30 RX ORDER — GABAPENTIN 400 MG/1
200 CAPSULE ORAL ONCE
Refills: 0 | Status: COMPLETED | OUTPATIENT
Start: 2023-10-30 | End: 2023-10-30

## 2023-10-30 RX ORDER — OXYCODONE AND ACETAMINOPHEN 5; 325 MG/1; MG/1
1 TABLET ORAL
Qty: 12 | Refills: 0
Start: 2023-10-30 | End: 2023-11-01

## 2023-10-30 RX ORDER — ESCITALOPRAM OXALATE 10 MG/1
1 TABLET, FILM COATED ORAL
Qty: 0 | Refills: 0 | DISCHARGE
Start: 2023-10-30

## 2023-10-30 RX ORDER — SENNA PLUS 8.6 MG/1
2 TABLET ORAL DAILY
Refills: 0 | Status: DISCONTINUED | OUTPATIENT
Start: 2023-10-30 | End: 2023-11-13

## 2023-10-30 RX ORDER — GABAPENTIN 400 MG/1
100 CAPSULE ORAL DAILY
Refills: 0 | Status: DISCONTINUED | OUTPATIENT
Start: 2023-10-30 | End: 2023-11-13

## 2023-10-30 RX ORDER — CEPHALEXIN 500 MG
1 CAPSULE ORAL
Qty: 14 | Refills: 0
Start: 2023-10-30 | End: 2023-11-05

## 2023-10-30 RX ORDER — CARBIDOPA AND LEVODOPA 25; 100 MG/1; MG/1
3 TABLET ORAL
Refills: 0 | Status: DISCONTINUED | OUTPATIENT
Start: 2023-10-30 | End: 2023-11-13

## 2023-10-30 RX ORDER — LIDOCAINE 4 G/100G
1 CREAM TOPICAL
Qty: 0 | Refills: 0 | DISCHARGE
Start: 2023-10-30

## 2023-10-30 RX ORDER — ACETAMINOPHEN 500 MG
2 TABLET ORAL
Qty: 0 | Refills: 0 | DISCHARGE
Start: 2023-10-30

## 2023-10-30 RX ORDER — CLONAZEPAM 1 MG
0.5 TABLET ORAL
Refills: 0 | Status: DISCONTINUED | OUTPATIENT
Start: 2023-10-30 | End: 2023-10-30

## 2023-10-30 RX ORDER — CARBIDOPA AND LEVODOPA 25; 100 MG/1; MG/1
3 TABLET ORAL ONCE
Refills: 0 | Status: DISCONTINUED | OUTPATIENT
Start: 2023-10-30 | End: 2023-10-30

## 2023-10-30 RX ADMIN — CARBIDOPA AND LEVODOPA 3 CAPSULE(S): 25; 100 TABLET ORAL at 17:49

## 2023-10-30 RX ADMIN — FAMOTIDINE 20 MILLIGRAM(S): 10 INJECTION INTRAVENOUS at 05:00

## 2023-10-30 RX ADMIN — SODIUM CHLORIDE 100 MILLILITER(S): 9 INJECTION, SOLUTION INTRAVENOUS at 21:12

## 2023-10-30 RX ADMIN — Medication 0.5 MILLIGRAM(S): at 21:11

## 2023-10-30 RX ADMIN — ROTIGOTINE 1 PATCH: 8 PATCH, EXTENDED RELEASE TRANSDERMAL at 18:14

## 2023-10-30 RX ADMIN — Medication 0.5 MILLIGRAM(S): at 23:30

## 2023-10-30 RX ADMIN — Medication 1 APPLICATION(S): at 05:00

## 2023-10-30 RX ADMIN — Medication 0.5 MILLIGRAM(S): at 05:00

## 2023-10-30 RX ADMIN — Medication 500 MILLIGRAM(S): at 05:00

## 2023-10-30 RX ADMIN — SODIUM CHLORIDE 3 MILLILITER(S): 9 INJECTION INTRAMUSCULAR; INTRAVENOUS; SUBCUTANEOUS at 21:03

## 2023-10-30 RX ADMIN — CARBIDOPA AND LEVODOPA 3 CAPSULE(S): 25; 100 TABLET ORAL at 21:04

## 2023-10-30 RX ADMIN — Medication 0.5 MILLIGRAM(S): at 22:37

## 2023-10-30 RX ADMIN — SODIUM CHLORIDE 100 MILLILITER(S): 9 INJECTION, SOLUTION INTRAVENOUS at 18:01

## 2023-10-30 RX ADMIN — GABAPENTIN 200 MILLIGRAM(S): 400 CAPSULE ORAL at 21:04

## 2023-10-30 RX ADMIN — SODIUM CHLORIDE 3 MILLILITER(S): 9 INJECTION INTRAMUSCULAR; INTRAVENOUS; SUBCUTANEOUS at 18:14

## 2023-10-30 RX ADMIN — ROTIGOTINE 1 PATCH: 8 PATCH, EXTENDED RELEASE TRANSDERMAL at 17:49

## 2023-10-30 RX ADMIN — LIDOCAINE 2 PATCH: 4 CREAM TOPICAL at 00:20

## 2023-10-30 RX ADMIN — SODIUM CHLORIDE 100 MILLILITER(S): 9 INJECTION, SOLUTION INTRAVENOUS at 11:17

## 2023-10-30 NOTE — DISCHARGE NOTE PROVIDER - PROVIDER TOKENS
PROVIDER:[TOKEN:[[id number]],FOLLOWUP:[1 week]],PROVIDER:[TOKEN:[[id number]],FOLLOWUP:[1 week]]

## 2023-10-30 NOTE — DISCHARGE NOTE PROVIDER - NSDCMRMEDTOKEN_GEN_ALL_CORE_FT
acetaminophen 325 mg oral tablet: 2 tab(s) orally every 6 hours As needed Mild Pain (1 - 3)  bisacodyl 5 mg oral delayed release tablet: 1 tab(s) orally once a day As needed Constipation  carbidopa-levodopa 36.25 mg-145 mg oral capsule, extended release: 3 cap(s) orally 3 times a day  cephalexin 500 mg oral capsule: 1 cap(s) orally every 12 hours  clonazePAM 0.5 mg oral tablet, disintegratin tab(s) orally 2 times a day As needed Anxiety  erythromycin 0.5% ophthalmic ointment: 1 application to each affected eye 4 times a day  escitalopram 20 mg oral tablet: 1 tab(s) orally once a day  famotidine 20 mg oral tablet: 1 tab(s) orally every 12 hours  gabapentin 100 mg oral capsule: 1 cap(s) orally once a day  gabapentin 100 mg oral capsule: 2 cap(s) orally once a day (at bedtime)  lidocaine 4% topical film: Apply topically to affected area once a day  Percocet 5 mg-325 mg oral tablet: 1 tab(s) orally every 6 hours as needed for  severe pain MDD: 4  polyethylene glycol 3350 oral powder for reconstitution: 17 gram(s) orally 2 times a day  rotigotine 3 mg/24 hr transdermal film, extended release: 1 patch transdermal every 24 hours  senna leaf extract oral tablet: 2 tab(s) orally once a day (at bedtime)

## 2023-10-30 NOTE — CHART NOTE - NSCHARTNOTEFT_GEN_A_CORE
Attempted to see patient but he had already left with transportation to ambulatory surgery for stage 2 IPG placement for DBS. Patient was NPO at midnight and was accompanied by RN to procedure. Will reassess on patient's return

## 2023-10-30 NOTE — DISCHARGE NOTE PROVIDER - HOSPITAL COURSE
HPI:  63 year old male with PMH of severe Parkinson's disease, HTN, anxiety. He was diagnosed with Parkinson's disease in 2015 when he was having dragging of left leg, and difficulty moving. He was started on levodopa and it worked well for a few years. He now has B/L stiffness, and slowness, but no tremor. He is also experiencing neck discomfort and tilting of his neck forward. He has severe on/off motor fluctuations. He has sudden severe wearing offs of medications, and effects of medication lasts 1-2 hours then he has severe stiffness and slowness. He moves his neck better when he takes the medication. He experiencing freezing of gait, and he can't speak or think at those times. He walks with walker most of the time, and he can usually do his own bathing/dressing/toileting/feeding most days, but sometimes needs help. He is s/p Stage 1 Deep Brain Surgery Bilateral Implantation Into Globus Pallidus Internus With Leksell Frame on 10/25/23 and plan for Stage 2 on 10/30/23. He tolerated procedure well. He was evaluated for admission to acute inpatient rehab and admitted to [facility] on 10/27/23.     Patient overall reports feeling "good". Wife at bedside to supplement history. He denies headache, blurry vision, dizziness or lightheadedness. Last BM was on 10/24. No N/V/fever. Continent to bladder. Confirmed patient to be receiving Stage 2 on 10/30 and reminded patient who believed it was tomorrow. Patient tired appearing but following commands and conversational. Oriented to self, location, month, and present. Did respond 1984 when asked the year.    (27 Oct 2023 14:11)      Patient was evaluated by PM&R and therapy for gait/ADL impairments and recommended acute rehabilitation. Patient was medically optimized for discharge to [facility] on 10/27/23. Admitted with gait instability, ADL, and functional impairments.     You were transferred to [facility] for Stage 2 of DBS implantation. During your procedure you were noted to desaturate which worried the operative team. You will be staying at [facility] for ongoing acute care until stabilized.

## 2023-10-30 NOTE — PRE-ANESTHESIA EVALUATION ADULT - NSANTHPMHFT_GEN_ALL_CORE
Medical history and ROS reviewed  Severe Parkinsons with "freezing episodes"  No known cardiopulmonary disease, recent neg stress test  ROS otherwise neg

## 2023-10-30 NOTE — ASU DISCHARGE PLAN (ADULT/PEDIATRIC) - NS MD DC FALL RISK RISK
For information on Fall & Injury Prevention, visit: [email or web address] OR  [email or web address] OR  https://www.cdc.gov/steadi/patient.html

## 2023-10-30 NOTE — DISCHARGE NOTE PROVIDER - NSDCFUSCHEDAPPT_GEN_ALL_CORE_FT
Linda Gil  [facility]  NEUROLOGY [address]  Scheduled Appointment: 11/15/2023

## 2023-10-30 NOTE — PROGRESS NOTE ADULT - ASSESSMENT
Now s/p Stage 2 DBS.  -overnight PACU stay  -will return to  tomorrow  -continue home meds  -neurology consult for PD

## 2023-10-30 NOTE — DISCHARGE NOTE PROVIDER - NSDCFUADDAPPT_GEN_ALL_CORE_FT
Please follow up with your PCP as soon as possible.    If you are in need of a PCP or a specialist in your area: contact the [facility] Physician Referral Service at [phone].

## 2023-10-30 NOTE — DISCHARGE NOTE NURSING/CASE MANAGEMENT/SOCIAL WORK - NSDCFUADDAPPT_GEN_ALL_CORE_FT
Please follow up with your PCP as soon as possible.    If you are in need of a PCP or a specialist in your area: contact the [facility] Physician Referral Service at [phone].

## 2023-10-30 NOTE — ASU DISCHARGE PLAN (ADULT/PEDIATRIC) - CARE PROVIDER_API CALL
Addison Garcia  Neurosurgery  [address], [address]  [place] [address]  Phone: [phone]  Fax: [phone]  Established Patient  Follow Up Time:

## 2023-10-30 NOTE — DISCHARGE NOTE NURSING/CASE MANAGEMENT/SOCIAL WORK - NSDCPEFALRISK_GEN_ALL_CORE
For information on Fall & Injury Prevention, visit: [email or web address] OR  [email or web address] OR  https://www.cdc.gov/steadi/patient.html

## 2023-10-30 NOTE — ASU DISCHARGE PLAN (ADULT/PEDIATRIC) - ASU DC SPECIAL INSTRUCTIONSFT
Do not bathe for 2 days. After 2 days can shower. Do not scrub incision, allow water to wash over the incision. Do not submerge in water for 14 days. Do not remove dressings; they will fall off. Call the office with any redness, burning, drainage, wound opening, worsening pain, redness, fevers. Take OTC tylenol as needed for pain and your own pain medications. Follow-up in the office as scheduled.

## 2023-10-30 NOTE — DISCHARGE NOTE PROVIDER - CARE PROVIDER_API CALL
Linda Gil  Neurology  [address], [address]  [place] [address]  Phone: [phone]  Fax: [phone]  Follow Up Time: 1 week    Addison Garcia  Neurosurgery  [address], [address]  [place] [address]  Phone: [phone]  Fax: [phone]  Follow Up Time: 1 week

## 2023-10-30 NOTE — BRIEF OPERATIVE NOTE - NSICDXBRIEFPROCEDURE_GEN_ALL_CORE_FT
PROCEDURES:  Stage 1 or 2 stereotactic implantation of deep brain stimulator 30-Oct-2023 11:44:37  Milly Najera

## 2023-10-30 NOTE — PROGRESS NOTE ADULT - SUBJECTIVE AND OBJECTIVE BOX
Patient seen and examined at bedside.    --Anticoagulation--    T(C): 36.8 (10-30-23 @ 15:40), Max: 36.8 (10-30-23 @ 08:32)  HR: 79 (10-30-23 @ 19:00) (65 - 94)  BP: 138/92 (10-30-23 @ 19:00) (130/80 - 151/88)  RR: 17 (10-30-23 @ 19:00) (14 - 19)  SpO2: 99% (10-30-23 @ 19:00) (95% - 100%)  Wt(kg): --    Exam: a little confused, Ox2, FC, COLON spon, SILT

## 2023-10-30 NOTE — DISCHARGE NOTE PROVIDER - NSDCCPCAREPLAN_GEN_ALL_CORE_FT
PRINCIPAL DISCHARGE DIAGNOSIS  Diagnosis: S/P deep brain stimulator placement  Assessment and Plan of Treatment: You recently underwent DBS placement at [facility]. You were sent to [facility] for optimization of functioning. You underwent Stage 2 of DBS implantation on 10/30, but requred more acute hospital care, and are being admitted to [facility] until further notice.      SECONDARY DISCHARGE DIAGNOSES  Diagnosis: Parkinsons disease  Assessment and Plan of Treatment: Please continue to take your medications as prescribed.

## 2023-10-31 ENCOUNTER — NON-APPOINTMENT (OUTPATIENT)
Age: 63
End: 2023-10-31

## 2023-10-31 ENCOUNTER — INPATIENT (INPATIENT)
Facility: HOSPITAL | Age: 63
LOS: 13 days | Discharge: HOME HEALTH | DRG: 57 | End: 2023-11-14
Attending: PSYCHIATRY & NEUROLOGY | Admitting: PSYCHIATRY & NEUROLOGY
Payer: MEDICARE

## 2023-10-31 ENCOUNTER — TRANSCRIPTION ENCOUNTER (OUTPATIENT)
Age: 63
End: 2023-10-31

## 2023-10-31 VITALS
SYSTOLIC BLOOD PRESSURE: 125 MMHG | TEMPERATURE: 98 F | OXYGEN SATURATION: 99 % | RESPIRATION RATE: 16 BRPM | HEART RATE: 66 BPM | DIASTOLIC BLOOD PRESSURE: 71 MMHG

## 2023-10-31 VITALS
HEART RATE: 65 BPM | OXYGEN SATURATION: 95 % | DIASTOLIC BLOOD PRESSURE: 81 MMHG | HEIGHT: 74 IN | RESPIRATION RATE: 16 BRPM | SYSTOLIC BLOOD PRESSURE: 147 MMHG | WEIGHT: 217.6 LBS | TEMPERATURE: 98 F

## 2023-10-31 DIAGNOSIS — G20.A1 PARKINSON'S DISEASE WITHOUT DYSKINESIA, WITHOUT MENTION OF FLUCTUATIONS: ICD-10-CM

## 2023-10-31 DIAGNOSIS — Z98.890 OTHER SPECIFIED POSTPROCEDURAL STATES: Chronic | ICD-10-CM

## 2023-10-31 PROCEDURE — C1787: CPT

## 2023-10-31 PROCEDURE — 61886 IMPLANT NEUROSTIM ARRAYS: CPT

## 2023-10-31 PROCEDURE — C1767: CPT

## 2023-10-31 PROCEDURE — 99223 1ST HOSP IP/OBS HIGH 75: CPT

## 2023-10-31 PROCEDURE — C1883: CPT

## 2023-10-31 PROCEDURE — C1889: CPT

## 2023-10-31 PROCEDURE — 71045 X-RAY EXAM CHEST 1 VIEW: CPT

## 2023-10-31 PROCEDURE — 97161 PT EVAL LOW COMPLEX 20 MIN: CPT

## 2023-10-31 PROCEDURE — 97166 OT EVAL MOD COMPLEX 45 MIN: CPT

## 2023-10-31 PROCEDURE — C9399: CPT

## 2023-10-31 RX ORDER — CEPHALEXIN 500 MG
1 CAPSULE ORAL
Qty: 0 | Refills: 0 | DISCHARGE

## 2023-10-31 RX ORDER — FAMOTIDINE 10 MG/ML
20 INJECTION INTRAVENOUS DAILY
Refills: 0 | Status: DISCONTINUED | OUTPATIENT
Start: 2023-11-01 | End: 2023-11-14

## 2023-10-31 RX ORDER — ESCITALOPRAM OXALATE 10 MG/1
20 TABLET, FILM COATED ORAL DAILY
Refills: 0 | Status: DISCONTINUED | OUTPATIENT
Start: 2023-11-01 | End: 2023-11-14

## 2023-10-31 RX ORDER — ERYTHROMYCIN BASE 5 MG/GRAM
1 OINTMENT (GRAM) OPHTHALMIC (EYE)
Refills: 0 | Status: DISCONTINUED | OUTPATIENT
Start: 2023-10-31 | End: 2023-11-14

## 2023-10-31 RX ORDER — ACETAMINOPHEN 500 MG
650 TABLET ORAL EVERY 6 HOURS
Refills: 0 | Status: DISCONTINUED | OUTPATIENT
Start: 2023-10-31 | End: 2023-11-14

## 2023-10-31 RX ORDER — CLONAZEPAM 1 MG
0.5 TABLET ORAL
Refills: 0 | Status: DISCONTINUED | OUTPATIENT
Start: 2023-10-31 | End: 2023-11-05

## 2023-10-31 RX ORDER — SENNA PLUS 8.6 MG/1
2 TABLET ORAL AT BEDTIME
Refills: 0 | Status: DISCONTINUED | OUTPATIENT
Start: 2023-10-31 | End: 2023-11-14

## 2023-10-31 RX ORDER — GABAPENTIN 400 MG/1
100 CAPSULE ORAL AT BEDTIME
Refills: 0 | Status: DISCONTINUED | OUTPATIENT
Start: 2023-10-31 | End: 2023-11-14

## 2023-10-31 RX ORDER — LIDOCAINE 4 G/100G
2 CREAM TOPICAL
Refills: 0 | Status: DISCONTINUED | OUTPATIENT
Start: 2023-11-01 | End: 2023-11-14

## 2023-10-31 RX ORDER — CLONAZEPAM 1 MG
1 TABLET ORAL
Qty: 180 | Refills: 0
Start: 2023-10-31 | End: 2024-01-28

## 2023-10-31 RX ORDER — CEPHALEXIN 500 MG
500 CAPSULE ORAL EVERY 12 HOURS
Refills: 0 | Status: DISCONTINUED | OUTPATIENT
Start: 2023-10-31 | End: 2023-11-07

## 2023-10-31 RX ORDER — ACETAMINOPHEN 500 MG
1000 TABLET ORAL ONCE
Refills: 0 | Status: COMPLETED | OUTPATIENT
Start: 2023-10-31 | End: 2023-10-31

## 2023-10-31 RX ORDER — ROTIGOTINE 8 MG/24H
1 PATCH, EXTENDED RELEASE TRANSDERMAL
Refills: 0 | Status: DISCONTINUED | OUTPATIENT
Start: 2023-11-01 | End: 2023-11-06

## 2023-10-31 RX ORDER — POLYETHYLENE GLYCOL 3350 17 G/17G
17 POWDER, FOR SOLUTION ORAL
Refills: 0 | Status: DISCONTINUED | OUTPATIENT
Start: 2023-10-31 | End: 2023-11-14

## 2023-10-31 RX ORDER — CARBIDOPA AND LEVODOPA 25; 100 MG/1; MG/1
3 TABLET ORAL
Refills: 0 | Status: DISCONTINUED | OUTPATIENT
Start: 2023-10-31 | End: 2023-11-14

## 2023-10-31 RX ORDER — GABAPENTIN 400 MG/1
100 CAPSULE ORAL DAILY
Refills: 0 | Status: DISCONTINUED | OUTPATIENT
Start: 2023-11-01 | End: 2023-11-14

## 2023-10-31 RX ORDER — CEPHALEXIN 500 MG
1 CAPSULE ORAL
Qty: 14 | Refills: 0
Start: 2023-10-31 | End: 2023-11-06

## 2023-10-31 RX ADMIN — ROTIGOTINE 1 PATCH: 8 PATCH, EXTENDED RELEASE TRANSDERMAL at 16:35

## 2023-10-31 RX ADMIN — SODIUM CHLORIDE 3 MILLILITER(S): 9 INJECTION INTRAMUSCULAR; INTRAVENOUS; SUBCUTANEOUS at 06:05

## 2023-10-31 RX ADMIN — CARBIDOPA AND LEVODOPA 3 CAPSULE(S): 25; 100 TABLET ORAL at 12:11

## 2023-10-31 RX ADMIN — CARBIDOPA AND LEVODOPA 3 CAPSULE(S): 25; 100 TABLET ORAL at 16:40

## 2023-10-31 RX ADMIN — SODIUM CHLORIDE 3 MILLILITER(S): 9 INJECTION INTRAMUSCULAR; INTRAVENOUS; SUBCUTANEOUS at 14:30

## 2023-10-31 RX ADMIN — SODIUM CHLORIDE 100 MILLILITER(S): 9 INJECTION, SOLUTION INTRAVENOUS at 00:00

## 2023-10-31 RX ADMIN — Medication 400 MILLIGRAM(S): at 01:30

## 2023-10-31 RX ADMIN — CARBIDOPA AND LEVODOPA 3 CAPSULE(S): 25; 100 TABLET ORAL at 20:20

## 2023-10-31 RX ADMIN — Medication 500 MILLIGRAM(S): at 19:31

## 2023-10-31 RX ADMIN — POLYETHYLENE GLYCOL 3350 17 GRAM(S): 17 POWDER, FOR SOLUTION ORAL at 12:21

## 2023-10-31 RX ADMIN — GABAPENTIN 100 MILLIGRAM(S): 400 CAPSULE ORAL at 10:34

## 2023-10-31 RX ADMIN — Medication 0.5 MILLIGRAM(S): at 22:59

## 2023-10-31 RX ADMIN — CARBIDOPA AND LEVODOPA 3 CAPSULE(S): 25; 100 TABLET ORAL at 07:08

## 2023-10-31 RX ADMIN — GABAPENTIN 100 MILLIGRAM(S): 400 CAPSULE ORAL at 20:54

## 2023-10-31 RX ADMIN — Medication 1 APPLICATION(S): at 19:31

## 2023-10-31 RX ADMIN — ROTIGOTINE 1 PATCH: 8 PATCH, EXTENDED RELEASE TRANSDERMAL at 09:05

## 2023-10-31 NOTE — DISCHARGE NOTE PROVIDER - CARE PROVIDER_API CALL
Addison Garcia  Neurosurgery  [address], [address]  [place] [address]  Phone: [phone]  Fax: [phone]  Follow Up Time:

## 2023-10-31 NOTE — H&P ADULT - ASSESSMENT
Patient is a 62yo M with history of severe Parkinson's disease (dx in 2015), HTN, anxiety, who presented to [facility] 10/25 for DBS, now s/p Stage 1 and Stage 2 DBS. Hospital course complicated by brochospasm requiring overnight observation postoperatively, also now with dysphagia. Admitted to [place] acute inpatient rehab on 10/31 for ADL, gait, and functional impairments.     #Parkinson's Disease now with gait Instability, ADL impairments and Functional impairments  - Start Comprehensive Rehab Program of PT/OT/SLP    ------------------------------------------------------  Parkinson's related motor symptoms    #Rigidity/Bradykinesia/wearing off  -S/p Stage 1 Deep Brain Surgery Bilateral Implantation Into Globus Pallidus Internus With Leksell Frame on 10/25/23    -S/p Stage 2 on 10/30/23 - monitored overnight for hypoxia 2/2 bronchospasm  -Continue Keflex 500mg BID for post-op ppx x7 days  -Continue Rytary 145mg 3 tabs at 8a, 12pm, 4pm and 8pm - non-formulary, need pharmacy to order  -Continue Neupro patch 3mg /24hours daily- non-formulary, need pharmacy to order  -Movement disorders following    --------------------------------------------------------  Parkinson's related non-motor symptoms    #Mood/anxiety  -Continue lexapro 20mg daily  -Continue Clonazepam 0.5mg BID PRN  -Neuropsych to follow    #Orthostatic hypotension  -Monitor OH vital signs    #Pain control  - Tylenol PRN  - Lidocaine patches to b/l knees  -Continue gabapentin 100mg in am and 200mg at bedtime     #GI/Bowel Management/Constipation  - Continue Senna at bedtime   - Miralax BID  - Bisacodyl PRN  -Pepcid 20mg daily    #Bladder management  - Continue to monitor PVR q 8 hours (SC if > 400)  -Monitor UO    ----------------------------------------------------------  Concurrent medical problems      #DVT Prophylaxis  - TEDs   - pharmacologic ppx contraindicated given recent surgery    #Skin:  -******____surgical sites on top of skull. Clean dry and intact. Staples present. No fluctuance, erythema, drainage, or even oozing at time of exam.    FEN   - Diet - Pureed Diet, Mildly Thick Liquids  [CCHO, DASH/TLC]    - Dysphagia  SLP - evaluation and treatment    Precautions / PROPHYLAXIS:   - Falls  - ortho: Weight bearing status: WBAT   - Lungs: Aspiration, Incentive Spirometer   - Pressure injury/Skin: Turn Q2hrs while in bed, OOB to Chair, PT/OT        MEDICAL PROGNOSIS: GOOD              REHAB POTENTIAL: GOOD              ESTIMATED DISPOSITION: HOME WITH HOME CARE              ELOS: 10-14 Days   EXPECTED THERAPY:     P.T. 1hr/day       O.T. 1hr/day      S.L.P. 1hr/day       P&O Unnecessary       EXP FREQUENCY: 5 days per 7 day period     PRESCREEN COMPARISON:   I have reviewed the prescreen information and I have found no relevant changes between the preadmission screening and my post admission evaluation     RATIONALE FOR INPATIENT ADMISSION - Patient demonstrates the following: (check all that apply)  [X] Medically appropriate for rehabilitation admission  [X] Has attainable rehab goals with an appropriate initial discharge plan  [X] Has rehabilitation potential (expected to make a significant improvement within a reasonable period of time)   [X] Requires close medical management by a rehab physician, rehab nursing care, Hospitalist and comprehensive interdisciplinary team (including PT, OT, & or SLP, Prosthetics and Orthotics)   Patient is a 64yo M with history of severe Parkinson's disease (dx in 2015), HTN, anxiety, who presented to [facility] 10/25 for DBS, now s/p Stage 1 and Stage 2 DBS. Hospital course complicated by brochospasm requiring overnight observation postoperatively, also now with dysphagia. Admitted to [place] acute inpatient rehab on 10/31 for ADL, gait, and functional impairments.     #Parkinson's Disease now with gait Instability, ADL impairments and Functional impairments  - Start Comprehensive Rehab Program of PT/OT/SLP    ------------------------------------------------------  Parkinson's related motor symptoms    #Rigidity/Bradykinesia/wearing off  -S/p Stage 1 Deep Brain Surgery Bilateral Implantation Into Globus Pallidus Internus With Leksell Frame on 10/25/23    -S/p Stage 2 on 10/30/23 - monitored overnight for hypoxia 2/2 bronchospasm  -Continue Keflex 500mg BID for post-op ppx x7 days  -Continue Rytary 145mg 3 tabs at 8a, 12pm, 4pm and 8pm  -Continue Neupro patch 3mg /24hours daily  -Movement disorders following    --------------------------------------------------------  Parkinson's related non-motor symptoms    #Mood/anxiety  -Continue lexapro 20mg daily  -Continue Clonazepam 0.5mg BID PRN  -Neuropsych to follow    #Orthostatic hypotension  -Monitor OH vital signs    #Pain control  - Tylenol PRN  - Lidocaine patches to b/l knees  -Continue gabapentin 100mg in am and 200mg at bedtime     #GI/Bowel Management/Constipation  - Continue Senna at bedtime   - Miralax BID  - Bisacodyl PRN  -Pepcid 20mg daily    #Bladder management  - Continue to monitor PVR q 8 hours (SC if > 400)  -Monitor UO    ----------------------------------------------------------  Concurrent medical problems      #DVT Prophylaxis  - TEDs   - pharmacologic ppx contraindicated given recent surgery    #Skin:  -3 surgical sites on top of skull. Clean dry and intact. Staples present. No fluctuance, erythema, drainage, or oozing at time of exam.    FEN   - Diet - Pureed Diet, Mildly Thick Liquids  [CCHO, DASH/TLC]    - Dysphagia  SLP - evaluation and treatment    Precautions / PROPHYLAXIS:   - Falls  - ortho: Weight bearing status: WBAT   - Lungs: Aspiration, Incentive Spirometer   - Pressure injury/Skin: Turn Q2hrs while in bed, OOB to Chair, PT/OT        MEDICAL PROGNOSIS: GOOD              REHAB POTENTIAL: GOOD              ESTIMATED DISPOSITION: HOME WITH HOME CARE              ELOS: 10-14 Days   EXPECTED THERAPY:     P.T. 1hr/day       O.T. 1hr/day      S.L.P. 1hr/day       P&O Unnecessary       EXP FREQUENCY: 5 days per 7 day period     PRESCREEN COMPARISON:   I have reviewed the prescreen information and I have found no relevant changes between the preadmission screening and my post admission evaluation     RATIONALE FOR INPATIENT ADMISSION - Patient demonstrates the following: (check all that apply)  [X] Medically appropriate for rehabilitation admission  [X] Has attainable rehab goals with an appropriate initial discharge plan  [X] Has rehabilitation potential (expected to make a significant improvement within a reasonable period of time)   [X] Requires close medical management by a rehab physician, rehab nursing care, Hospitalist and comprehensive interdisciplinary team (including PT, OT, & or SLP, Prosthetics and Orthotics)   Patient is a 62yo M with history of severe Parkinson's disease (dx in 2015), HTN, anxiety, who presented to [facility] 10/25 for DBS, now s/p Stage 1 and Stage 2 DBS. Hospital course complicated by brochospasm requiring overnight observation postoperatively, also now with dysphagia. Admitted to [place] acute inpatient rehab on 10/31 for ADL, gait, and functional impairments.     #Parkinson's Disease now with gait Instability, ADL impairments and Functional impairments  - Start Comprehensive Rehab Program of PT/OT/SLP    ------------------------------------------------------  Parkinson's related motor symptoms    #Rigidity/Bradykinesia/wearing off  -S/p Stage 1 Deep Brain Surgery Bilateral Implantation Into Globus Pallidus Internus With Leksell Frame on 10/25/23    -S/p Stage 2 on 10/30/23 - monitored overnight for hypoxia 2/2 bronchospasm  -Continue Keflex 500mg BID for post-op ppx x7 days  -Continue Rytary 145mg 3 tabs at 8a, 12pm, 4pm and 8pm  -Continue Neupro patch 3mg /24hours daily  -Movement disorders following    --------------------------------------------------------  Parkinson's related non-motor symptoms    #Mood/anxiety  -Continue lexapro 20mg daily  -Continue Clonazepam 0.5mg BID PRN  -Neuropsych to follow    #Orthostatic hypotension  -Monitor OH vital signs    #Pain control  - Tylenol PRN  - Lidocaine patches to b/l knees  -Continue gabapentin 100mg in am and 200mg at bedtime     #GI/Bowel Management/Constipation  - Continue Senna at bedtime   - Miralax BID  - Bisacodyl PRN  -Pepcid 20mg daily    #Bladder management  - Continue to monitor PVR q 8 hours (SC if > 400)  -Monitor UO    ----------------------------------------------------------  Concurrent medical problems      #DVT Prophylaxis  - TEDs   - pharmacologic ppx contraindicated given recent surgery    #Skin:  -3 surgical sites on top of skull. Clean dry and intact. Staples present. No fluctuance, erythema, drainage, or oozing at time of exam.  -Do not remove dressings   -Can shower 2 day post-op but do not scrub incision    FEN   - Diet - Pureed Diet, Mildly Thick Liquids  [CCHO, DASH/TLC]    - Dysphagia  SLP - evaluation and treatment    Precautions / PROPHYLAXIS:   - Falls  - ortho: Weight bearing status: WBAT   - Lungs: Aspiration, Incentive Spirometer   - Pressure injury/Skin: Turn Q2hrs while in bed, OOB to Chair, PT/OT        MEDICAL PROGNOSIS: GOOD              REHAB POTENTIAL: GOOD              ESTIMATED DISPOSITION: HOME WITH HOME CARE              ELOS: 10-14 Days   EXPECTED THERAPY:     P.T. 1hr/day       O.T. 1hr/day      S.L.P. 1hr/day       P&O Unnecessary       EXP FREQUENCY: 5 days per 7 day period     PRESCREEN COMPARISON:   I have reviewed the prescreen information and I have found no relevant changes between the preadmission screening and my post admission evaluation     RATIONALE FOR INPATIENT ADMISSION - Patient demonstrates the following: (check all that apply)  [X] Medically appropriate for rehabilitation admission  [X] Has attainable rehab goals with an appropriate initial discharge plan  [X] Has rehabilitation potential (expected to make a significant improvement within a reasonable period of time)   [X] Requires close medical management by a rehab physician, rehab nursing care, Hospitalist and comprehensive interdisciplinary team (including PT, OT, & or SLP, Prosthetics and Orthotics)

## 2023-10-31 NOTE — PHYSICAL THERAPY INITIAL EVALUATION ADULT - PERTINENT HX OF CURRENT PROBLEM, REHAB EVAL
63 year old male with PMH of severe Parkinson's disease, HTN, anxiety. He was diagnosed with Parkinson's disease in 2015 when he was having dragging of left leg, and difficulty moving. He was started on levodopa and it worked well for a few years. He now has B/L stiffness, and slowness, but no tremor. He is also experiencing neck discomfort and tilting of his neck forward. He has severe on/off motor fluctuations. He has sudden severe wearing offs of medications, and effects of medication lasts 1-2 hours then he has severe stiffness and slowness. He moves his neck better when he takes the medication. He experiencing freezing of gait, and he can't speak or think at those times. He walks with walker most of the time, and he can usually do his own bathing/dressing/toileting/feeding most days, but sometimes needs help. He is s/p Stage 1 Deep Brain Surgery Bilateral Implantation Into Globus Pallidus Internus With Leksell Frame on 10/25/23 and plan for Stage 2 on 10/30/23. He tolerated procedure well. He was evaluated for admission to acute inpatient rehab and admitted to [facility] on 10/27/23.     Now s/p Stage 2 stereotactic implantation of deep brain stimulator on 10/30.

## 2023-10-31 NOTE — PROGRESS NOTE ADULT - SUBJECTIVE AND OBJECTIVE BOX
63 year old male with PMH of severe Parkinson's disease, HTN, anxiety. He was diagnosed with Parkinson's disease in 2015 when he was having dragging of left leg, and difficulty moving. He was started on levodopa and it worked well for a few years. He now has B/L stiffness, and slowness, but no tremor. He is also experiencing neck discomfort and tilting of his neck forward. He has severe on/off motor fluctuations. He has sudden severe wearing offs of medications, and effects of medication lasts 1-2 hours then he has severe stiffness and slowness. He moves his neck better when he takes the medication. He experiencing freezing of gait, and he can't speak or think at those times. He walks with walker most of the time, and he can usually do his own bathing/dressing/toileting/feeding most days, but sometimes needs help. He is s/p Stage 1 Deep Brain Surgery Bilateral Implantation Into Globus Pallidus Internus With Leksell Frame on 10/25/23 and plan for Stage 2 on 10/30/23. He tolerated procedure well. He was evaluated for admission to acute inpatient rehab and admitted to [facility] on 10/27/23.     Stage 1 or 2 stereotactic implantation of deep brain stimulator    Overnight event: no acute event    Vital Signs Last 24 Hrs  T(C): 36.7 (31 Oct 2023 04:00), Max: 37.3 (30 Oct 2023 22:00)  T(F): 98.1 (31 Oct 2023 04:00), Max: 99.2 (30 Oct 2023 22:00)  HR: 81 (31 Oct 2023 06:00) (66 - 94)  BP: 157/90 (31 Oct 2023 06:00) (130/80 - 166/91)  BP(mean): 118 (31 Oct 2023 06:00) (92 - 122)  RR: 16 (31 Oct 2023 06:00) (14 - 19)  SpO2: 99% (31 Oct 2023 06:00) (95% - 100%)    Parameters below as of 31 Oct 2023 06:00  Patient On (Oxygen Delivery Method): room air                              12.3   8.65  )-----------( 312      ( 30 Oct 2023 06:03 )             39.0    10-30    139  |  102  |  15  ----------------------------<  103<H>  3.7   |  31  |  0.72    Ca    8.7      30 Oct 2023 06:03    TPro  6.3  /  Alb  2.8<L>  /  TBili  0.6  /  DBili  x   /  AST  10  /  ALT  8<L>  /  AlkPhos  64  10-30     Stroke Core Measures      DRAIN OUTPUT:     NEUROIMAGING:     PHYSICAL EXAM:    General: No Acute Distress     Neurological: Awake, alert oriented to person, place and time, Following Commands, PERRL, EOMI, Face Symmetrical, Speech Fluent, Moving all extremities, Muscle Strength normal in all four extremities, No Drift, Sensation to Light Touch Intact    Pulmonary: Clear to Auscultation, No Rales, No Rhonchi, No Wheezes     Cardiovascular: S1, S2, Regular Rate and Rhythm     Gastrointestinal: Soft, Nontender, Nondistended     Incision:     MEDICATIONS:   Antibiotics:    Neuro:  acetaminophen     Tablet .. 650 milliGRAM(s) Oral every 6 hours PRN Temp greater or equal to 38C (100.4F), Mild Pain (1 - 3)  carbidopa 36.25 mG/levodopa 145 mG ER 3 Capsule(s) Oral four times a day  clonazePAM  Tablet 0.5 milliGRAM(s) Oral two times a day PRN anxiety  fentaNYL    Injectable 25 MICROGram(s) IV Push every 5 minutes PRN Moderate Pain (4 - 6)  gabapentin 100 milliGRAM(s) Oral daily  ondansetron Injectable 4 milliGRAM(s) IV Push once PRN Nausea and/or Vomiting  oxycodone    5 mG/acetaminophen 325 mG 1 Tablet(s) Oral every 6 hours PRN Moderate Pain (4 - 6)  rotigotine patch 3 mG/24 Hr(s) 1 Patch Transdermal every 24 hours    Anticoagulation:    Cardiology:    Endo:     Pulm:    GI/:  polyethylene glycol 3350 17 Gram(s) Oral daily  senna 2 Tablet(s) Oral daily PRN    Other:  chlorhexidine 2% Cloths 1 Application(s) Topical once  lactated ringers. 1000 milliLiter(s) IV Continuous <Continuous>  sodium chloride 0.9% lock flush 3 milliLiter(s) IV Push every 8 hours  polyethylene glycol 3350 17 Gram(s) Oral daily  senna 2 Tablet(s) Oral daily PRN         63 year old male with PMH of severe Parkinson's disease, HTN, anxiety. He was diagnosed with Parkinson's disease in 2015 when he was having dragging of left leg, and difficulty moving. He was started on levodopa and it worked well for a few years. He now has B/L stiffness, and slowness, but no tremor. He is also experiencing neck discomfort and tilting of his neck forward. He has severe on/off motor fluctuations. He has sudden severe wearing offs of medications, and effects of medication lasts 1-2 hours then he has severe stiffness and slowness. He moves his neck better when he takes the medication. He experiencing freezing of gait, and he can't speak or think at those times. He walks with walker most of the time, and he can usually do his own bathing/dressing/toileting/feeding most days, but sometimes needs help. He is s/p Stage 1 Deep Brain Surgery Bilateral Implantation Into Globus Pallidus Internus With Leksell Frame on 10/25/23 and plan for Stage 2 on 10/30/23. He tolerated procedure well. He was evaluated for admission to acute inpatient rehab and admitted to [facility] on 10/27/23.     Stage 2 stereotactic implantation of deep brain stimulator    Overnight event: no acute event    Vital Signs Last 24 Hrs  T(C): 36.7 (31 Oct 2023 04:00), Max: 37.3 (30 Oct 2023 22:00)  T(F): 98.1 (31 Oct 2023 04:00), Max: 99.2 (30 Oct 2023 22:00)  HR: 81 (31 Oct 2023 06:00) (66 - 94)  BP: 157/90 (31 Oct 2023 06:00) (130/80 - 166/91)  BP(mean): 118 (31 Oct 2023 06:00) (92 - 122)  RR: 16 (31 Oct 2023 06:00) (14 - 19)  SpO2: 99% (31 Oct 2023 06:00) (95% - 100%)    Parameters below as of 31 Oct 2023 06:00  Patient On (Oxygen Delivery Method): room air                              12.3   8.65  )-----------( 312      ( 30 Oct 2023 06:03 )             39.0    10-30    139  |  102  |  15  ----------------------------<  103<H>  3.7   |  31  |  0.72    Ca    8.7      30 Oct 2023 06:03    TPro  6.3  /  Alb  2.8<L>  /  TBili  0.6  /  DBili  x   /  AST  10  /  ALT  8<L>  /  AlkPhos  64  10-30     Stroke Core Measures      DRAIN OUTPUT:     NEUROIMAGING:     PHYSICAL EXAM:    General: No Acute Distress     Neurological: Awake, alert oriented to person, place with choices, Following Commands, PERRL, EOMI, Speech dysarthric, Moving all extremities,  Sensation to Light Touch Intact    Pulmonary: Clear to Auscultation, No Rales, No Rhonchi, No Wheezes     Cardiovascular: S1, S2, Regular Rate and Rhythm     Gastrointestinal: Soft, Nontender, Nondistended     Incision: dressing with minimal bloody stain    MEDICATIONS:   Antibiotics:    Neuro:  acetaminophen     Tablet .. 650 milliGRAM(s) Oral every 6 hours PRN Temp greater or equal to 38C (100.4F), Mild Pain (1 - 3)  carbidopa 36.25 mG/levodopa 145 mG ER 3 Capsule(s) Oral four times a day  clonazePAM  Tablet 0.5 milliGRAM(s) Oral two times a day PRN anxiety  fentaNYL    Injectable 25 MICROGram(s) IV Push every 5 minutes PRN Moderate Pain (4 - 6)  gabapentin 100 milliGRAM(s) Oral daily  ondansetron Injectable 4 milliGRAM(s) IV Push once PRN Nausea and/or Vomiting  oxycodone    5 mG/acetaminophen 325 mG 1 Tablet(s) Oral every 6 hours PRN Moderate Pain (4 - 6)  rotigotine patch 3 mG/24 Hr(s) 1 Patch Transdermal every 24 hours    Anticoagulation:    Cardiology:    Endo:     Pulm:    GI/:  polyethylene glycol 3350 17 Gram(s) Oral daily  senna 2 Tablet(s) Oral daily PRN    Other:  chlorhexidine 2% Cloths 1 Application(s) Topical once  lactated ringers. 1000 milliLiter(s) IV Continuous <Continuous>  sodium chloride 0.9% lock flush 3 milliLiter(s) IV Push every 8 hours  polyethylene glycol 3350 17 Gram(s) Oral daily  senna 2 Tablet(s) Oral daily PRN

## 2023-10-31 NOTE — OCCUPATIONAL THERAPY INITIAL EVALUATION ADULT - PERTINENT HX OF CURRENT PROBLEM, REHAB EVAL
63 year old male with PMH of severe Parkinson's disease, HTN, anxiety. He was diagnosed with Parkinson's disease in 2015 when he was having dragging of left leg, and difficulty moving. He was started on levodopa and it worked well for a few years. He now has B/L stiffness, and slowness, but no tremor. He is also experiencing neck discomfort and tilting of his neck forward. He has severe on/off motor fluctuations. He has sudden severe wearing offs of medications, and effects of medication lasts 1-2 hours then he has severe stiffness and slowness. He moves his neck better when he takes the medication. He experiencing freezing of gait, and he can't speak or think at those times. He walks with walker most of the time, and he can usually do his own bathing/dressing/toileting/feeding most days, but sometimes needs help. He is s/p Stage 1 Deep Brain Surgery Bilateral Implantation Into Globus Pallidus Internus With Leksell Frame on 10/25/23 and plan for Stage 2 on 10/30/23. He tolerated procedure well. He was evaluated for admission to acute inpatient rehab and admitted to [facility] on 10/27/23. now s/p Stage 2 DBS 10/30

## 2023-10-31 NOTE — PHYSICAL THERAPY INITIAL EVALUATION ADULT - PLANNED THERAPY INTERVENTIONS, PT EVAL
balance training/bed mobility training/gait training/strengthening/transfer training enteral or parenteral nutrition prior to admission

## 2023-10-31 NOTE — OCCUPATIONAL THERAPY INITIAL EVALUATION ADULT - ADDITIONAL COMMENTS
pt's sister reports pt lives in co-op with spouse, elevator entrance, no stairs, walk-in shower with tub bench. Prior to admission assist with ADLs/ambulating with rollator.

## 2023-10-31 NOTE — H&P ADULT - NSHPSOCIALHISTORY_GEN_ALL_CORE
No  SOCIAL HISTORY  Smoking - Denied  EtOH - Denied   Drugs - Denied    FUNCTIONAL HISTORY  Lives with wife  used walker, needed some assist with ADLs     FUNCTIONAL PROGRESS  10/26 PT  bed mobility CG  transfers min assist with RW  gait min assist bed to chair with RW    10/26 OT  bed mobility CG  transfers min assist with Rw

## 2023-10-31 NOTE — H&P ADULT - ATTENDING COMMENTS
I have personally seen and examined the patient with the resident. Medical records reviewed. I have made amendments to the documentation where necessary and adjusted the history, physical examination, and plan as documented by the resident.     Readmit to [facility] specific rehabilitation program  Admit labs  Resume all medications  Movement Disorder Neurology input with DBS programming as planned  Medicine follow up   Fall, aspiration precautions  Monitor respiratory function closely

## 2023-10-31 NOTE — H&P ADULT - HISTORY OF PRESENT ILLNESS
Patient is a 62yo M with history of severe Parkinson's disease (dx in 2015), HTN, anxiety, who presented to [facility] 10/25 for DBS, now s/p Stage 1 and Stage 2 DBS.He was diagnosed with Parkinson's disease in 2015 when he was having dragging of left leg, and difficulty moving. He was started on levodopa and it worked well for a few years. He now has B/L stiffness, and slowness, but no tremor. He is also experiencing neck discomfort and tilting of his neck forward. He has severe on/off motor fluctuations. He has sudden severe wearing offs of medications, and effects of medication lasts 1-2 hours then he has severe stiffness and slowness. He moves his neck better when he takes the medication. He experiencing freezing of gait, and he can't speak or think at those times. He walks with walker most of the time, and he can usually do his own bathing/dressing/toileting/feeding most days, but sometimes needs help. He is s/p Stage 1 Deep Brain Surgery Bilateral Implantation Into Globus Pallidus Internus With Leksell Frame on 10/25/23 and plan for Stage 2 on 10/30/23. He tolerated procedure well. He was evaluated for admission to acute inpatient rehab and admitted to [facility] on 10/27/23. Subsequently transferred to [facility] for Stage 2 DBS 10/30. Post-operatively, he became hypoxic and required monitoring overnight due to bronchospasm. Patient was evaluated by PM&R and therapy for functional deficits and gait/ ADL impairments and recommended acute rehabilitation. Patient was medically optimized for discharge to [facility] on 10/31.

## 2023-10-31 NOTE — PATIENT PROFILE ADULT - FALL HARM RISK - HARM RISK INTERVENTIONS
Monitor for mental status changes/Monitor gait and stability/Orthostatic vital signs/Use of alarms - bed, chair and/or voice tab/Visual Cue: Yellow wristband and red socks/Bed in lowest position, wheels locked, appropriate side rails in place/Call bell, personal items and telephone in reach/Instruct patient to call for assistance before getting out of bed or chair/Non-slip footwear when patient is out of bed/[place] to call system/Physically safe environment - no spills, clutter or unnecessary equipment/Purposeful Proactive Rounding/Room/bathroom lighting operational, light cord in reach

## 2023-10-31 NOTE — DISCHARGE NOTE PROVIDER - HOSPITAL COURSE
63 year old male with PMH of severe Parkinson's disease, HTN, anxiety. He was diagnosed with Parkinson's disease in 2015 when he was having dragging of left leg, and difficulty moving. He was started on levodopa and it worked well for a few years. He now has B/L stiffness, and slowness, but no tremor. He is also experiencing neck discomfort and tilting of his neck forward. He has severe on/off motor fluctuations. He has sudden severe wearing offs of medications, and effects of medication lasts 1-2 hours then he has severe stiffness and slowness. He moves his neck better when he takes the medication. He experiencing freezing of gait, and he can't speak or think at those times. He walks with walker most of the time, and he can usually do his own bathing/dressing/toileting/feeding most days, but sometimes needs help. He is s/p Stage 1 Deep Brain Surgery Bilateral Implantation Into Globus Pallidus Internus With Leksell Frame on 10/25/23 and plan for Stage 2 on 10/30/23. He tolerated procedure well. He was evaluated and was discharged to acute rehab  Patient was readmitted for stage 2 DBS on 10/31. He tolerated procedure well but had + bronchospasm post procedure requiring observation overnight. Patient was seen this morning in stable condition, he is less confused he is awake, alert oriented to self and place with choices, following commands and COLON. Patient is stable for transfer back to [place] [facility] today

## 2023-10-31 NOTE — CHART NOTE - NSCHARTNOTEFT_GEN_A_CORE
Discussed with Dr. Gil, outpatient neurologist, and with Dr. Kong, neurology attending on morning rounds. [facility] Please continue current Parkinson's disease medication regimen and dosing: Rytary 36.25/145 mg, 3 capsules, 4 times daily.    Spoke with primary team who is in agreement with plan. Discussed with Dr. Gil, outpatient neurologist, and with Dr. Kong, neurology attending on morning rounds. Patient medically and surgically stable for discharge to [facility] without any neurologic contraindications. Please continue current Parkinson's disease medication regimen and dosing: Rytary 36.25/145 mg, 3 capsules, 4 times daily.    Spoke with primary team who is in agreement with plan. As per them patient has already been cleared for acceptance back to [facility]. He is pending transport later today.    Please call neurology ([phone]) with any additional questions or concerns.

## 2023-10-31 NOTE — DISCHARGE NOTE PROVIDER - NSDCFUSCHEDAPPT_GEN_ALL_CORE_FT
Linda Gil  [facility]  NEUROLOGY [address] [facility]  Scheduled Appointment: 11/15/2023

## 2023-10-31 NOTE — H&P ADULT - NSHPREVIEWOFSYSTEMS_GEN_ALL_CORE
REVIEW OF SYSTEMS  Constitutional: No fever, No Chills, No fatigue  HEENT: No eye pain, No visual disturbances, No difficulty hearing  Pulm: No cough,  No shortness of breath  Cardio: No chest pain, No palpitations  GI:  No abdominal pain, No nausea, No vomiting, No diarrhea, +constipation  : No dysuria, No frequency, No hematuria  Neuro: mild headaches, No memory loss, No loss of strength, No numbness, No tremors  Skin: No itching, No rashes, No lesions   MSK: No joint pain, No joint swelling, No muscle pain, No Neck or back pain  Psych:  No depression, No anxiety

## 2023-10-31 NOTE — PHYSICAL THERAPY INITIAL EVALUATION ADULT - GENERAL OBSERVATIONS, REHAB EVAL
Pt rec'd semisupine in bed, in NAD, +IVL, sister at bedside, +PACU monitoring. Agreeable to PT. RN cleared pt to be seen

## 2023-10-31 NOTE — PROGRESS NOTE ADULT - ASSESSMENT
63 year old male with PMH of severe Parkinson's disease, HTN, anxiety. He was diagnosed with Parkinson's disease in 2015 when he was having dragging of left leg, and difficulty moving. He was started on levodopa and it worked well for a few years. He now has B/L stiffness, and slowness, but no tremor. He is also experiencing neck discomfort and tilting of his neck forward. He has severe on/off motor fluctuations. He has sudden severe wearing offs of medications, and effects of medication lasts 1-2 hours then he has severe stiffness and slowness. He moves his neck better when he takes the medication. He experiencing freezing of gait, and he can't speak or think at those times. He walks with walker most of the time, and he can usually do his own bathing/dressing/toileting/feeding most days, but sometimes needs help. He is s/p Stage 1 Deep Brain Surgery Bilateral Implantation Into Globus Pallidus Internus With Leksell Frame on 10/25/23 and plan for Stage 2 on 10/30/23. He tolerated procedure well. He was evaluated for admission to acute inpatient rehab and admitted to [facility] on 10/27/23.     PLAN:  NEURO:  CARDIOVASCULAR:  PULMONARY:  RENAL:  GI:  HEME:  ID:  ENDO:    DVT PROPHYLAXIS:  [] Venodynes                                [] Heparin/Lovenox    FALL RISK:  [] Low Risk                                    [] Impulsive   63 year old male with PMH of severe Parkinson's disease, HTN, anxiety. He was diagnosed with Parkinson's disease in 2015 when he was having dragging of left leg, and difficulty moving. He was started on levodopa and it worked well for a few years. He now has B/L stiffness, and slowness, but no tremor. He is also experiencing neck discomfort and tilting of his neck forward. He has severe on/off motor fluctuations. He has sudden severe wearing offs of medications, and effects of medication lasts 1-2 hours then he has severe stiffness and slowness. He moves his neck better when he takes the medication. He experiencing freezing of gait, and he can't speak or think at those times. He walks with walker most of the time, and he can usually do his own bathing/dressing/toileting/feeding most days, but sometimes needs help. He is s/p Stage 1 Deep Brain Surgery Bilateral Implantation Into Globus Pallidus Internus With Leksell Frame on 10/25/23 and plan for Stage 2 on 10/30/23. He tolerated procedure well. He was evaluated for admission to acute inpatient rehab and admitted to [facility] on 10/27/23.     Stage 2 stereotactic implantation of deep brain stimulator    PLAN:  NEURO:  neuro checks q 2 hours  C/w Parkinson medicarions  Pain medications as needed  C/w all anxiety medication   Tx to rehab today    CARDIOVASCULAR:  Hemodynamically stable    PULMONARY:  On RA satting %  IS as tolerated    RENAL:  IVL  Voiding    GI:  Pureed diet  Bowel regimen : Mirtalax/senna    HEME:  H/H stable    ID:  Afebrile  C/w Keflex fpr 7 days    ENDO:  No issue    DVT PROPHYLAXIS:  [] Venodynes                                [x] Heparin/Lovenox    FALL RISK:  [x] Low Risk                                    [] Impulsive    Will discuss with Dr Garcia  [phone]

## 2023-10-31 NOTE — DISCHARGE NOTE NURSING/CASE MANAGEMENT/SOCIAL WORK - NSDCPEFALRISK_GEN_ALL_CORE
For information on Fall & Injury Prevention, visit: [email or web address] OR  [email or web address] OR  https://www.cdc.gov/steadi/patient.html

## 2023-10-31 NOTE — DISCHARGE NOTE PROVIDER - NSDCCPCAREPLAN_GEN_ALL_CORE_FT
PRINCIPAL DISCHARGE DIAGNOSIS  Diagnosis: Parkinson disease  Assessment and Plan of Treatment: You were in the hospital for stage 2 DBS. [facility] condition [facility] Please continue your home parkinson medications as previously and please follow up with Dr Garcia on 11/15 for post-op care      SECONDARY DISCHARGE DIAGNOSES  Diagnosis: Anxiety  Assessment and Plan of Treatment: Please continue your home meds and follow up with your primary care physician     PRINCIPAL DISCHARGE DIAGNOSIS  Diagnosis: Parkinson disease  Assessment and Plan of Treatment: You were in the hospital for stage 2 DBS. Your condition is [facility] Please continue your home parkinson medications as previously , Po Keflex for a total of 7 days . Please follow up with Dr Garcia on 11/15 for post-op care once discharge from rehab      SECONDARY DISCHARGE DIAGNOSES  Diagnosis: Anxiety  Assessment and Plan of Treatment: Please continue your home meds and follow up with your primary care physician

## 2023-10-31 NOTE — PHYSICAL THERAPY INITIAL EVALUATION ADULT - GAIT DEVIATIONS NOTED, PT EVAL
shuffled/decreased indy/increased time in double stance/decreased step length/decreased stride length/decreased weight-shifting ability

## 2023-10-31 NOTE — PHYSICAL THERAPY INITIAL EVALUATION ADULT - ADDITIONAL COMMENTS
Per prior PT note and confirmed with sister at bedside, Pt lives with his wife in a co-op, no steps required (+elevator). Pt is able to ambulate mostly independently with a rollator, requires some assistance from his wife for ADLs.

## 2023-10-31 NOTE — PATIENT PROFILE ADULT - FUNCTIONAL ASSESSMENT - BASIC MOBILITY 6.
2-calculated by average/Not able to assess (calculate score using [facility] averaging method)

## 2023-10-31 NOTE — OCCUPATIONAL THERAPY INITIAL EVALUATION ADULT - NS ASR FOLLOW COMMAND OT EVAL
verbal and tactile cueing for initiating task/75% of the time/able to follow single-step instructions

## 2023-10-31 NOTE — DISCHARGE NOTE NURSING/CASE MANAGEMENT/SOCIAL WORK - PATIENT PORTAL LINK FT
You can access the FollowMyHealth Patient Portal offered by [facility] by registering at the following website: [email or web address]. By joining [facility]’s FollowMyHealth portal, you will also be able to view your health information using other applications (apps) compatible with our system.

## 2023-10-31 NOTE — DISCHARGE NOTE PROVIDER - NSDCMRMEDTOKEN_GEN_ALL_CORE_FT
bisacodyl 5 mg oral delayed release tablet: 1 tab(s) orally once a day As needed Constipation  carbidopa-levodopa 36.25 mg-145 mg oral capsule, extended release: 3 cap(s) orally 3 times a day  cephalexin 500 mg oral tablet: 1 tab(s) orally 2 times a day for 7 days  clonazePAM 0.5 mg oral tablet: 1 tab(s) orally 2 times a day as needed for  anxiety  erythromycin 0.5% ophthalmic ointment: 1 application to each affected eye 4 times a day  escitalopram 20 mg oral tablet: 1 tab(s) orally once a day  famotidine 20 mg oral tablet: 1 tab(s) orally once a day  gabapentin 100 mg oral tablet: orally once a day  gabapentin 100 mg oral tablet: orally once a day (at bedtime)  Keflex 500 mg oral capsule: 1 cap(s) orally  lidocaine 4% topical film: Apply topically to affected area once a day  polyethylene glycol 3350 oral powder for reconstitution: 17 gram(s) orally 2 times a day  rotigotine 3 mg/24 hr transdermal film, extended release: 1 patch transdermal every 24 hours  senna leaf extract oral tablet: 2 tab(s) orally once a day (at bedtime)   bisacodyl 5 mg oral delayed release tablet: 1 tab(s) orally once a day As needed Constipation  carbidopa-levodopa 36.25 mg-145 mg oral capsule, extended release: 3 cap(s) orally 3 times a day  clonazePAM 0.5 mg oral tablet: 1 tab(s) orally 2 times a day as needed for  anxiety  erythromycin 0.5% ophthalmic ointment: 1 application to each affected eye 4 times a day  escitalopram 20 mg oral tablet: 1 tab(s) orally once a day  famotidine 20 mg oral tablet: 1 tab(s) orally once a day  gabapentin 100 mg oral tablet: orally once a day  gabapentin 100 mg oral tablet: orally once a day (at bedtime)  Keflex 500 mg oral capsule: 1 cap(s) orally 2 times a day x 7 days  lidocaine 4% topical film: Apply topically to affected area once a day  polyethylene glycol 3350 oral powder for reconstitution: 17 gram(s) orally 2 times a day  rotigotine 3 mg/24 hr transdermal film, extended release: 1 patch transdermal every 24 hours  senna leaf extract oral tablet: 2 tab(s) orally once a day (at bedtime)

## 2023-10-31 NOTE — H&P ADULT - NSHPLABSRESULTS_GEN_ALL_CORE
< from: Xray Chest 1 View- PORTABLE-Urgent (10.30.23 @ 13:24) >    COMPARISON STUDY: None available    Frontal expiratory view of the chest shows the heart to be borderline in   size. Spinal stimulator projects over the right chest. Endotracheal tube   terminates just above the medial clavicles. Nasogastric tube reaches the   upper stomach.    The lungs show clear right lung with retrocardiac infiltrate and there is   no evidence of pneumothorax nor definite pleural effusion.    IMPRESSION:  Left infiltrate.      < from: CT Head No Cont (10.26.23 @ 01:44) >    FINDINGS:  VENTRICLES AND SULCI:  Age-appropriate involutional change  INTRA-AXIAL:  Presence of a deep brain stimulator whose tip appears to be   in the left thalamic capsular region clinical correlation is recommended..  EXTRA-AXIAL:  No mass or collection is seen.  VISUALIZED SINUSES:  Clear.  VISUALIZED MASTOIDS:  Clear.  CALVARIUM:  Left frontal marie hole  MISCELLANEOUS:  None.    IMPRESSION:  Deep brain stimulator with tip in the left thalamic capsular   region. Clinical correlation is necessary

## 2023-10-31 NOTE — OCCUPATIONAL THERAPY INITIAL EVALUATION ADULT - VISUAL ACUITY
pt with fixed posture of cervical flexion, unable to make full eye contact with targeted large obejcts in front/not tested

## 2023-10-31 NOTE — H&P ADULT - NSHPPHYSICALEXAM_GEN_ALL_CORE
Constitutional - NAD, Comfortable  HEENT - Surgical incisions c/d/i with staples CARSON, EOMI  Neck - Supple, No limited ROM  Chest - good chest expansion, good respiratory effort, CTAB   Cardio - warm and well perfused, RRR, no murmur  Abdomen -  Soft, NTND  Extremities - No peripheral edema, No calf tenderness   Neurologic Exam:                    Cognitive -             Orientation: Awake, Alert, AAO to self, year but not month or place            Attention:  recall 1 objects with MC             Memory: Recent/ remote memory impaired            Thought: process, content appropriate     Speech - Fluent, Comprehensible, + dysarthria, No aphasia      Cranial Nerves - No facial asymmetry, Tongue midline, EOMI, Shoulder shrug intact     Motor -                      LEFT    UE - ShAB 5/5, EF 5/5, EE 5/5, WE 5/5,  WNL                    RIGHT UE - ShAB 5/5, EF 5/5, EE 5/5, WE 5/5,  WNL                    LEFT    LE - HF 5/5, KE 5/5, DF 5/5, PF 5/5                    RIGHT LE - HF 5/5, KE 5/5, DF 5/5, PF 5/5        Sensory - Intact to LT bilateral     Reflexes - DTR 2/ 4 , neg Mancia's b/l     Coordination - FTN / HTS intact     OculoVestibular -  No nystagmus  Psychiatric - Mood stable, Affect WNL  Skin on admission: DBS surgical incision with staples, right chest incision Constitutional - NAD, Comfortable  HEENT - Surgical incisions c/d/i with staples CARSON, EOMI  Neck - Supple, No limited ROM  Chest - good chest expansion, good respiratory effort, CTAB   Cardio - warm and well perfused, RRR, no murmur  Abdomen -  Soft, NTND  Extremities - No peripheral edema, No calf tenderness   Neurologic Exam:                    Cognitive -             Orientation: Awake, Alert, AAO to self, year but not month or place            Attention:  recall 1 objects with MC             Memory: Recent/ remote memory impaired            Thought: process, content appropriate     Speech - Fluent, Comprehensible, + dysarthria, No aphasia      Cranial Nerves - VFF, +LR=, EOMI,  No facial asymmetry, Tongue midline     Motor -  rigidity, bradykinesia                     LEFT    UE - ShAB 5/5, EF 5/5, EE 5/5, WE 5/5,  WNL                    RIGHT UE - ShAB 5/5, EF 5/5, EE 5/5, WE 5/5,  WNL                    LEFT    LE - HF 5/5, KE 5/5, DF 5/5, PF 5/5                    RIGHT LE - HF 5/5, KE 5/5, DF 5/5, PF 5/5        Sensory - Intact to LT bilaterally     Reflexes - DTR 2/ 4 , neg Mancia's b/l     Coordination - FTN / HTS intact     OculoVestibular -  No nystagmus  Psychiatric - Mood stable, Affect WNL  Skin on admission: DBS surgical incision with staples, right chest incision

## 2023-11-01 LAB
ALBUMIN SERPL ELPH-MCNC: 3 G/DL — LOW (ref 3.3–5)
ALBUMIN SERPL ELPH-MCNC: 3 G/DL — LOW (ref 3.3–5)
ALP SERPL-CCNC: 71 U/L — SIGNIFICANT CHANGE UP (ref 40–120)
ALP SERPL-CCNC: 71 U/L — SIGNIFICANT CHANGE UP (ref 40–120)
ALT FLD-CCNC: 7 U/L — LOW (ref 10–45)
ALT FLD-CCNC: 7 U/L — LOW (ref 10–45)
ANION GAP SERPL CALC-SCNC: 9 MMOL/L — SIGNIFICANT CHANGE UP (ref 5–17)
ANION GAP SERPL CALC-SCNC: 9 MMOL/L — SIGNIFICANT CHANGE UP (ref 5–17)
AST SERPL-CCNC: 12 U/L — SIGNIFICANT CHANGE UP (ref 10–40)
AST SERPL-CCNC: 12 U/L — SIGNIFICANT CHANGE UP (ref 10–40)
BASOPHILS # BLD AUTO: 0.04 K/UL — SIGNIFICANT CHANGE UP (ref 0–0.2)
BASOPHILS # BLD AUTO: 0.04 K/UL — SIGNIFICANT CHANGE UP (ref 0–0.2)
BASOPHILS NFR BLD AUTO: 0.5 % — SIGNIFICANT CHANGE UP (ref 0–2)
BASOPHILS NFR BLD AUTO: 0.5 % — SIGNIFICANT CHANGE UP (ref 0–2)
BILIRUB SERPL-MCNC: 0.6 MG/DL — SIGNIFICANT CHANGE UP (ref 0.2–1.2)
BILIRUB SERPL-MCNC: 0.6 MG/DL — SIGNIFICANT CHANGE UP (ref 0.2–1.2)
BUN SERPL-MCNC: 8 MG/DL — SIGNIFICANT CHANGE UP (ref 7–23)
BUN SERPL-MCNC: 8 MG/DL — SIGNIFICANT CHANGE UP (ref 7–23)
CALCIUM SERPL-MCNC: 8.8 MG/DL — SIGNIFICANT CHANGE UP (ref 8.4–10.5)
CALCIUM SERPL-MCNC: 8.8 MG/DL — SIGNIFICANT CHANGE UP (ref 8.4–10.5)
CHLORIDE SERPL-SCNC: 99 MMOL/L — SIGNIFICANT CHANGE UP (ref 96–108)
CHLORIDE SERPL-SCNC: 99 MMOL/L — SIGNIFICANT CHANGE UP (ref 96–108)
CO2 SERPL-SCNC: 28 MMOL/L — SIGNIFICANT CHANGE UP (ref 22–31)
CO2 SERPL-SCNC: 28 MMOL/L — SIGNIFICANT CHANGE UP (ref 22–31)
CREAT SERPL-MCNC: 0.55 MG/DL — SIGNIFICANT CHANGE UP (ref 0.5–1.3)
CREAT SERPL-MCNC: 0.55 MG/DL — SIGNIFICANT CHANGE UP (ref 0.5–1.3)
EGFR: 112 ML/MIN/1.73M2 — SIGNIFICANT CHANGE UP
EGFR: 112 ML/MIN/1.73M2 — SIGNIFICANT CHANGE UP
EOSINOPHIL # BLD AUTO: 0.24 K/UL — SIGNIFICANT CHANGE UP (ref 0–0.5)
EOSINOPHIL # BLD AUTO: 0.24 K/UL — SIGNIFICANT CHANGE UP (ref 0–0.5)
EOSINOPHIL NFR BLD AUTO: 2.9 % — SIGNIFICANT CHANGE UP (ref 0–6)
EOSINOPHIL NFR BLD AUTO: 2.9 % — SIGNIFICANT CHANGE UP (ref 0–6)
GLUCOSE SERPL-MCNC: 81 MG/DL — SIGNIFICANT CHANGE UP (ref 70–99)
GLUCOSE SERPL-MCNC: 81 MG/DL — SIGNIFICANT CHANGE UP (ref 70–99)
HCT VFR BLD CALC: 39.2 % — SIGNIFICANT CHANGE UP (ref 39–50)
HCT VFR BLD CALC: 39.2 % — SIGNIFICANT CHANGE UP (ref 39–50)
HGB BLD-MCNC: 12.5 G/DL — LOW (ref 13–17)
HGB BLD-MCNC: 12.5 G/DL — LOW (ref 13–17)
IMM GRANULOCYTES NFR BLD AUTO: 0.4 % — SIGNIFICANT CHANGE UP (ref 0–0.9)
IMM GRANULOCYTES NFR BLD AUTO: 0.4 % — SIGNIFICANT CHANGE UP (ref 0–0.9)
LYMPHOCYTES # BLD AUTO: 1.84 K/UL — SIGNIFICANT CHANGE UP (ref 1–3.3)
LYMPHOCYTES # BLD AUTO: 1.84 K/UL — SIGNIFICANT CHANGE UP (ref 1–3.3)
LYMPHOCYTES # BLD AUTO: 22.1 % — SIGNIFICANT CHANGE UP (ref 13–44)
LYMPHOCYTES # BLD AUTO: 22.1 % — SIGNIFICANT CHANGE UP (ref 13–44)
MCHC RBC-ENTMCNC: 29.8 PG — SIGNIFICANT CHANGE UP (ref 27–34)
MCHC RBC-ENTMCNC: 29.8 PG — SIGNIFICANT CHANGE UP (ref 27–34)
MCHC RBC-ENTMCNC: 31.9 GM/DL — LOW (ref 32–36)
MCHC RBC-ENTMCNC: 31.9 GM/DL — LOW (ref 32–36)
MCV RBC AUTO: 93.6 FL — SIGNIFICANT CHANGE UP (ref 80–100)
MCV RBC AUTO: 93.6 FL — SIGNIFICANT CHANGE UP (ref 80–100)
MONOCYTES # BLD AUTO: 0.82 K/UL — SIGNIFICANT CHANGE UP (ref 0–0.9)
MONOCYTES # BLD AUTO: 0.82 K/UL — SIGNIFICANT CHANGE UP (ref 0–0.9)
MONOCYTES NFR BLD AUTO: 9.8 % — SIGNIFICANT CHANGE UP (ref 2–14)
MONOCYTES NFR BLD AUTO: 9.8 % — SIGNIFICANT CHANGE UP (ref 2–14)
NEUTROPHILS # BLD AUTO: 5.36 K/UL — SIGNIFICANT CHANGE UP (ref 1.8–7.4)
NEUTROPHILS # BLD AUTO: 5.36 K/UL — SIGNIFICANT CHANGE UP (ref 1.8–7.4)
NEUTROPHILS NFR BLD AUTO: 64.3 % — SIGNIFICANT CHANGE UP (ref 43–77)
NEUTROPHILS NFR BLD AUTO: 64.3 % — SIGNIFICANT CHANGE UP (ref 43–77)
NRBC # BLD: 0 /100 WBCS — SIGNIFICANT CHANGE UP (ref 0–0)
NRBC # BLD: 0 /100 WBCS — SIGNIFICANT CHANGE UP (ref 0–0)
PLATELET # BLD AUTO: 310 K/UL — SIGNIFICANT CHANGE UP (ref 150–400)
PLATELET # BLD AUTO: 310 K/UL — SIGNIFICANT CHANGE UP (ref 150–400)
POTASSIUM SERPL-MCNC: 3.8 MMOL/L — SIGNIFICANT CHANGE UP (ref 3.5–5.3)
POTASSIUM SERPL-MCNC: 3.8 MMOL/L — SIGNIFICANT CHANGE UP (ref 3.5–5.3)
POTASSIUM SERPL-SCNC: 3.8 MMOL/L — SIGNIFICANT CHANGE UP (ref 3.5–5.3)
POTASSIUM SERPL-SCNC: 3.8 MMOL/L — SIGNIFICANT CHANGE UP (ref 3.5–5.3)
PROT SERPL-MCNC: 6.5 G/DL — SIGNIFICANT CHANGE UP (ref 6–8.3)
PROT SERPL-MCNC: 6.5 G/DL — SIGNIFICANT CHANGE UP (ref 6–8.3)
RBC # BLD: 4.19 M/UL — LOW (ref 4.2–5.8)
RBC # BLD: 4.19 M/UL — LOW (ref 4.2–5.8)
RBC # FLD: 13.1 % — SIGNIFICANT CHANGE UP (ref 10.3–14.5)
RBC # FLD: 13.1 % — SIGNIFICANT CHANGE UP (ref 10.3–14.5)
SODIUM SERPL-SCNC: 136 MMOL/L — SIGNIFICANT CHANGE UP (ref 135–145)
SODIUM SERPL-SCNC: 136 MMOL/L — SIGNIFICANT CHANGE UP (ref 135–145)
WBC # BLD: 8.33 K/UL — SIGNIFICANT CHANGE UP (ref 3.8–10.5)
WBC # BLD: 8.33 K/UL — SIGNIFICANT CHANGE UP (ref 3.8–10.5)
WBC # FLD AUTO: 8.33 K/UL — SIGNIFICANT CHANGE UP (ref 3.8–10.5)
WBC # FLD AUTO: 8.33 K/UL — SIGNIFICANT CHANGE UP (ref 3.8–10.5)

## 2023-11-01 PROCEDURE — 99232 SBSQ HOSP IP/OBS MODERATE 35: CPT

## 2023-11-01 PROCEDURE — 99233 SBSQ HOSP IP/OBS HIGH 50: CPT

## 2023-11-01 PROCEDURE — 90832 PSYTX W PT 30 MINUTES: CPT

## 2023-11-01 PROCEDURE — 99223 1ST HOSP IP/OBS HIGH 75: CPT

## 2023-11-01 RX ADMIN — SENNA PLUS 2 TABLET(S): 8.6 TABLET ORAL at 20:14

## 2023-11-01 RX ADMIN — GABAPENTIN 100 MILLIGRAM(S): 400 CAPSULE ORAL at 12:05

## 2023-11-01 RX ADMIN — CARBIDOPA AND LEVODOPA 3 CAPSULE(S): 25; 100 TABLET ORAL at 15:57

## 2023-11-01 RX ADMIN — Medication 1 APPLICATION(S): at 12:05

## 2023-11-01 RX ADMIN — ESCITALOPRAM OXALATE 20 MILLIGRAM(S): 10 TABLET, FILM COATED ORAL at 12:06

## 2023-11-01 RX ADMIN — Medication 1 APPLICATION(S): at 06:24

## 2023-11-01 RX ADMIN — GABAPENTIN 100 MILLIGRAM(S): 400 CAPSULE ORAL at 20:13

## 2023-11-01 RX ADMIN — CARBIDOPA AND LEVODOPA 3 CAPSULE(S): 25; 100 TABLET ORAL at 08:08

## 2023-11-01 RX ADMIN — CARBIDOPA AND LEVODOPA 3 CAPSULE(S): 25; 100 TABLET ORAL at 12:05

## 2023-11-01 RX ADMIN — CARBIDOPA AND LEVODOPA 3 CAPSULE(S): 25; 100 TABLET ORAL at 20:07

## 2023-11-01 RX ADMIN — Medication 500 MILLIGRAM(S): at 17:21

## 2023-11-01 RX ADMIN — ROTIGOTINE 1 PATCH: 8 PATCH, EXTENDED RELEASE TRANSDERMAL at 15:57

## 2023-11-01 RX ADMIN — FAMOTIDINE 20 MILLIGRAM(S): 10 INJECTION INTRAVENOUS at 12:09

## 2023-11-01 RX ADMIN — Medication 1 APPLICATION(S): at 17:21

## 2023-11-01 RX ADMIN — Medication 500 MILLIGRAM(S): at 06:24

## 2023-11-01 NOTE — DIETITIAN INITIAL EVALUATION ADULT - PERTINENT MEDS FT
MEDICATIONS  (STANDING):  carbidopa 36.25 mG/levodopa 145 mG ER 3 Capsule(s) Oral <User Schedule>  cephalexin 500 milliGRAM(s) Oral every 12 hours  erythromycin   Ointment 1 Application(s) Both EYES four times a day  escitalopram 20 milliGRAM(s) Oral daily  famotidine    Tablet 20 milliGRAM(s) Oral daily  gabapentin 100 milliGRAM(s) Oral at bedtime  gabapentin 100 milliGRAM(s) Oral daily  lidocaine   4% Patch 2 Patch Transdermal <User Schedule>  polyethylene glycol 3350 17 Gram(s) Oral two times a day  rotigotine patch 3 mG/24 Hr(s) 1 Patch Transdermal <User Schedule>  senna 2 Tablet(s) Oral at bedtime    MEDICATIONS  (PRN):  acetaminophen     Tablet .. 650 milliGRAM(s) Oral every 6 hours PRN Mild Pain (1 - 3)  bisacodyl 5 milliGRAM(s) Oral at bedtime PRN Constipation  clonazePAM  Tablet 0.5 milliGRAM(s) Oral two times a day PRN for anxiety

## 2023-11-01 NOTE — DIETITIAN INITIAL EVALUATION ADULT - PERTINENT LABORATORY DATA
11-01    136  |  99  |  8   ----------------------------<  81  3.8   |  28  |  0.55    Ca    8.8      01 Nov 2023 05:18    TPro  6.5  /  Alb  3.0<L>  /  TBili  0.6  /  DBili  x   /  AST  12  /  ALT  7<L>  /  AlkPhos  71  11-01

## 2023-11-01 NOTE — PROGRESS NOTE ADULT - ASSESSMENT
Patient is a 62yo M with history of severe Parkinson's disease (dx in 2015), HTN, anxiety, who presented to [facility] 10/25 for DBS, now s/p Stage 1 and Stage 2 DBS. Hospital course complicated by brochospasm requiring overnight observation postoperatively, also now with dysphagia. Admitted to [facility] acute inpatient rehab on 10/31 for ADL, gait, and functional impairments.     #Parkinson's Disease now with gait Instability, ADL impairments and Functional impairments  - Start Comprehensive Rehab Program of PT/OT/SLP    ------------------------------------------------------  Parkinson's related motor symptoms    #Rigidity/Bradykinesia/wearing off  -S/p Stage 1 Deep Brain Surgery Bilateral Implantation Into Globus Pallidus Internus With Leksell Frame on 10/25/23    -S/p Stage 2 on 10/30/23 - monitored overnight for hypoxia 2/2 bronchospasm  -Continue Keflex 500mg BID for post-op ppx x7 days  -Continue Rytary 145mg 3 tabs at 8a, 12pm, 4pm and 8pm [During last visit with  , discussed about increase to 4 cap 4 times a day --> but wife has him on 3 cap 4 times a day] ]  -Continue Neupro patch 3mg /24hours daily [ Home Neupro patch 4mg /24, confirmed with wife]  -Movement disorders following    --------------------------------------------------------  Parkinson's related non-motor symptoms    #Mood/anxiety  -Continue lexapro 20mg daily  -Continue Clonazepam 0.5mg BID PRN  -Neuropsych to follow    #Orthostatic hypotension  -Monitor OH vital signs    #Pain control  - Tylenol PRN  - Lidocaine patches to b/l knees  -Continue gabapentin 100mg in am and 200mg at bedtime     #GI/Bowel Management/Constipation  - Continue Senna at bedtime   - Miralax BID  - Bisacodyl PRN  -Pepcid 20mg daily    #Bladder management  - Continue to monitor PVR q 8 hours (SC if > 400)  -Monitor UO    ----------------------------------------------------------  Concurrent medical problems      #DVT Prophylaxis  - TEDs   - pharmacologic ppx contraindicated given recent surgery    #Skin:  -3 surgical sites on top of skull. Clean dry and intact. Staples present. No fluctuance, erythema, drainage, or oozing at time of exam.  -Do not remove dressings   -Can shower 2 day post-op but do not scrub incision    FEN   - Diet - Pureed Diet, Mildly Thick Liquids  [CCHO, DASH/TLC]    - Dysphagia  SLP - evaluation and treatment    Precautions / PROPHYLAXIS:   - Falls  - ortho: Weight bearing status: WBAT   - Lungs: Aspiration, Incentive Spirometer   - Pressure injury/Skin: Turn Q2hrs while in bed, OOB to Chair, PT/OT        MEDICAL PROGNOSIS: GOOD              REHAB POTENTIAL: GOOD              ESTIMATED DISPOSITION: HOME WITH HOME CARE              ELOS: 10-14 Days   EXPECTED THERAPY:     P.T. 1hr/day       O.T. 1hr/day      S.L.P. 1hr/day       P&O Unnecessary       EXP FREQUENCY: 5 days per 7 day period      Patient is a 62yo M with history of severe Parkinson's disease (dx in 2015), HTN, anxiety, who presented to [facility] 10/25 for DBS, now s/p Stage 1 and Stage 2 DBS. Hospital course complicated by brochospasm requiring overnight observation postoperatively, also now with dysphagia. Admitted to [place] acute inpatient rehab on 10/31 for ADL, gait, and functional impairments.     #Parkinson's Disease now with gait Instability, ADL impairments and Functional impairments  - Start Comprehensive Rehab Program of PT/OT/SLP    ------------------------------------------------------  Parkinson's related motor symptoms    #Rigidity/Bradykinesia/wearing off  -S/p Stage 1 Deep Brain Surgery Bilateral Implantation Into Globus Pallidus Internus With Leksell Frame on 10/25/23    -S/p Stage 2 on 10/30/23 - monitored overnight for hypoxia 2/2 bronchospasm  -Continue Keflex 500mg BID for post-op ppx x7 days  -Continue Rytary 145mg 3 tabs at 8a, 12pm, 4pm and 8pm [During last visit with  , discussed about increase to 4 cap 4 times a day --> but wife has him on 3 cap 4 times a day] ]  -Continue Neupro patch 3mg /24hours daily [ Home Neupro patch 4mg /24, confirmed with wife]  -Movement disorders following    --------------------------------------------------------  Parkinson's related non-motor symptoms    #Mood/anxiety  -Continue lexapro 20mg daily  -Continue Clonazepam 0.5mg BID PRN  -Neuropsych to follow    #Orthostatic hypotension  -Monitor OH vital signs    #Pain control  - Tylenol PRN  - Lidocaine patches to b/l knees  -Continue gabapentin 100mg in am and 200mg at bedtime     #GI/Bowel Management/Constipation  - Continue Senna at bedtime   - Miralax BID  - Bisacodyl PRN  -Pepcid 20mg daily    #Bladder management  - Continue to monitor PVR q 8 hours (SC if > 400)  -Monitor UO    ----------------------------------------------------------  Concurrent medical problems      #DVT Prophylaxis  - TEDs   - pharmacologic ppx contraindicated given recent surgery    #Skin:  -3 surgical sites on top of skull. Clean dry and intact. Staples present. No fluctuance, erythema, drainage, or oozing at time of exam.  -Do not remove dressings   -Can shower 2 day post-op but do not scrub incision    FEN   - Diet - Pureed Diet, Mildly Thick Liquids  [CCHO, DASH/TLC]    - Dysphagia  SLP - evaluation and treatment    Precautions / PROPHYLAXIS:   - Falls  - ortho: Weight bearing status: WBAT   - Lungs: Aspiration, Incentive Spirometer   - Pressure injury/Skin: Turn Q2hrs while in bed, OOB to Chair, PT/OT

## 2023-11-01 NOTE — PROGRESS NOTE ADULT - SUBJECTIVE AND OBJECTIVE BOX
Pt was seen for 30 min for supportive tx. Pt c/o fatigue. Reviewed chart, approached Pt for an initial consult, introduced the role of neuropsychology and explained th enature and purpose of the consult. Pt agreed to participate. Pt is a 62 y/o male with history of severe Parkinson's disease (dx in 2015), HTN, anxiety, who presented to [facility] 10/25 for DBS, now s/p Stage 1 and Stage 2 DBS. Pt agreed to participate. Pt was mildly restless. Session focused on establishing rapport with Pt, gathering biographical information and assessing his current emotional functioning. Pt provided information about his medical and social hx, and also answered all questions during a clinical interview to determine his current mental state. Pt appeared mildly confused, e.g., reporting his age as 57. Pt reported sad mood, helplessness/hopelessness, poor sleep, low energy, poor appetite and fatigue. Support and encouragement were provided.

## 2023-11-01 NOTE — DIETITIAN INITIAL EVALUATION ADULT - CALCULATED FROM (G/KG)
Pt VSS and afebrile. Patient had no complaints of ctx, RUQ pain, blur vision, H/A, or vaginal bleeding. Pt stated intermitted heart palpations. +FM. No acute change throughout the night. Call light within reach. Safety maintained. Will continue to monitor.   Problem: Hypertensive Disorders in Pregnancy  Goal: Maternal-Fetal Stabilization  Outcome: Ongoing, Progressing      PAST MEDICAL HISTORY:  Atrial fibrillation      86.1

## 2023-11-01 NOTE — PROGRESS NOTE ADULT - SUBJECTIVE AND OBJECTIVE BOX
Subjective  patient seen and examined at bedside  participated with therapy this morni. Patient pleasantly confused this morning  was able to tell me his name, year and president, comprehension intact      HPI  Patient is a 64yo M with history of severe Parkinson's disease (dx in 2015) follows with , HTN, anxiety, who presented to [facility] 10/25 for DBS, now s/p Stage 1 and Stage 2 DBS.He was diagnosed with Parkinson's disease in 2015 when he was having dragging of left leg, and difficulty moving. He was started on levodopa and it worked well for a few years. He now has B/L stiffness, and slowness, but no tremor. He is also experiencing neck discomfort and tilting of his neck forward. He has severe on/off motor fluctuations. He has sudden severe wearing offs of medications, and effects of medication lasts 1-2 hours then he has severe stiffness and slowness. He moves his neck better when he takes the medication. He experiencing freezing of gait, and he can't speak or think at those times. He walks with walker most of the time, and he can usually do his own bathing/dressing/toileting/feeding most days, but sometimes needs help. He is s/p Stage 1 Deep Brain Surgery Bilateral Implantation Into Globus Pallidus Internus With Leksell Frame on 10/25/23 and plan for Stage 2 on 10/30/23. He tolerated procedure well. He was evaluated for admission to acute inpatient rehab and admitted to [facility] on 10/27/23. Subsequently transferred to [facility] for Stage 2 DBS 10/30. Post-operatively, he became hypoxic and required monitoring overnight due to bronchospasm. Patient was evaluated by PM&R and therapy for functional deficits and gait/ ADL impairments and recommended acute rehabilitation. Patient was medically optimized for discharge to [facility] on 10/31.       Vital Signs Last 24 Hrs  T(C): 36.6 (2023 10:00), Max: 36.7 (31 Oct 2023 15:30)  T(F): 97.8 (2023 10:00), Max: 98.1 (31 Oct 2023 15:30)  HR: 94 (2023 10:00) (62 - 94)  BP: 158/90 (2023 10:00) (125/71 - 158/93)  BP(mean): 92 (31 Oct 2023 15:30) (92 - 117)  RR: 16 (2023 10:00) (16 - 18)  SpO2: 96% (2023 10:00) (95% - 100%)    Parameters below as of 2023 10:00  Patient On (Oxygen Delivery Method): room air        MEDICATIONS  (STANDING):  carbidopa 36.25 mG/levodopa 145 mG ER 3 Capsule(s) Oral <User Schedule>  cephalexin 500 milliGRAM(s) Oral every 12 hours  erythromycin   Ointment 1 Application(s) Both EYES four times a day  escitalopram 20 milliGRAM(s) Oral daily  famotidine    Tablet 20 milliGRAM(s) Oral daily  gabapentin 100 milliGRAM(s) Oral at bedtime  gabapentin 100 milliGRAM(s) Oral daily  lidocaine   4% Patch 2 Patch Transdermal <User Schedule>  polyethylene glycol 3350 17 Gram(s) Oral two times a day  rotigotine patch 3 mG/24 Hr(s) 1 Patch Transdermal <User Schedule>  senna 2 Tablet(s) Oral at bedtime      PHYSICAL EXAM  general: awake, alert oriented to self, able to tell me the year and president, unable to tell the , reason for admission  HEENT: EOMI  Cardiac: pulses palpable  resp: breathing on room air  GI: abd soft  neuro  awake, alert   hypophonia  bradykinesia grade 1 b/l left> rt  no rest tremor noted  rigidity left > rt

## 2023-11-01 NOTE — DIETITIAN INITIAL EVALUATION ADULT - SIGNS/SYMPTOMS
as evidence by need for mechanically altered diet per SLP recommendations.  as evidenced by consuming <50% of meals.

## 2023-11-01 NOTE — DIETITIAN INITIAL EVALUATION ADULT - OTHER INFO
Pt is 64yo M with history of severe Parkinson's disease (dx in 2015), HTN, anxiety, who presented to [facility] 10/25 for DBS, now s/p Stage 1 and Stage 2 DBS. Hospital course complicated by brochospasm requiring overnight observation postoperatively, also now with dysphagia. Admitted to [place] acute inpatient rehab on 10/31 for ADL, gait, and functional impairments.     Pt is consuming current diet order Pureed, Mildly thick liquids. Pt denies chewing or swallowing difficulty. SLP will meet with the pt and recommend what foods the pt will eat. Reports not eating enough at home. Based on pt's 24 hour diet recall, his EEN is not being met. Pt reports UBW of 185pounds. Current weight is 217pounds. Pt has gained weight of 32 pounds. Nutrition focused physical exam was performed....   Pt is 64yo M with history of severe Parkinson's disease (dx in 2015), HTN, anxiety, who presented to [facility] 10/25 for DBS, now s/p Stage 1 and Stage 2 DBS. Hospital course complicated by brochospasm requiring overnight observation postoperatively, also now with dysphagia. Admitted to [place] acute inpatient rehab on 10/31 for ADL, gait, and functional impairments.     Pt is on a pureed diet with mildly thick liquids. Noted w/ suboptimal PO intake based on observations during meal round and pt report. Pt is hypophonic, difficult to obtain much diet hx or food preferences from pt during interview.  SLP following pt to make recommendations on appropriate food/fluid consistency. Will follow SLP recommendatons. Pt not meeting estimated energy needs based on current PO intake and diet recall PTA. Pt reports UBW of 185 pounds but his current weight is 217  pounds. Uncertain if pt is a reliable historian about weight hx. No edema per nursing flow sheets. Pt refused the Nutrition focused physical exam. Discussed food preferences with pt to optimize PO intake. Pt receptive to trying Magic Cup fortified ice cream (provides 290 kcal, 9 g protein/serving). Suggest adding a multivitamin. Pt provided with education on levodopa-protein interaction.

## 2023-11-01 NOTE — DIETITIAN INITIAL EVALUATION ADULT - FUNCTIONAL SCREEN CURRENT LEVEL: SWALLOWING (IF SCORE 2 OR MORE FOR ANY ITEM, CONSULT REHAB SERVICES), MLM)
Increase clear fluid intake  Stop all current over the counter cough, cold, flu medicine-most of these have nasal decongestants in them that will raise your blood pressure.  By an automatic blood pressure cuff and start monitoring your blood pressure twice daily.  Keep a log of these and it your next visit with your primary care provider present this log for evaluation  Tylenol/motrin otc for fever or pain  Flonase nasal spray and xyzal for sinus congestion and pressure.  Take Mucinex DM as needed for chest congestion and tessalon perles as needed for daytime coughing. Take mucinex with a full glass of water at each dose  May take promethazine cough syrup as needed for nighttime cough.  Saltwater gargles 4 x daily and benzocaine anesthetic throat lozenges for sore throat. May also add honey based cough syrup  Follow up with PCP  Go immediately to the nearest emergency room for shortness of breath, chest pain,  or other emergent concern.  Return to clinic for new, worse, or unresolving symptoms     0 = swallows foods/liquids without difficulty 2 = difficulty swallowing liquids/foods

## 2023-11-01 NOTE — PROGRESS NOTE ADULT - ASSESSMENT
Pt alert, attentive, hypophonic/mildly dysarthric speech, thought processes goal-directed, no abnormal thought contents noted, depressed affect, euthymic mood, denied AH/VH, denied SI/HI/I/P, mild restlessness. Plan: Continue the assessment process. Provide supportive tx.

## 2023-11-02 LAB
ALBUMIN SERPL ELPH-MCNC: 3.1 G/DL — LOW (ref 3.3–5)
ALBUMIN SERPL ELPH-MCNC: 3.1 G/DL — LOW (ref 3.3–5)
ALP SERPL-CCNC: 74 U/L — SIGNIFICANT CHANGE UP (ref 40–120)
ALP SERPL-CCNC: 74 U/L — SIGNIFICANT CHANGE UP (ref 40–120)
ALT FLD-CCNC: 8 U/L — LOW (ref 10–45)
ALT FLD-CCNC: 8 U/L — LOW (ref 10–45)
ANION GAP SERPL CALC-SCNC: 10 MMOL/L — SIGNIFICANT CHANGE UP (ref 5–17)
ANION GAP SERPL CALC-SCNC: 10 MMOL/L — SIGNIFICANT CHANGE UP (ref 5–17)
AST SERPL-CCNC: 12 U/L — SIGNIFICANT CHANGE UP (ref 10–40)
AST SERPL-CCNC: 12 U/L — SIGNIFICANT CHANGE UP (ref 10–40)
BILIRUB SERPL-MCNC: 0.7 MG/DL — SIGNIFICANT CHANGE UP (ref 0.2–1.2)
BILIRUB SERPL-MCNC: 0.7 MG/DL — SIGNIFICANT CHANGE UP (ref 0.2–1.2)
BUN SERPL-MCNC: 14 MG/DL — SIGNIFICANT CHANGE UP (ref 7–23)
BUN SERPL-MCNC: 14 MG/DL — SIGNIFICANT CHANGE UP (ref 7–23)
CALCIUM SERPL-MCNC: 9 MG/DL — SIGNIFICANT CHANGE UP (ref 8.4–10.5)
CALCIUM SERPL-MCNC: 9 MG/DL — SIGNIFICANT CHANGE UP (ref 8.4–10.5)
CHLORIDE SERPL-SCNC: 99 MMOL/L — SIGNIFICANT CHANGE UP (ref 96–108)
CHLORIDE SERPL-SCNC: 99 MMOL/L — SIGNIFICANT CHANGE UP (ref 96–108)
CO2 SERPL-SCNC: 28 MMOL/L — SIGNIFICANT CHANGE UP (ref 22–31)
CO2 SERPL-SCNC: 28 MMOL/L — SIGNIFICANT CHANGE UP (ref 22–31)
CREAT SERPL-MCNC: 0.67 MG/DL — SIGNIFICANT CHANGE UP (ref 0.5–1.3)
CREAT SERPL-MCNC: 0.67 MG/DL — SIGNIFICANT CHANGE UP (ref 0.5–1.3)
EGFR: 105 ML/MIN/1.73M2 — SIGNIFICANT CHANGE UP
EGFR: 105 ML/MIN/1.73M2 — SIGNIFICANT CHANGE UP
GLUCOSE SERPL-MCNC: 97 MG/DL — SIGNIFICANT CHANGE UP (ref 70–99)
GLUCOSE SERPL-MCNC: 97 MG/DL — SIGNIFICANT CHANGE UP (ref 70–99)
HCT VFR BLD CALC: 39.5 % — SIGNIFICANT CHANGE UP (ref 39–50)
HCT VFR BLD CALC: 39.5 % — SIGNIFICANT CHANGE UP (ref 39–50)
HGB BLD-MCNC: 12.9 G/DL — LOW (ref 13–17)
HGB BLD-MCNC: 12.9 G/DL — LOW (ref 13–17)
MCHC RBC-ENTMCNC: 30.7 PG — SIGNIFICANT CHANGE UP (ref 27–34)
MCHC RBC-ENTMCNC: 30.7 PG — SIGNIFICANT CHANGE UP (ref 27–34)
MCHC RBC-ENTMCNC: 32.7 GM/DL — SIGNIFICANT CHANGE UP (ref 32–36)
MCHC RBC-ENTMCNC: 32.7 GM/DL — SIGNIFICANT CHANGE UP (ref 32–36)
MCV RBC AUTO: 94 FL — SIGNIFICANT CHANGE UP (ref 80–100)
MCV RBC AUTO: 94 FL — SIGNIFICANT CHANGE UP (ref 80–100)
NRBC # BLD: 0 /100 WBCS — SIGNIFICANT CHANGE UP (ref 0–0)
NRBC # BLD: 0 /100 WBCS — SIGNIFICANT CHANGE UP (ref 0–0)
PLATELET # BLD AUTO: 347 K/UL — SIGNIFICANT CHANGE UP (ref 150–400)
PLATELET # BLD AUTO: 347 K/UL — SIGNIFICANT CHANGE UP (ref 150–400)
POTASSIUM SERPL-MCNC: 3.4 MMOL/L — LOW (ref 3.5–5.3)
POTASSIUM SERPL-MCNC: 3.4 MMOL/L — LOW (ref 3.5–5.3)
POTASSIUM SERPL-SCNC: 3.4 MMOL/L — LOW (ref 3.5–5.3)
POTASSIUM SERPL-SCNC: 3.4 MMOL/L — LOW (ref 3.5–5.3)
PROT SERPL-MCNC: 6.7 G/DL — SIGNIFICANT CHANGE UP (ref 6–8.3)
PROT SERPL-MCNC: 6.7 G/DL — SIGNIFICANT CHANGE UP (ref 6–8.3)
RBC # BLD: 4.2 M/UL — SIGNIFICANT CHANGE UP (ref 4.2–5.8)
RBC # BLD: 4.2 M/UL — SIGNIFICANT CHANGE UP (ref 4.2–5.8)
RBC # FLD: 13.1 % — SIGNIFICANT CHANGE UP (ref 10.3–14.5)
RBC # FLD: 13.1 % — SIGNIFICANT CHANGE UP (ref 10.3–14.5)
SODIUM SERPL-SCNC: 137 MMOL/L — SIGNIFICANT CHANGE UP (ref 135–145)
SODIUM SERPL-SCNC: 137 MMOL/L — SIGNIFICANT CHANGE UP (ref 135–145)
WBC # BLD: 8.26 K/UL — SIGNIFICANT CHANGE UP (ref 3.8–10.5)
WBC # BLD: 8.26 K/UL — SIGNIFICANT CHANGE UP (ref 3.8–10.5)
WBC # FLD AUTO: 8.26 K/UL — SIGNIFICANT CHANGE UP (ref 3.8–10.5)
WBC # FLD AUTO: 8.26 K/UL — SIGNIFICANT CHANGE UP (ref 3.8–10.5)

## 2023-11-02 PROCEDURE — 99232 SBSQ HOSP IP/OBS MODERATE 35: CPT

## 2023-11-02 PROCEDURE — 99223 1ST HOSP IP/OBS HIGH 75: CPT

## 2023-11-02 RX ADMIN — POLYETHYLENE GLYCOL 3350 17 GRAM(S): 17 POWDER, FOR SOLUTION ORAL at 18:23

## 2023-11-02 RX ADMIN — ROTIGOTINE 1 PATCH: 8 PATCH, EXTENDED RELEASE TRANSDERMAL at 20:04

## 2023-11-02 RX ADMIN — GABAPENTIN 100 MILLIGRAM(S): 400 CAPSULE ORAL at 12:03

## 2023-11-02 RX ADMIN — ROTIGOTINE 1 PATCH: 8 PATCH, EXTENDED RELEASE TRANSDERMAL at 12:00

## 2023-11-02 RX ADMIN — ROTIGOTINE 1 PATCH: 8 PATCH, EXTENDED RELEASE TRANSDERMAL at 16:12

## 2023-11-02 RX ADMIN — CARBIDOPA AND LEVODOPA 3 CAPSULE(S): 25; 100 TABLET ORAL at 08:31

## 2023-11-02 RX ADMIN — Medication 500 MILLIGRAM(S): at 18:23

## 2023-11-02 RX ADMIN — CARBIDOPA AND LEVODOPA 3 CAPSULE(S): 25; 100 TABLET ORAL at 12:03

## 2023-11-02 RX ADMIN — ROTIGOTINE 1 PATCH: 8 PATCH, EXTENDED RELEASE TRANSDERMAL at 07:00

## 2023-11-02 RX ADMIN — FAMOTIDINE 20 MILLIGRAM(S): 10 INJECTION INTRAVENOUS at 12:04

## 2023-11-02 RX ADMIN — SENNA PLUS 2 TABLET(S): 8.6 TABLET ORAL at 20:02

## 2023-11-02 RX ADMIN — Medication 1 APPLICATION(S): at 18:23

## 2023-11-02 RX ADMIN — GABAPENTIN 100 MILLIGRAM(S): 400 CAPSULE ORAL at 20:02

## 2023-11-02 RX ADMIN — ESCITALOPRAM OXALATE 20 MILLIGRAM(S): 10 TABLET, FILM COATED ORAL at 12:04

## 2023-11-02 RX ADMIN — CARBIDOPA AND LEVODOPA 3 CAPSULE(S): 25; 100 TABLET ORAL at 16:12

## 2023-11-02 RX ADMIN — Medication 1 APPLICATION(S): at 12:03

## 2023-11-02 RX ADMIN — CARBIDOPA AND LEVODOPA 3 CAPSULE(S): 25; 100 TABLET ORAL at 20:02

## 2023-11-02 RX ADMIN — Medication 500 MILLIGRAM(S): at 05:35

## 2023-11-02 NOTE — PROGRESS NOTE ADULT - ASSESSMENT
62yo M with history of severe Parkinson's disease (dx in 2015), HTN, anxiety, who presented to [facility] 10/25 for DBS, now s/p Stage 1 and Stage 2 DBS. Hospital course complicated by bronchospasm requiring overnight observation postoperatively, also now with dysphagia. Admitted to [place] acute inpatient rehab on 10/31 for ADL, gait, and functional impairments- pt/ot/dvt ppx    #Rigidity/Bradykinesia/wearing off  -S/p Stage 1 Deep Brain Surgery Bilateral Implantation Into Globus Pallidus Internus With Leksell Frame on 10/25/23    -S/p Stage 2 on 10/30/23 - monitored overnight for hypoxia 2/2 bronchospasm  -Continue Keflex 500mg BID for post-op ppx x7 days  -Continue Rytary   -Continue Neupro patch     #Mood/anxiety  -Continue Lexapro   -Continue Clonazepam       #Orthostatic hypotension  -Monitor OH vital signs    #Pain control  - Tylenol PRN  - Lidocaine patches to b/l knees  -Continue gabapentin    #GI/Bowel Management/Constipation  - Continue Senna at bedtime   - Miralax BID  - Bisacodyl PRN  - Pepcid       #DVT Prophylaxis  - TEDs   - no pharmacologic ppx given recent surgery      will follow  d/w rehab team

## 2023-11-02 NOTE — CONSULT NOTE ADULT - SUBJECTIVE AND OBJECTIVE BOX
62 yo M with history of severe Parkinson's disease (dx in 2015), HTN, anxiety, who presented to [facility] 10/25 for DBS, now s/p Stage 1 and Stage 2 DBS.He was diagnosed with Parkinson's disease in 2015 when he was having dragging of left leg, and difficulty moving. He was started on levodopa and it worked well for a few years. He now has B/L stiffness, and slowness, but no tremor. He is also experiencing neck discomfort and tilting of his neck forward. He has severe on/off motor fluctuations. He has sudden severe wearing offs of medications, and effects of medication lasts 1-2 hours then he has severe stiffness and slowness. He moves his neck better when he takes the medication. He experiencing freezing of gait, and he can't speak or think at those times. He walks with walker most of the time, and he can usually do his own bathing/dressing/toileting/feeding most days, but sometimes needs help. He is s/p Stage 1 Deep Brain Surgery Bilateral Implantation Into Globus Pallidus Internus With Leksell Frame on 10/25/23 and plan for Stage 2 on 10/30/23. He tolerated procedure well. He was evaluated for admission to acute inpatient rehab and admitted to [facility] on 10/27/23. Subsequently transferred to [facility] for Stage 2 DBS 10/30. Post-operatively, he became hypoxic and required monitoring overnight due to bronchospasm. Patient was evaluated by PM&R and therapy for functional deficits and gait/ ADL impairments and recommended acute rehabilitation. Patient was medically optimized for discharge to [facility] on 10/31.     seen at the bedside, no new complaints overnight, no n/v, no sob.     Review of Systems: per hpi    Allergies and Intolerances:        Allergies:  	No Known Allergies:     Home Medications:   * Patient Currently Takes Medications as of 31-Oct-2023 09:38 documented in Structured Notes  · 	erythromycin 0.5% ophthalmic ointment: 1 application to each affected eye 4 times a day  · 	senna leaf extract oral tablet: 2 tab(s) orally once a day (at bedtime)  · 	polyethylene glycol 3350 oral powder for reconstitution: 17 gram(s) orally 2 times a day  · 	bisacodyl 5 mg oral delayed release tablet: 1 tab(s) orally once a day As needed Constipation  · 	lidocaine 4% topical film: Apply topically to affected area once a day  · 	carbidopa-levodopa 36.25 mg-145 mg oral capsule, extended release: 3 cap(s) orally 3 times a day  · 	rotigotine 3 mg/24 hr transdermal film, extended release: 1 patch transdermal every 24 hours  · 	famotidine 20 mg oral tablet: 1 tab(s) orally once a day  · 	clonazePAM 0.5 mg oral tablet: 1 tab(s) orally 2 times a day as needed for  anxiety  · 	gabapentin 100 mg oral tablet: orally once a day (at bedtime)  · 	gabapentin 100 mg oral tablet: orally once a day  · 	Keflex 500 mg oral capsule: 1 cap(s) orally 2 times a day x 7 days  · 	escitalopram 20 mg oral tablet: 1 tab(s) orally once a day      PAST MEDICAL HISTORY:  Anxiety and depression     Parkinson disease.     PAST SURGICAL HISTORY:  Status post Mohs surgery.      SOCIAL HISTORY  Smoking - Denied  EtOH - Denied   Drugs - Denied      Physical Exam:     Vital Signs Last 24 Hrs  T(C): 36.7 (31 Oct 2023 20:10), Max: 36.7 (31 Oct 2023 15:30)  T(F): 98.1 (31 Oct 2023 20:10), Max: 98.1 (31 Oct 2023 15:30)  HR: 62 (31 Oct 2023 20:10) (62 - 80)  BP: 153/90 (31 Oct 2023 20:10) (125/71 - 158/93)  BP(mean): 92 (31 Oct 2023 15:30) (92 - 117)  RR: 16 (31 Oct 2023 20:10) (16 - 18)  SpO2: 95% (31 Oct 2023 20:10) (95% - 100%)    Parameters below as of 31 Oct 2023 20:10  Patient On (Oxygen Delivery Method): room air      GENERAL- NAD  EAR/NOSE/MOUTH/THROAT -   MMM, scalp incision intact  EYES- SOUTH, conjunctiva and Sclera clear  NECK- supple  RESPIRATORY-  clear to auscultation bilaterally, non laboured breathing  CARDIOVASCULAR - SIS2, RRR  GI - soft NT BS present  EXTREMITIES- no pedal edema  NEUROLOGY- no new gross focal motor deficits, dysarthria+  PSYCHIATRY- AAO X 1       Labs and Results:                12.5                 136  | 28   | 8            8.33  >-----------< 310     ------------------------< 81                    39.2                 3.8  | 99   | 0.55                                         Ca 8.8   Mg x     Ph x      Urinalysis Basic - ( 01 Nov 2023 05:18 )    Color: x / Appearance: x / SG: x / pH: x  Gluc: 81 mg/dL / Ketone: x  / Bili: x / Urobili: x   Blood: x / Protein: x / Nitrite: x   Leuk Esterase: x / RBC: x / WBC x   Sq Epi: x / Non Sq Epi: x / Bacteria: x      Labs reviewed:     CXR personally reviewed: < from: Xray Chest 1 View- PORTABLE-Urgent (10.30.23 @ 13:24) >  IMPRESSION:  Left infiltrate.    < end of copied text >      ECG reviewed and interpreted: < from: 12 Lead ECG (10.28.23 @ 22:42) >  Ventricular Rate 63 BPM    Atrial Rate 63 BPM    P-R Interval 164 ms    QRS Duration 104 ms    Q-T Interval 434 ms    QTC Calculation(Bazett) 444 ms    P Axis 45 degrees    R Axis -21 degrees    T Axis -16 degrees    Diagnosis Line Sinus rhythm with occasional premature ventricular complexes  Incomplete right bundle branch block  Nonspecific ST and T wave abnormality  Abnormal ECG  No previous ECGs available    < end of copied text >      < from: CT Head No Cont (10.26.23 @ 01:44) >    FINDINGS:  VENTRICLES AND SULCI:  Age-appropriate involutional change  INTRA-AXIAL:  Presence of a deep brain stimulator whose tip appears to be   in the left thalamic capsular region clinical correlation is recommended..  EXTRA-AXIAL:  No mass or collection is seen.  VISUALIZED SINUSES:  Clear.  VISUALIZED MASTOIDS:  Clear.  CALVARIUM:  Left frontal marie hole  MISCELLANEOUS:  None.    IMPRESSION:  Deep brain stimulator with tip in the left thalamic capsular   region. Clinical correlation is necessary      MEDICATIONS  (STANDING):  carbidopa 36.25 mG/levodopa 145 mG ER 3 Capsule(s) Oral <User Schedule>  cephalexin 500 milliGRAM(s) Oral every 12 hours  erythromycin   Ointment 1 Application(s) Both EYES four times a day  escitalopram 20 milliGRAM(s) Oral daily  famotidine    Tablet 20 milliGRAM(s) Oral daily  gabapentin 100 milliGRAM(s) Oral at bedtime  gabapentin 100 milliGRAM(s) Oral daily  lidocaine   4% Patch 2 Patch Transdermal <User Schedule>  polyethylene glycol 3350 17 Gram(s) Oral two times a day  rotigotine patch 3 mG/24 Hr(s) 1 Patch Transdermal <User Schedule>  senna 2 Tablet(s) Oral at bedtime    MEDICATIONS  (PRN):  acetaminophen     Tablet .. 650 milliGRAM(s) Oral every 6 hours PRN Mild Pain (1 - 3)  bisacodyl 5 milliGRAM(s) Oral at bedtime PRN Constipation  clonazePAM  Tablet 0.5 milliGRAM(s) Oral two times a day PRN for anxiety
Movement Disorders Neurology  Consult Note    HPI:    Patient is a 64yo M with history of severe Parkinson's disease (dx in 2015), HTN, anxiety, who presented to [facility] 10/25 for DBS, now s/p Stage 1 and Stage 2 DBS. The patient follows with Dr. Gil as an outpatient.     He was diagnosed with Parkinson's disease in 2015 when he was having dragging of left leg, and difficulty moving. He was started on levodopa and it worked well for a few years. He now has B/L stiffness, and slowness, but no tremor. He has severe on/off motor fluctuations. He has sudden severe wearing offs of medications, and effects of medication lasts 1-2 hours then he has severe stiffness and slowness. He experiencing freezing of gait, and he can't speak or think at those times, but has not fallen.     His wife describes an episode where he suddenly went OFF and was gripping the walker tightly. She tries to tell him what to do to be safe, but states that he does his own thing. She denies him being impulsive.    He walks with walker most of the time, and he can usually do his own bathing/dressing/toileting/feeding most days, but sometimes needs help. The patient's wife is the primary caregiver, but also works.     After Stage II, post-operatively, he became hypoxic and required monitoring overnight due to bronchospasm. Patient was evaluated by PM&R and therapy for functional deficits and gait/ ADL impairments and recommended acute rehabilitation. Patient was medically optimized for discharge to [facility] on 10/31.    The patient's wife denies a history of constipation.   She states sometimes he gets confused  She denies a history of talking in sleep or acting out dreams.   No dizziness/lightheadedness when standing.   He has slight tremors in the right hand but tremors are not his predominant issue  They tried increasing his Rytary to 4 capsules 4 times a day but he did not do well with this and became more confused.   His wife notes that sometimes she gives him an extra capsule as needed    Current PD meds:  Lexapro 20mg daily  Gabapentin 100mg daily and at bedtime  Neupro 3mg daily  Rytary 145mg 3 capsules at [id number]  Clonazepam 0.5mg BID PRN  Dulxolax, Miralax, Senna    Physical examination:  Patient is lethargic but arousable and participates in examination. Follows simple commands.  Face is moderately masked. Voice is moderate-severely hypophonic  No tremors  Moderate rigidity bilaterally.  Moderate bradykinesia, left more than right  Gait deferred

## 2023-11-02 NOTE — PROGRESS NOTE ADULT - SUBJECTIVE AND OBJECTIVE BOX
CHIEF COMPLAINT: no new complaints, no acute overnight events       HISTORY OF PRESENT ILLNESS    Patient is a 64yo M with history of severe Parkinson's disease (dx in 2015), HTN, anxiety, who presented to [facility] 10/25 for DBS, now s/p Stage 1 and Stage 2 DBS.He was diagnosed with Parkinson's disease in 2015 when he was having dragging of left leg, and difficulty moving. He was started on levodopa and it worked well for a few years. He now has B/L stiffness, and slowness, but no tremor. He is also experiencing neck discomfort and tilting of his neck forward. He has severe on/off motor fluctuations. He has sudden severe wearing offs of medications, and effects of medication lasts 1-2 hours then he has severe stiffness and slowness. He moves his neck better when he takes the medication. He experiencing freezing of gait, and he can't speak or think at those times. He walks with walker most of the time, and he can usually do his own bathing/dressing/toileting/feeding most days, but sometimes needs help. He is s/p Stage 1 Deep Brain Surgery Bilateral Implantation Into Globus Pallidus Internus With Leksell Frame on 10/25/23 and plan for Stage 2 on 10/30/23. He tolerated procedure well. He was evaluated for admission to acute inpatient rehab and admitted to [facility] on 10/27/23. Subsequently transferred to [facility] for Stage 2 DBS 10/30. Post-operatively, he became hypoxic and required monitoring overnight due to bronchospasm. Patient was evaluated by PM&R and therapy for functional deficits and gait/ ADL impairments and recommended acute rehabilitation. Patient was medically optimized for discharge to [facility] on 10/31.  (31 Oct 2023 15:41)        PAST MEDICAL & SURGICAL HISTORY:  Parkinson disease      Anxiety and depression      Status post Mohs surgery      VITALS  Vital Signs Last 24 Hrs  T(C): 36.9 (02 Nov 2023 08:43), Max: 36.9 (02 Nov 2023 08:43)  T(F): 98.5 (02 Nov 2023 08:43), Max: 98.5 (02 Nov 2023 08:43)  HR: 88 (02 Nov 2023 08:43) (76 - 88)  BP: 137/75 (02 Nov 2023 08:43) (137/75 - 156/91)  BP(mean): --  RR: 16 (02 Nov 2023 08:43) (16 - 16)  SpO2: 98% (02 Nov 2023 08:43) (98% - 98%)    Parameters below as of 02 Nov 2023 08:43  Patient On (Oxygen Delivery Method): room air          PHYSICAL EXAM  Constitutional - NAD, Comfortable  HEENT - NCAT, EOMI  Neck - Supple, No limited ROM  Chest - CTA bilaterally  Cardiovascular - RRR, S1S2  Abdomen - Soft, NTND  Extremities -  No calf tenderness   Neurologic Exam - no new focal deficits                          RECENT LABS                        12.9   8.26  )-----------( 347      ( 02 Nov 2023 06:18 )             39.5     11-02    137  |  99  |  14  ----------------------------<  97  3.4<L>   |  28  |  0.67    Ca    9.0      02 Nov 2023 06:18    TPro  6.7  /  Alb  3.1<L>  /  TBili  0.7  /  DBili  x   /  AST  12  /  ALT  8<L>  /  AlkPhos  74  11-02      LIVER FUNCTIONS - ( 02 Nov 2023 06:18 )  Alb: 3.1 g/dL / Pro: 6.7 g/dL / ALK PHOS: 74 U/L / ALT: 8 U/L / AST: 12 U/L / GGT: x           Urinalysis Basic - ( 02 Nov 2023 06:18 )    Color: x / Appearance: x / SG: x / pH: x  Gluc: 97 mg/dL / Ketone: x  / Bili: x / Urobili: x   Blood: x / Protein: x / Nitrite: x   Leuk Esterase: x / RBC: x / WBC x   Sq Epi: x / Non Sq Epi: x / Bacteria: x                  Urinalysis Basic - ( 02 Nov 2023 06:18 )    Color: x / Appearance: x / SG: x / pH: x  Gluc: 97 mg/dL / Ketone: x  / Bili: x / Urobili: x   Blood: x / Protein: x / Nitrite: x   Leuk Esterase: x / RBC: x / WBC x   Sq Epi: x / Non Sq Epi: x / Bacteria: x        CURRENT MEDICATIONS  MEDICATIONS  (STANDING):  carbidopa 36.25 mG/levodopa 145 mG ER 3 Capsule(s) Oral <User Schedule>  cephalexin 500 milliGRAM(s) Oral every 12 hours  erythromycin   Ointment 1 Application(s) Both EYES four times a day  escitalopram 20 milliGRAM(s) Oral daily  famotidine    Tablet 20 milliGRAM(s) Oral daily  gabapentin 100 milliGRAM(s) Oral at bedtime  gabapentin 100 milliGRAM(s) Oral daily  lidocaine   4% Patch 2 Patch Transdermal <User Schedule>  polyethylene glycol 3350 17 Gram(s) Oral two times a day  rotigotine patch 3 mG/24 Hr(s) 1 Patch Transdermal <User Schedule>  senna 2 Tablet(s) Oral at bedtime    MEDICATIONS  (PRN):  acetaminophen     Tablet .. 650 milliGRAM(s) Oral every 6 hours PRN Mild Pain (1 - 3)  bisacodyl 5 milliGRAM(s) Oral at bedtime PRN Constipation  clonazePAM  Tablet 0.5 milliGRAM(s) Oral two times a day PRN for anxiety

## 2023-11-02 NOTE — PROGRESS NOTE ADULT - ASSESSMENT
Patient is a 64yo M with history of severe Parkinson's disease (dx in 2015), HTN, anxiety, who presented to [facility] 10/25 for DBS, now s/p Stage 1 and Stage 2 DBS. Hospital course complicated by brochospasm requiring overnight observation postoperatively, also now with dysphagia. Admitted to [place] acute inpatient rehab on 10/31 for ADL, gait, and functional impairments.     #Parkinson's Disease now with gait Instability, ADL impairments and Functional impairments  - Start Comprehensive Rehab Program of PT/OT/SLP    ------------------------------------------------------  Parkinson's related motor symptoms    #Rigidity/Bradykinesia/wearing off  -S/p Stage 1 Deep Brain Surgery Bilateral Implantation Into Globus Pallidus Internus With Leksell Frame on 10/25/23    -S/p Stage 2 on 10/30/23 - monitored overnight for hypoxia 2/2 bronchospasm  -Continue Keflex 500mg BID for post-op ppx x7 days  -Continue Rytary 145mg 3 tabs at 8a, 12pm, 4pm and 8pm [During last visit with  , discussed about increase to 4 cap 4 times a day --> but wife has him on 3 cap 4 times a day] ]  -Continue Neupro patch 3mg /24hours daily [ Home Neupro patch 4mg /24, confirmed with wife]  -Movement disorders following - DBS programming on  Monday next week     --------------------------------------------------------  Parkinson's related non-motor symptoms    #Mood/anxiety  -Continue Lexapro 20mg daily  -Continue Clonazepam 0.5mg BID PRN  Neuropsychology to follow    #Orthostatic hypotension  -Monitor OH vital signs    #Pain control  - Tylenol PRN  - Lidocaine patches to b/l knees  -Continue gabapentin 100mg in am and 200mg at bedtime     #GI/Bowel Management/Constipation  - Continue Senna at bedtime   - Miralax BID  - Bisacodyl PRN  -Pepcid 20mg daily    #Bladder management  - Continue to monitor PVR q 8 hours (SC if > 400)  -Monitor UO    ----------------------------------------------------------  Concurrent medical problems      #DVT Prophylaxis  - TEDs   - pharmacologic ppx contraindicated given recent surgery    #Skin:  -3 surgical sites on top of skull. Clean dry and intact. Staples present. No fluctuance, erythema, drainage, or oozing at time of exam.  -Do not remove dressings   -Can shower 2 day post-op but do not scrub incision    FEN   - Diet - Pureed Diet, Mildly Thick Liquids  [CCHO, DASH/TLC]    - Dysphagia  SLP - evaluation and treatment    Precautions / PROPHYLAXIS:   - Falls  - ortho: Weight bearing status: WBAT   - Lungs: Aspiration, Incentive Spirometer   - Pressure injury/Skin: Turn Q2hrs while in bed, OOB to Chair, PT/OT

## 2023-11-02 NOTE — CONSULT NOTE ADULT - ASSESSMENT
64yo M with history of severe Parkinson's disease (dx in 2015), HTN, anxiety, who presented to [facility] 10/25 for DBS, now s/p Stage 1 and Stage 2 DBS. Hospital course complicated by brochospasm requiring overnight observation postoperatively, also now with dysphagia. Admitted to [place] acute inpatient rehab on 10/31 for ADL, gait, and functional impairments- pt/ot/dvt ppx      #Rigidity/Bradykinesia/wearing off  -S/p Stage 1 Deep Brain Surgery Bilateral Implantation Into Globus Pallidus Internus With Leksell Frame on 10/25/23    -S/p Stage 2 on 10/30/23 - monitored overnight for hypoxia 2/2 bronchospasm  -Continue Keflex 500mg BID for post-op ppx x7 days  -Continue Rytary 145mg 3 tabs at 8a, 12pm, 4pm and 8pm  -Continue Neupro patch 3mg /24hours daily    #Mood/anxiety  -Continue Lexapro   -Continue Clonazepam       #Orthostatic hypotension  -Monitor OH vital signs    #Pain control  - Tylenol PRN  - Lidocaine patches to b/l knees  -Continue gabapentin    #GI/Bowel Management/Constipation  - Continue Senna at bedtime   - Miralax BID  - Bisacodyl PRN  - Pepcid       #DVT Prophylaxis  - TEDs   - no pharmacologic ppx given recent surgery    time spent in e/m visit- 81 min, all charts labs imaging reviewed.
Patient is a 62yo M with history of severe Parkinson's disease (dx in 2015), HTN, anxiety, who presented to [facility] 10/25 for DBS, now s/p Stage 1 and Stage 2 DBS. The patient follows with Dr. Gil as an outpatient.     The patient's history is consistent with parkinsonism.   Plan to program his DBS stimulator on 11/6/23    Recommendations:  Continue current medication regimen. Will likely adjust Rytary once patient's stimulator is on  Bowel movements titrated to 1 bowel movement daily  Monitor for OH  Monitor for hallucinations  Will consider Rivastigmine for cognition  Continue PT/OT/SLP as part of inpatient PD program    Case was discussed in detail with the patient's wife, patient and primary team.  Movement Disorders Neurology will continue to follow this patient.

## 2023-11-02 NOTE — PROGRESS NOTE ADULT - SUBJECTIVE AND OBJECTIVE BOX
62 yo M with history of severe Parkinson's disease (dx in 2015), HTN, anxiety, who presented to [facility] 10/25 for DBS, now s/p Stage 1 and Stage 2 DBS.He was diagnosed with Parkinson's disease in 2015 when he was having dragging of left leg, and difficulty moving. He was started on levodopa and it worked well for a few years. He now has B/L stiffness, and slowness, but no tremor. He is also experiencing neck discomfort and tilting of his neck forward. He has severe on/off motor fluctuations. He has sudden severe wearing offs of medications, and effects of medication lasts 1-2 hours then he has severe stiffness and slowness. He moves his neck better when he takes the medication. He experiencing freezing of gait, and he can't speak or think at those times. He walks with walker most of the time, and he can usually do his own bathing/dressing/toileting/feeding most days, but sometimes needs help. He is s/p Stage 1 Deep Brain Surgery Bilateral Implantation Into Globus Pallidus Internus With Leksell Frame on 10/25/23 and plan for Stage 2 on 10/30/23. He tolerated procedure well. He was evaluated for admission to acute inpatient rehab and admitted to [facility] on 10/27/23. Subsequently transferred to [facility] for Stage 2 DBS 10/30. Post-operatively, he became hypoxic and required monitoring overnight due to bronchospasm. Patient was evaluated by PM&R and therapy for functional deficits and gait/ ADL impairments and recommended acute rehabilitation. Patient was medically optimized for discharge to [facility] on 10/31.     seen at the bedside, no new complaints overnight, no n/v, no sob.     Vital Signs Last 24 Hrs  T(C): 36.9 (02 Nov 2023 08:43), Max: 36.9 (02 Nov 2023 08:43)  T(F): 98.5 (02 Nov 2023 08:43), Max: 98.5 (02 Nov 2023 08:43)  HR: 88 (02 Nov 2023 08:43) (76 - 88)  BP: 137/75 (02 Nov 2023 08:43) (137/75 - 156/91)  BP(mean): --  RR: 16 (02 Nov 2023 08:43) (16 - 16)  SpO2: 98% (02 Nov 2023 08:43) (98% - 98%)    Parameters below as of 02 Nov 2023 08:43  Patient On (Oxygen Delivery Method): room air      GENERAL- NAD  EAR/NOSE/MOUTH/THROAT -   MMM, scalp incision intact  EYES- SOUTH, conjunctiva and Sclera clear  NECK- supple  RESPIRATORY-  clear to auscultation bilaterally, non laboured breathing  CARDIOVASCULAR - SIS2, RRR  GI - soft NT BS present  EXTREMITIES- no pedal edema  NEUROLOGY- no new gross focal motor deficits, dysarthria+                    12.9                 137  | 28   | 14           8.26  >-----------< 347     ------------------------< 97                    39.5                 3.4  | 99   | 0.67                                         Ca 9.0   Mg x     Ph x            MEDICATIONS  (STANDING):  carbidopa 36.25 mG/levodopa 145 mG ER 3 Capsule(s) Oral <User Schedule>  cephalexin 500 milliGRAM(s) Oral every 12 hours  erythromycin   Ointment 1 Application(s) Both EYES four times a day  escitalopram 20 milliGRAM(s) Oral daily  famotidine    Tablet 20 milliGRAM(s) Oral daily  gabapentin 100 milliGRAM(s) Oral at bedtime  gabapentin 100 milliGRAM(s) Oral daily  lidocaine   4% Patch 2 Patch Transdermal <User Schedule>  polyethylene glycol 3350 17 Gram(s) Oral two times a day  rotigotine patch 3 mG/24 Hr(s) 1 Patch Transdermal <User Schedule>  senna 2 Tablet(s) Oral at bedtime    MEDICATIONS  (PRN):  acetaminophen     Tablet .. 650 milliGRAM(s) Oral every 6 hours PRN Mild Pain (1 - 3)  bisacodyl 5 milliGRAM(s) Oral at bedtime PRN Constipation  clonazePAM  Tablet 0.5 milliGRAM(s) Oral two times a day PRN for anxiety

## 2023-11-02 NOTE — CHART NOTE - NSCHARTNOTEFT_GEN_A_CORE
REHABILITATION DIAGNOSIS/IMPAIRMENT GROUP CODE:  Parkinson's disease   s/p DBS placement     COMORBIDITIES/COMPLICATING CONDITIONS IMPACTING REHABILITATION:  HEALTH ISSUES - PROBLEM Dx:        PAST MEDICAL & SURGICAL HISTORY:  Parkinson disease      Anxiety and depression      Status post Mohs surgery          Based upon consideration of the patient's impairments, functional status, complicating conditions and any other contributing factors and after information garnered from the assessments of all therapy disciplines involved in treating the patient and other pertinent clinicians:    INTERDISCIPLINARY REHABILITATION INTERVENTIONS:    [ X  ] Transfer Training  [X   ] Bed Mobility  [ X  ] Therapeutic Exercise  [ X  ] Balance/Coordination Exercises  [ X  ] Locomotion retraining  [ X  ] Stairs  [ X  ] Functional Transfer Training  [ X  ] Bowel/Bladder program  [  X ] Pain Management  [ X  ] Skin/Wound Care  [X   ] Visual/Perceptual Training  [X   ] Therapeutic Recreation Activities  [  X ] Neuromuscular Re-education  [  X ] Activities of Daily Living  [ X  ] Speech Exercise  [   ] Swallowing Exercises  [   ] Vital Stim  [   ] Dietary Supplements  [ X  ] Calorie Count  [X   ] Cognitive Exercises  [  X ] Congnitive/Linguistic Treatment  [   ] Behavior Program  [  X ] Neuropsych Therapy  [ X  ] Patient/Family Counseling  [ X  ] Family Training  [ X  ] Community Re-entry  [   ] Orthotic Evaluation  [   ] Prosthetic Eval/Training    MEDICAL PROGNOSIS:  Good     REHAB POTENTIAL:  Good     EXPECTED DAILY THERAPY:         PT: 1hr/day       OT: 1hr/day       ST: 1hr/day        EXPECTED INTENSITY OF PROGRAM:  3 hrs/day    EXPECTED FREQUENCY OF PROGRAM:  5 days/week    ESTIMATED LOS:  10-14 days    ESTIMATED DISPOSITION:  home    INTERDISCIPLINARY FUNCTIONAL OUTCOMES/GOALS:           Gait/Mobility: Mod I       Transfers: Supervision       ADLs: Supervision       Functional Transfers: Min Assist       Medication Management: Independent       Communication: Supervision       Cognitive: Min Assist       Dysphagia: Supervision       Bladder: Supervision       Bowel: Supervision

## 2023-11-02 NOTE — DISCHARGE NOTE PROVIDER - DISCHARGE SERVICE FOR PATIENT
on the discharge service for the patient. I have reviewed and made amendments to the documentation where necessary.
normal...

## 2023-11-03 PROCEDURE — 99232 SBSQ HOSP IP/OBS MODERATE 35: CPT | Mod: GC

## 2023-11-03 PROCEDURE — 99232 SBSQ HOSP IP/OBS MODERATE 35: CPT

## 2023-11-03 RX ADMIN — Medication 1 APPLICATION(S): at 12:24

## 2023-11-03 RX ADMIN — Medication 5 MILLIGRAM(S): at 16:17

## 2023-11-03 RX ADMIN — ROTIGOTINE 1 PATCH: 8 PATCH, EXTENDED RELEASE TRANSDERMAL at 19:44

## 2023-11-03 RX ADMIN — ROTIGOTINE 1 PATCH: 8 PATCH, EXTENDED RELEASE TRANSDERMAL at 07:21

## 2023-11-03 RX ADMIN — POLYETHYLENE GLYCOL 3350 17 GRAM(S): 17 POWDER, FOR SOLUTION ORAL at 17:47

## 2023-11-03 RX ADMIN — SENNA PLUS 2 TABLET(S): 8.6 TABLET ORAL at 21:12

## 2023-11-03 RX ADMIN — Medication 1 APPLICATION(S): at 17:47

## 2023-11-03 RX ADMIN — ESCITALOPRAM OXALATE 20 MILLIGRAM(S): 10 TABLET, FILM COATED ORAL at 12:24

## 2023-11-03 RX ADMIN — POLYETHYLENE GLYCOL 3350 17 GRAM(S): 17 POWDER, FOR SOLUTION ORAL at 05:25

## 2023-11-03 RX ADMIN — Medication 500 MILLIGRAM(S): at 17:47

## 2023-11-03 RX ADMIN — CARBIDOPA AND LEVODOPA 3 CAPSULE(S): 25; 100 TABLET ORAL at 16:17

## 2023-11-03 RX ADMIN — CARBIDOPA AND LEVODOPA 3 CAPSULE(S): 25; 100 TABLET ORAL at 08:19

## 2023-11-03 RX ADMIN — Medication 1 APPLICATION(S): at 05:25

## 2023-11-03 RX ADMIN — GABAPENTIN 100 MILLIGRAM(S): 400 CAPSULE ORAL at 21:12

## 2023-11-03 RX ADMIN — FAMOTIDINE 20 MILLIGRAM(S): 10 INJECTION INTRAVENOUS at 12:24

## 2023-11-03 RX ADMIN — CARBIDOPA AND LEVODOPA 3 CAPSULE(S): 25; 100 TABLET ORAL at 12:23

## 2023-11-03 RX ADMIN — ROTIGOTINE 1 PATCH: 8 PATCH, EXTENDED RELEASE TRANSDERMAL at 16:17

## 2023-11-03 RX ADMIN — GABAPENTIN 100 MILLIGRAM(S): 400 CAPSULE ORAL at 12:24

## 2023-11-03 RX ADMIN — Medication 650 MILLIGRAM(S): at 19:33

## 2023-11-03 RX ADMIN — CARBIDOPA AND LEVODOPA 3 CAPSULE(S): 25; 100 TABLET ORAL at 20:07

## 2023-11-03 RX ADMIN — Medication 650 MILLIGRAM(S): at 18:33

## 2023-11-03 RX ADMIN — Medication 500 MILLIGRAM(S): at 05:25

## 2023-11-03 NOTE — PROGRESS NOTE ADULT - SUBJECTIVE AND OBJECTIVE BOX
Patient is a 63y old  Male who presents with a chief complaint of Parkinson's Disease s/p DBS (02 Nov 2023 12:27)    Patient seen and examined at bedside. No acute overnight events. +constipated     ALLERGIES:  No Known Allergies    MEDICATIONS  (STANDING):  carbidopa 36.25 mG/levodopa 145 mG ER 3 Capsule(s) Oral <User Schedule>  cephalexin 500 milliGRAM(s) Oral every 12 hours  erythromycin   Ointment 1 Application(s) Both EYES four times a day  escitalopram 20 milliGRAM(s) Oral daily  famotidine    Tablet 20 milliGRAM(s) Oral daily  gabapentin 100 milliGRAM(s) Oral daily  gabapentin 100 milliGRAM(s) Oral at bedtime  lidocaine   4% Patch 2 Patch Transdermal <User Schedule>  polyethylene glycol 3350 17 Gram(s) Oral two times a day  rotigotine patch 3 mG/24 Hr(s) 1 Patch Transdermal <User Schedule>  senna 2 Tablet(s) Oral at bedtime    MEDICATIONS  (PRN):  acetaminophen     Tablet .. 650 milliGRAM(s) Oral every 6 hours PRN Mild Pain (1 - 3)  bisacodyl 5 milliGRAM(s) Oral at bedtime PRN Constipation  clonazePAM  Tablet 0.5 milliGRAM(s) Oral two times a day PRN for anxiety    Vital Signs Last 24 Hrs  T(F): 98.2 (03 Nov 2023 07:16), Max: 98.2 (02 Nov 2023 19:58)  HR: 72 (03 Nov 2023 07:16) (72 - 77)  BP: 153/96 (03 Nov 2023 07:16) (132/81 - 153/96)  RR: 16 (03 Nov 2023 07:16) (16 - 16)  SpO2: 96% (03 Nov 2023 07:16) (96% - 97%)  I&O's Summary    BMI (kg/m2): 27.9 (10-31-23 @ 18:01), 30.1 (10-30-23 @ 11:24)    PHYSICAL EXAM:  General: NAD, awake, alert +dysarthria   ENT: MMM, no tonsilar exudate  Neck: Supple, No JVD  Lungs: Clear to auscultation bilaterally, no wheezes. Good air entry bilaterally   Cardio: RRR, S1/S2, No murmurs  Abdomen: Soft, Nontender, Nondistended; Bowel sounds present  Extremities: No calf tenderness, No pitting edema    LABS:                        12.9   8.26  )-----------( 347      ( 02 Nov 2023 06:18 )             39.5       11-02    137  |  99  |  14  ----------------------------<  97  3.4   |  28  |  0.67    Ca    9.0      02 Nov 2023 06:18    TPro  6.7  /  Alb  3.1  /  TBili  0.7  /  DBili  x   /  AST  12  /  ALT  8   /  AlkPhos  74  11-02     Urinalysis Basic - ( 02 Nov 2023 06:18 )    Color: x / Appearance: x / SG: x / pH: x  Gluc: 97 mg/dL / Ketone: x  / Bili: x / Urobili: x   Blood: x / Protein: x / Nitrite: x   Leuk Esterase: x / RBC: x / WBC x   Sq Epi: x / Non Sq Epi: x / Bacteria: x    COVID-19 PCR: Ezekiel (10-27-23 @ 19:57)    RADIOLOGY & ADDITIONAL TESTS:     Care Discussed with Consultants/Other Providers:

## 2023-11-03 NOTE — PROGRESS NOTE ADULT - ASSESSMENT
Patient is a 62yo M with history of severe Parkinson's disease (dx in 2015), HTN, anxiety, who presented to [facility] 10/25 for DBS, now s/p Stage 1 and Stage 2 DBS. The patient follows with Dr. Gil as an outpatient.     The patient's history is consistent with parkinsonism. He has been participating well in therapy.   L DBS placed with IPG.  Plan to program his DBS stimulator on 11/6/23    Recommendations:  - Continue Rytary 145mg 3 capsules QID at [id number]. Will likely adjust Rytary once patient's stimulator is on  - Continue Lexapro 20mg daily  - Continue Gabapentin 100mg BID  - Continue Neupro patch 3mg daily  - Continue Clonazepam 0.5mg BID PRN anxiety  Bowel movements titrated to 1 bowel movement daily. Continue Miralax and Senna.   Monitor for OH  Monitor for hallucinations  Will consider Rivastigmine for cognition  Continue PT/OT/SLP as part of inpatient PD program    Case was discussed in detail with the patient's wife, patient and primary team.  Movement Disorders Neurology will continue to follow this patient.

## 2023-11-03 NOTE — PROGRESS NOTE ADULT - SUBJECTIVE AND OBJECTIVE BOX
Parkinson's disease, S/P DBS      HISTORY OF PRESENT ILLNESS  Patient is a 64yo M with history of severe Parkinson's disease (dx in 2015), HTN, anxiety, who presented to [facility] 10/25 for DBS, now s/p Stage 1 and Stage 2 DBS.He was diagnosed with Parkinson's disease in 2015 when he was having dragging of left leg, and difficulty moving. He was started on levodopa and it worked well for a few years. He now has B/L stiffness, and slowness, but no tremor. He is also experiencing neck discomfort and tilting of his neck forward. He has severe on/off motor fluctuations. He has sudden severe wearing offs of medications, and effects of medication lasts 1-2 hours then he has severe stiffness and slowness. He moves his neck better when he takes the medication. He experiencing freezing of gait, and he can't speak or think at those times. He walks with walker most of the time, and he can usually do his own bathing/dressing/toileting/feeding most days, but sometimes needs help. He is s/p Stage 1 Deep Brain Surgery Bilateral Implantation Into Globus Pallidus Internus With Leksell Frame on 10/25/23 and plan for Stage 2 on 10/30/23. He tolerated procedure well. He was evaluated for admission to acute inpatient rehab and admitted to [facility] on 10/27/23. Subsequently transferred to [facility] for Stage 2 DBS 10/30. Post-operatively, he became hypoxic and required monitoring overnight due to bronchospasm. Patient was evaluated by PM&R and therapy for functional deficits and gait/ ADL impairments and recommended acute rehabilitation. Patient was medically optimized for discharge to [facility] on 10/31.      Allergies  No Known Allergies    Subjective:   - Patient was seen and examined at bed side, comfortable, no over night events  - Slept well last night, feels good this am, no new complaints  - Denies pain this am  - GI/, moving bowel though LBM (10/31), voiding without issues   - Tolerating 3 hours of daily therapy and progressing     ROS:  - Denies CP, palpitation, SOB, cough, fever, headache, hematuria, abdominal pain, N/V/D/C, joint pain or swelling     VITALS  Vital Signs Last 24 Hrs  T(C): 36.8 (03 Nov 2023 07:16), Max: 36.8 (02 Nov 2023 19:58)  T(F): 98.2 (03 Nov 2023 07:16), Max: 98.2 (02 Nov 2023 19:58)  HR: 72 (03 Nov 2023 07:16) (72 - 77)  BP: 153/96 (03 Nov 2023 07:16) (132/81 - 153/96)  RR: 16 (03 Nov 2023 07:16) (16 - 16)  SpO2: 96% (03 Nov 2023 07:16) (96% - 97%)    PHYSICAL EXAM  Constitutional - NAD, Comfortable  HEENT - NCAT, EOMI  Neck - Supple, No limited ROM  Chest - CTA bilaterally  Cardiovascular - RRR, S1S2  Abdomen - Soft, NTND  Extremities -  No calf tenderness   Neurologic Exam - no new focal deficits                        RECENT LABS                        12.9   8.26  )-----------( 347      ( 02 Nov 2023 06:18 )             39.5     11-02    137  |  99  |  14  ----------------------------<  97  3.4<L>   |  28  |  0.67    Ca    9.0      02 Nov 2023 06:18    TPro  6.7  /  Alb  3.1<L>  /  TBili  0.7  /  DBili  x   /  AST  12  /  ALT  8<L>  /  AlkPhos  74  11-02      LIVER FUNCTIONS - ( 02 Nov 2023 06:18 )  Alb: 3.1 g/dL / Pro: 6.7 g/dL / ALK PHOS: 74 U/L / ALT: 8 U/L / AST: 12 U/L / GGT: x           MEDICATIONS  (STANDING):  carbidopa 36.25 mG/levodopa 145 mG ER 3 Capsule(s) Oral <User Schedule>  cephalexin 500 milliGRAM(s) Oral every 12 hours  erythromycin   Ointment 1 Application(s) Both EYES four times a day  escitalopram 20 milliGRAM(s) Oral daily  famotidine    Tablet 20 milliGRAM(s) Oral daily  gabapentin 100 milliGRAM(s) Oral at bedtime  gabapentin 100 milliGRAM(s) Oral daily  lidocaine   4% Patch 2 Patch Transdermal <User Schedule>  polyethylene glycol 3350 17 Gram(s) Oral two times a day  rotigotine patch 3 mG/24 Hr(s) 1 Patch Transdermal <User Schedule>  senna 2 Tablet(s) Oral at bedtime    MEDICATIONS  (PRN):  acetaminophen     Tablet .. 650 milliGRAM(s) Oral every 6 hours PRN Mild Pain (1 - 3)  bisacodyl 5 milliGRAM(s) Oral at bedtime PRN Constipation  clonazePAM  Tablet 0.5 milliGRAM(s) Oral two times a day PRN for anxiety     Parkinson's disease, S/P DBS      HISTORY OF PRESENT ILLNESS  Patient is a 62yo M with history of severe Parkinson's disease (dx in 2015), HTN, anxiety, who presented to [facility] 10/25 for DBS, now s/p Stage 1 and Stage 2 DBS.He was diagnosed with Parkinson's disease in 2015 when he was having dragging of left leg, and difficulty moving. He was started on levodopa and it worked well for a few years. He now has B/L stiffness, and slowness, but no tremor. He is also experiencing neck discomfort and tilting of his neck forward. He has severe on/off motor fluctuations. He has sudden severe wearing offs of medications, and effects of medication lasts 1-2 hours then he has severe stiffness and slowness. He moves his neck better when he takes the medication. He experiencing freezing of gait, and he can't speak or think at those times. He walks with walker most of the time, and he can usually do his own bathing/dressing/toileting/feeding most days, but sometimes needs help. He is s/p Stage 1 Deep Brain Surgery Bilateral Implantation Into Globus Pallidus Internus With Leksell Frame on 10/25/23 and plan for Stage 2 on 10/30/23. He tolerated procedure well. He was evaluated for admission to acute inpatient rehab and admitted to [facility] on 10/27/23. Subsequently transferred to [facility] for Stage 2 DBS 10/30. Post-operatively, he became hypoxic and required monitoring overnight due to bronchospasm. Patient was evaluated by PM&R and therapy for functional deficits and gait/ ADL impairments and recommended acute rehabilitation. Patient was medically optimized for discharge to [facility] on 10/31.      Allergies  No Known Allergies    Subjective:   - Patient was seen and examined at bed side, comfortable in bed 3 surgical incisions on his scalp, C/D/I, staples are in place, no over night events  - Slept well last night, feels good this am, no new complaints  - Denies pain or headache this am  - GI/, moving bowel though LBM (10/31), voiding without issues   - Tolerating 3 hours of daily therapy and progressing     ROS:  - Denies CP, palpitation, SOB, cough, fever, headache, hematuria, abdominal pain, N/V/D/C, joint pain or swelling     VITALS  Vital Signs Last 24 Hrs  T(C): 36.8 (03 Nov 2023 07:16), Max: 36.8 (02 Nov 2023 19:58)  T(F): 98.2 (03 Nov 2023 07:16), Max: 98.2 (02 Nov 2023 19:58)  HR: 72 (03 Nov 2023 07:16) (72 - 77)  BP: 153/96 (03 Nov 2023 07:16) (132/81 - 153/96)  RR: 16 (03 Nov 2023 07:16) (16 - 16)  SpO2: 96% (03 Nov 2023 07:16) (96% - 97%)    PHYSICAL EXAM  Constitutional - NAD, Comfortable  HEENT - NCAT, EOMI, MELVINA, incisions are C/D/I, staples are in place  Neck - Supple, No limited ROM  Chest - CTA bilaterally  Cardiovascular - RRR, S1S2  Abdomen - Soft, NTND  Extremities -  No calf tenderness   Neurologic Exam - no new focal deficits                        RECENT LABS                        12.9   8.26  )-----------( 347      ( 02 Nov 2023 06:18 )             39.5     11-02    137  |  99  |  14  ----------------------------<  97  3.4<L>   |  28  |  0.67    Ca    9.0      02 Nov 2023 06:18    TPro  6.7  /  Alb  3.1<L>  /  TBili  0.7  /  DBili  x   /  AST  12  /  ALT  8<L>  /  AlkPhos  74  11-02      LIVER FUNCTIONS - ( 02 Nov 2023 06:18 )  Alb: 3.1 g/dL / Pro: 6.7 g/dL / ALK PHOS: 74 U/L / ALT: 8 U/L / AST: 12 U/L / GGT: x           MEDICATIONS  (STANDING):  carbidopa 36.25 mG/levodopa 145 mG ER 3 Capsule(s) Oral <User Schedule>  cephalexin 500 milliGRAM(s) Oral every 12 hours  erythromycin   Ointment 1 Application(s) Both EYES four times a day  escitalopram 20 milliGRAM(s) Oral daily  famotidine    Tablet 20 milliGRAM(s) Oral daily  gabapentin 100 milliGRAM(s) Oral at bedtime  gabapentin 100 milliGRAM(s) Oral daily  lidocaine   4% Patch 2 Patch Transdermal <User Schedule>  polyethylene glycol 3350 17 Gram(s) Oral two times a day  rotigotine patch 3 mG/24 Hr(s) 1 Patch Transdermal <User Schedule>  senna 2 Tablet(s) Oral at bedtime    MEDICATIONS  (PRN):  acetaminophen     Tablet .. 650 milliGRAM(s) Oral every 6 hours PRN Mild Pain (1 - 3)  bisacodyl 5 milliGRAM(s) Oral at bedtime PRN Constipation  clonazePAM  Tablet 0.5 milliGRAM(s) Oral two times a day PRN for anxiety

## 2023-11-03 NOTE — PROGRESS NOTE ADULT - ASSESSMENT
62yo M with history of severe Parkinson's disease (dx in 2015), HTN, anxiety, who presented to [facility] 10/25 for DBS, now s/p Stage 1 and Stage 2 DBS. Hospital course complicated by bronchospasm requiring overnight observation postoperatively, also now with dysphagia. Admitted to [place] acute inpatient rehab on 10/31 for ADL, gait, and functional impairments- pt/ot/dvt ppx    #Rigidity/Bradykinesia/wearing off  -S/p Stage 1 Deep Brain Surgery Bilateral Implantation Into Globus Pallidus Internus With Leksell Frame on 10/25/23    -S/p Stage 2 on 10/30/23 - monitored overnight for hypoxia 2/2 bronchospasm  -Continue Keflex 500mg BID for post-op ppx x7 days  -Continue Rytary   -Continue Neupro patch     #Mood/anxiety  -Continue Lexapro   -Continue Clonazepam     #Orthostatic hypotension  -Monitor OH vital signs    #Pain control  - Tylenol PRN  - Lidocaine patches to b/l knees  -Continue gabapentin    #GI/Bowel Management/Constipation  - Continue Senna at bedtime   - Miralax BID  - Bisacodyl PRN  - Pepcid       #DVT Prophylaxis  - TEDs   - no pharmacologic ppx given recent surgery

## 2023-11-03 NOTE — PROGRESS NOTE ADULT - ASSESSMENT
Patient is a 62yo M with history of severe Parkinson's disease (dx in 2015), HTN, anxiety, who presented to [facility] 10/25 for DBS, now s/p Stage 1 and Stage 2 DBS. Hospital course complicated by brochospasm requiring overnight observation postoperatively, also now with dysphagia. Admitted to [facility] acute inpatient rehab on 10/31 for ADL, gait, and functional impairments.     #Parkinson's Disease now with gait Instability, ADL impairments and Functional impairments  - Start Comprehensive Rehab Program of PT/OT/SLP      Parkinson's related motor symptoms    #Rigidity/Bradykinesia/wearing off  -S/p Stage 1 Deep Brain Surgery Bilateral Implantation Into Globus Pallidus Internus With Leksell Frame on 10/25/23    -S/p Stage 2 on 10/30/23 - monitored overnight for hypoxia 2/2 bronchospasm  -Continue Keflex 500mg BID for post-op ppx x7 days. Stop on (11/07)   -Continue Rytary 145mg 3 tabs at 8a, 12pm, 4pm and 8pm [During last visit with  , discussed about increase to 4 cap 4 times a day --> but wife has him on 3 cap 4 times a day] ]  -Continue Neupro patch 3mg /24hours daily [ Home Neupro patch 4mg /24, confirmed with wife]  -Movement disorders following - DBS programming on  Monday next week     Parkinson's related non-motor symptoms    #Mood/anxiety  -Continue Lexapro 20mg daily  -Continue Clonazepam 0.5mg BID PRN  Neuropsychology to follow    #Orthostatic hypotension  -Monitor OH vital signs    #Pain control  - Tylenol PRN  - Lidocaine patches to b/l knees  -Continue gabapentin 100mg in am and 200mg at bedtime     #GI/Bowel Management/Constipation  - Continue Senna at bedtime   - Miralax BID  - Bisacodyl PRN  -Pepcid 20mg daily    #Bladder management  - Continue to monitor PVR q 8 hours (SC if > 400)  -Monitor UO    Concurrent medical problems    #DVT Prophylaxis  - TEDs   - pharmacologic ppx contraindicated given recent surgery    #Skin:  -3 surgical sites on top of skull. Clean dry and intact. Staples present. No fluctuance, erythema, drainage, or oozing at time of exam.  -Do not remove dressings   -Can shower 2 day post-op but do not scrub incision    FEN   - Diet - Pureed Diet, Mildly Thick Liquids  [CCHO, DASH/TLC]    - Dysphagia  SLP - evaluation and treatment appreciated     Precautions / PROPHYLAXIS:   - Falls  - ortho: Weight bearing status: WBAT   - Lungs: Aspiration, Incentive Spirometer   - Pressure injury/Skin: Turn Q2hrs while in bed, OOB to Chair, PT/OT       Patient is a 62yo M with history of severe Parkinson's disease (dx in 2015), HTN, anxiety, who presented to [facility] 10/25 for DBS, now s/p Stage 1 and Stage 2 DBS. Hospital course complicated by brochospasm requiring overnight observation postoperatively, also now with dysphagia. Admitted to [facility] acute inpatient rehab on 10/31 for ADL, gait, and functional impairments.     #Parkinson's Disease now with gait Instability, ADL impairments and Functional impairments  - Start Comprehensive Rehab Program of PT/OT/SLP      Parkinson's related motor symptoms    #Rigidity/Bradykinesia/wearing off  -S/p Stage 1 Deep Brain Surgery Bilateral Implantation Into Globus Pallidus Internus With Leksell Frame on 10/25/23    -S/p Stage 2 on 10/30/23 - monitored overnight for hypoxia 2/2 bronchospasm  -Continue Keflex 500mg BID for post-op ppx x7 days. Stop on (11/07)   -Continue Rytary 145mg 3 tabs at 8a, 12pm, 4pm and 8pm [During last visit with  , discussed about increase to 4 cap 4 times a day --> but wife has him on 3 cap 4 times a day] ]  -Continue Neupro patch 3mg /24hours daily [ Home Neupro patch 4mg /24, confirmed with wife]  -Movement disorders following - DBS programming on  Monday next week     Parkinson's related non-motor symptoms    #Mood/anxiety  -Continue Lexapro 20mg daily  -Continue Clonazepam 0.5mg BID PRN  Neuropsychology to follow    #Orthostatic hypotension  -Monitor OH vital signs    #Pain control  - Tylenol PRN  - Lidocaine patches to b/l knees  -Continue gabapentin 100mg in am and 200mg at bedtime     #GI/Bowel Management/Constipation  - Continue Senna at bedtime   - Miralax BID, wife stated that he does not like it.  I discussed the importance of being compliant with taking meds.  If no BM till tomorrow will try Lactulose   - Bisacodyl PRN  - Pepcid 20mg daily    #Bladder management  - Continue to monitor PVR q 8 hours (SC if > 400)  - Monitor UO, voiding without issues     Concurrent medical problems    #DVT Prophylaxis  - TEDs   - pharmacologic ppx contraindicated given recent surgery    #Skin:  -3 surgical sites on top of skull. Clean dry and intact. Staples present. No fluctuance, erythema, drainage, or oozing at time of exam.  -Do not remove dressings   -Can shower 2 day post-op but do not scrub incision    FEN   - Diet - Pureed Diet, Mildly Thick Liquids  [CCHO, DASH/TLC]    - Dysphagia  SLP - evaluation and treatment appreciated     Precautions / PROPHYLAXIS:   - Falls  - ortho: Weight bearing status: WBAT   - Lungs: Aspiration, Incentive Spirometer   - Pressure injury/Skin: Turn Q2hrs while in bed, OOB to Chair, PT/OT

## 2023-11-03 NOTE — PROGRESS NOTE ADULT - SUBJECTIVE AND OBJECTIVE BOX
Movement Disorders Neurology  Consult Progress Note    Interval history:  The patient was seen and examined at bedside with his wife present. Per PT, the patient did very well during therapy today. The patient states that he slept well last night. Constipation is being addressed. Patient notes that he only had the L DBS placed and interested on when the R can be done. He denies dizziness/lightheadedness.     HPI:    Patient is a 62yo M with history of severe Parkinson's disease (dx in 2015), HTN, anxiety, who presented to [facility] 10/25 for DBS, now s/p Stage 1 and Stage 2 DBS. The patient follows with Dr. Gil as an outpatient.     He was diagnosed with Parkinson's disease in 2015 when he was having dragging of left leg, and difficulty moving. He was started on levodopa and it worked well for a few years. He now has B/L stiffness, and slowness, but no tremor. He has severe on/off motor fluctuations. He has sudden severe wearing offs of medications, and effects of medication lasts 1-2 hours then he has severe stiffness and slowness. He experiencing freezing of gait, and he can't speak or think at those times, but has not fallen.     His wife describes an episode where he suddenly went OFF and was gripping the walker tightly. She tries to tell him what to do to be safe, but states that he does his own thing. She denies him being impulsive.    He walks with walker most of the time, and he can usually do his own bathing/dressing/toileting/feeding most days, but sometimes needs help. The patient's wife is the primary caregiver, but also works.     After Stage II, post-operatively, he became hypoxic and required monitoring overnight due to bronchospasm. Patient was evaluated by PM&R and therapy for functional deficits and gait/ ADL impairments and recommended acute rehabilitation. Patient was medically optimized for discharge to [facility] on 10/31.    The patient's wife denies a history of constipation.   She states sometimes he gets confused  She denies a history of talking in sleep or acting out dreams.   No dizziness/lightheadedness when standing.   He has slight tremors in the right hand but tremors are not his predominant issue  They tried increasing his Rytary to 4 capsules 4 times a day but he did not do well with this and became more confused.   His wife notes that sometimes she gives him an extra capsule as needed    Current PD meds:  Lexapro 20mg daily  Gabapentin 100mg daily and at bedtime  Neupro 3mg daily  Rytary 145mg 3 capsules at 6e-45y-5p-8p  Clonazepam 0.5mg BID PRN  Dulxolax, Miralax, Senna    Physical examination:  Patient is awake and alert, participates in examination. Follows simple commands.  Face is moderately masked. Voice is moderate-severely hypophonic and intermittently dysarthric  No tremors  Mild rigidity bilaterally.  Mild-moderate bradykinesia, left more than right  Gait deferred

## 2023-11-04 PROCEDURE — 99232 SBSQ HOSP IP/OBS MODERATE 35: CPT

## 2023-11-04 RX ADMIN — Medication 1 APPLICATION(S): at 17:28

## 2023-11-04 RX ADMIN — ROTIGOTINE 1 PATCH: 8 PATCH, EXTENDED RELEASE TRANSDERMAL at 16:36

## 2023-11-04 RX ADMIN — FAMOTIDINE 20 MILLIGRAM(S): 10 INJECTION INTRAVENOUS at 12:27

## 2023-11-04 RX ADMIN — CARBIDOPA AND LEVODOPA 3 CAPSULE(S): 25; 100 TABLET ORAL at 08:16

## 2023-11-04 RX ADMIN — CARBIDOPA AND LEVODOPA 3 CAPSULE(S): 25; 100 TABLET ORAL at 16:37

## 2023-11-04 RX ADMIN — Medication 10 MILLIGRAM(S): at 16:46

## 2023-11-04 RX ADMIN — GABAPENTIN 100 MILLIGRAM(S): 400 CAPSULE ORAL at 12:27

## 2023-11-04 RX ADMIN — ROTIGOTINE 1 PATCH: 8 PATCH, EXTENDED RELEASE TRANSDERMAL at 16:37

## 2023-11-04 RX ADMIN — CARBIDOPA AND LEVODOPA 3 CAPSULE(S): 25; 100 TABLET ORAL at 12:27

## 2023-11-04 RX ADMIN — ESCITALOPRAM OXALATE 20 MILLIGRAM(S): 10 TABLET, FILM COATED ORAL at 12:27

## 2023-11-04 RX ADMIN — Medication 1 APPLICATION(S): at 05:38

## 2023-11-04 RX ADMIN — Medication 500 MILLIGRAM(S): at 17:28

## 2023-11-04 RX ADMIN — Medication 500 MILLIGRAM(S): at 05:38

## 2023-11-04 RX ADMIN — GABAPENTIN 100 MILLIGRAM(S): 400 CAPSULE ORAL at 19:59

## 2023-11-04 RX ADMIN — Medication 0.5 MILLIGRAM(S): at 21:13

## 2023-11-04 RX ADMIN — CARBIDOPA AND LEVODOPA 3 CAPSULE(S): 25; 100 TABLET ORAL at 19:59

## 2023-11-04 RX ADMIN — POLYETHYLENE GLYCOL 3350 17 GRAM(S): 17 POWDER, FOR SOLUTION ORAL at 17:28

## 2023-11-04 RX ADMIN — POLYETHYLENE GLYCOL 3350 17 GRAM(S): 17 POWDER, FOR SOLUTION ORAL at 05:39

## 2023-11-04 RX ADMIN — ROTIGOTINE 1 PATCH: 8 PATCH, EXTENDED RELEASE TRANSDERMAL at 19:19

## 2023-11-04 RX ADMIN — ROTIGOTINE 1 PATCH: 8 PATCH, EXTENDED RELEASE TRANSDERMAL at 06:45

## 2023-11-04 NOTE — PROGRESS NOTE ADULT - SUBJECTIVE AND OBJECTIVE BOX
Cc: Gait dysfunction    HPI: Patient with no new medical issues today.  Pain controlled, no chest pain, no N/V, no Fevers/Chills. No other new ROS  Has been tolerating rehabilitation program.    MEDICATIONS  (STANDING):  bisacodyl Suppository 10 milliGRAM(s) Rectal once  carbidopa 36.25 mG/levodopa 145 mG ER 3 Capsule(s) Oral <User Schedule>  cephalexin 500 milliGRAM(s) Oral every 12 hours  erythromycin   Ointment 1 Application(s) Both EYES four times a day  escitalopram 20 milliGRAM(s) Oral daily  famotidine    Tablet 20 milliGRAM(s) Oral daily  gabapentin 100 milliGRAM(s) Oral daily  gabapentin 100 milliGRAM(s) Oral at bedtime  lidocaine   4% Patch 2 Patch Transdermal <User Schedule>  polyethylene glycol 3350 17 Gram(s) Oral two times a day  rotigotine patch 3 mG/24 Hr(s) 1 Patch Transdermal <User Schedule>  senna 2 Tablet(s) Oral at bedtime    MEDICATIONS  (PRN):  acetaminophen     Tablet .. 650 milliGRAM(s) Oral every 6 hours PRN Mild Pain (1 - 3)  bisacodyl 5 milliGRAM(s) Oral at bedtime PRN Constipation  clonazePAM  Tablet 0.5 milliGRAM(s) Oral two times a day PRN for anxiety      Vital Signs Last 24 Hrs  T(C): 36.3 (04 Nov 2023 07:34), Max: 36.9 (03 Nov 2023 20:09)  T(F): 97.4 (04 Nov 2023 07:34), Max: 98.4 (03 Nov 2023 20:09)  HR: 64 (04 Nov 2023 07:34) (64 - 69)  BP: 143/81 (04 Nov 2023 07:34) (123/81 - 143/81)  BP(mean): --  RR: 16 (04 Nov 2023 07:34) (16 - 16)  SpO2: 95% (04 Nov 2023 07:34) (95% - 95%)    Parameters below as of 04 Nov 2023 07:34  Patient On (Oxygen Delivery Method): room air        In NAD  HEENT- EOMI  Heart- RRR, S1S2  Lungs- CTA bl.  Abd- + BS, NT  Ext- No calf pain  Neuro- Exam unchanged            CAPILLARY BLOOD GLUCOSE                    Imp: Patient with diagnosis of          admitted for comprehensive acute rehabilitation.    Plan:  - Continue therapies  - DVT prophylaxis  - Skin- Turn q2h, check skin daily  - Continue current medications; patient medically stable.   - Patient is stable to continue current rehabilitation program.    Cc: Gait dysfunction    HPI: Patient seen and examined, wife at bedside  Reports an uneventful night    ROS  Pain controlled, no chest pain, no N/V, no Fevers/Chills. LBM 11/2    Has been tolerating rehabilitation program.    MEDICATIONS  (STANDING):  bisacodyl Suppository 10 milliGRAM(s) Rectal once  carbidopa 36.25 mG/levodopa 145 mG ER 3 Capsule(s) Oral <User Schedule>  cephalexin 500 milliGRAM(s) Oral every 12 hours  erythromycin   Ointment 1 Application(s) Both EYES four times a day  escitalopram 20 milliGRAM(s) Oral daily  famotidine    Tablet 20 milliGRAM(s) Oral daily  gabapentin 100 milliGRAM(s) Oral daily  gabapentin 100 milliGRAM(s) Oral at bedtime  lidocaine   4% Patch 2 Patch Transdermal <User Schedule>  polyethylene glycol 3350 17 Gram(s) Oral two times a day  rotigotine patch 3 mG/24 Hr(s) 1 Patch Transdermal <User Schedule>  senna 2 Tablet(s) Oral at bedtime    MEDICATIONS  (PRN):  acetaminophen     Tablet .. 650 milliGRAM(s) Oral every 6 hours PRN Mild Pain (1 - 3)  bisacodyl 5 milliGRAM(s) Oral at bedtime PRN Constipation  clonazePAM  Tablet 0.5 milliGRAM(s) Oral two times a day PRN for anxiety      Vital Signs Last 24 Hrs  T(C): 36.3 (04 Nov 2023 07:34), Max: 36.9 (03 Nov 2023 20:09)  T(F): 97.4 (04 Nov 2023 07:34), Max: 98.4 (03 Nov 2023 20:09)  HR: 64 (04 Nov 2023 07:34) (64 - 69)  BP: 143/81 (04 Nov 2023 07:34) (123/81 - 143/81)  RR: 16 (04 Nov 2023 07:34) (16 - 16)  SpO2: 95% (04 Nov 2023 07:34) (95% - 95%)  Parameters below as of 04 Nov 2023 07:34  Patient On (Oxygen Delivery Method): room air      Exam  In NAD  HEENT- EOMI  Heart- RRR, S1S2  Lungs- Clear   Abd- + BS, non tender, slightly tympanic  Ext- No calf tenderness    Neuro- Exam Awake, alert, disoriented    Imp: Patient with diagnosis of Parkinson's disease s/p Deep Brain Stimulation admitted for comprehensive acute rehabilitation.    Plan:  - Continue therapies  - DVT prophylaxis--lovenox  - Skin- Turn q2h, check skin daily  --Constipation-- give rectal suppository, continue other bowel regimen  --Neuropathy--c/w gabapentin   --Parkinson's disease--c/w sienmet  - Continue current medications;    - Patient is stable to continue current rehabilitation program.

## 2023-11-05 PROCEDURE — 99232 SBSQ HOSP IP/OBS MODERATE 35: CPT

## 2023-11-05 RX ADMIN — CARBIDOPA AND LEVODOPA 3 CAPSULE(S): 25; 100 TABLET ORAL at 12:00

## 2023-11-05 RX ADMIN — Medication 1 APPLICATION(S): at 17:14

## 2023-11-05 RX ADMIN — Medication 500 MILLIGRAM(S): at 07:05

## 2023-11-05 RX ADMIN — CARBIDOPA AND LEVODOPA 3 CAPSULE(S): 25; 100 TABLET ORAL at 20:10

## 2023-11-05 RX ADMIN — ROTIGOTINE 1 PATCH: 8 PATCH, EXTENDED RELEASE TRANSDERMAL at 16:25

## 2023-11-05 RX ADMIN — POLYETHYLENE GLYCOL 3350 17 GRAM(S): 17 POWDER, FOR SOLUTION ORAL at 17:14

## 2023-11-05 RX ADMIN — GABAPENTIN 100 MILLIGRAM(S): 400 CAPSULE ORAL at 12:00

## 2023-11-05 RX ADMIN — ROTIGOTINE 1 PATCH: 8 PATCH, EXTENDED RELEASE TRANSDERMAL at 16:24

## 2023-11-05 RX ADMIN — Medication 500 MILLIGRAM(S): at 17:13

## 2023-11-05 RX ADMIN — CARBIDOPA AND LEVODOPA 3 CAPSULE(S): 25; 100 TABLET ORAL at 16:24

## 2023-11-05 RX ADMIN — ROTIGOTINE 1 PATCH: 8 PATCH, EXTENDED RELEASE TRANSDERMAL at 07:08

## 2023-11-05 RX ADMIN — FAMOTIDINE 20 MILLIGRAM(S): 10 INJECTION INTRAVENOUS at 12:00

## 2023-11-05 RX ADMIN — CARBIDOPA AND LEVODOPA 3 CAPSULE(S): 25; 100 TABLET ORAL at 08:25

## 2023-11-05 RX ADMIN — GABAPENTIN 100 MILLIGRAM(S): 400 CAPSULE ORAL at 20:30

## 2023-11-05 RX ADMIN — Medication 0.5 MILLIGRAM(S): at 22:07

## 2023-11-05 RX ADMIN — LIDOCAINE 2 PATCH: 4 CREAM TOPICAL at 17:18

## 2023-11-05 RX ADMIN — ESCITALOPRAM OXALATE 20 MILLIGRAM(S): 10 TABLET, FILM COATED ORAL at 12:00

## 2023-11-05 NOTE — PROGRESS NOTE ADULT - SUBJECTIVE AND OBJECTIVE BOX
Cc: Gait dysfunction    HPI: Patient with no new medical issues today.  Pain controlled, no chest pain, no N/V, no Fevers/Chills. No other new ROS  Has been tolerating rehabilitation program.    MEDICATIONS  (STANDING):  carbidopa 36.25 mG/levodopa 145 mG ER 3 Capsule(s) Oral <User Schedule>  cephalexin 500 milliGRAM(s) Oral every 12 hours  erythromycin   Ointment 1 Application(s) Both EYES four times a day  escitalopram 20 milliGRAM(s) Oral daily  famotidine    Tablet 20 milliGRAM(s) Oral daily  gabapentin 100 milliGRAM(s) Oral daily  gabapentin 100 milliGRAM(s) Oral at bedtime  lidocaine   4% Patch 2 Patch Transdermal <User Schedule>  polyethylene glycol 3350 17 Gram(s) Oral two times a day  rotigotine patch 3 mG/24 Hr(s) 1 Patch Transdermal <User Schedule>  senna 2 Tablet(s) Oral at bedtime    MEDICATIONS  (PRN):  acetaminophen     Tablet .. 650 milliGRAM(s) Oral every 6 hours PRN Mild Pain (1 - 3)  bisacodyl 5 milliGRAM(s) Oral at bedtime PRN Constipation  clonazePAM  Tablet 0.5 milliGRAM(s) Oral two times a day PRN for anxiety      Vital Signs Last 24 Hrs  T(C): 37.1 (05 Nov 2023 07:23), Max: 37.1 (05 Nov 2023 07:23)  T(F): 98.7 (05 Nov 2023 07:23), Max: 98.7 (05 Nov 2023 07:23)  HR: 67 (05 Nov 2023 07:23) (64 - 67)  BP: 142/87 (05 Nov 2023 07:23) (142/87 - 143/91)  BP(mean): --  RR: 16 (05 Nov 2023 07:23) (16 - 16)  SpO2: 95% (05 Nov 2023 07:23) (95% - 97%)    Parameters below as of 05 Nov 2023 07:23  Patient On (Oxygen Delivery Method): room air                 Cc: Gait dysfunction    HPI: Patient with no new medical issues today.  Report no acute med symptoms  Confirmed by staff to have had a BM yesterday with revised  bowel regimen  Although abd remains slightly tympanic      ROS  Pain controlled, no chest pain, no N/V, no Fevers/Chills. lbm 11/5  Has been tolerating rehabilitation program.    MEDICATIONS  (STANDING):  carbidopa 36.25 mG/levodopa 145 mG ER 3 Capsule(s) Oral <User Schedule>  cephalexin 500 milliGRAM(s) Oral every 12 hours  erythromycin   Ointment 1 Application(s) Both EYES four times a day  escitalopram 20 milliGRAM(s) Oral daily  famotidine    Tablet 20 milliGRAM(s) Oral daily  gabapentin 100 milliGRAM(s) Oral daily  gabapentin 100 milliGRAM(s) Oral at bedtime  lidocaine   4% Patch 2 Patch Transdermal <User Schedule>  polyethylene glycol 3350 17 Gram(s) Oral two times a day  rotigotine patch 3 mG/24 Hr(s) 1 Patch Transdermal <User Schedule>  senna 2 Tablet(s) Oral at bedtime    MEDICATIONS  (PRN):  acetaminophen     Tablet .. 650 milliGRAM(s) Oral every 6 hours PRN Mild Pain (1 - 3)  bisacodyl 5 milliGRAM(s) Oral at bedtime PRN Constipation  clonazePAM  Tablet 0.5 milliGRAM(s) Oral two times a day PRN for anxiety      Vital Signs Last 24 Hrs  T(C): 37.1 (05 Nov 2023 07:23), Max: 37.1 (05 Nov 2023 07:23)  T(F): 98.7 (05 Nov 2023 07:23), Max: 98.7 (05 Nov 2023 07:23)  HR: 67 (05 Nov 2023 07:23) (64 - 67)  BP: 142/87 (05 Nov 2023 07:23) (142/87 - 143/91)  BP(mean): --  RR: 16 (05 Nov 2023 07:23) (16 - 16)  SpO2: 95% (05 Nov 2023 07:23) (95% - 97%)    Parameters below as of 05 Nov 2023 07:23  Patient On (Oxygen Delivery Method): room air      Exam  In no acute distress  HEENT- EOMI  Heart- RRR, S1S2  Lungs- Clear   Abd- + BS, non tender, slightly tympanic  Ext- No calf tenderness    Neuro- Exam Awake, alert, disoriented,   but appropriately interactive during some parts of the review today     Imp: Patient with diagnosis of Parkinson's disease s/p Deep Brain Stimulation admitted for comprehensive acute rehabilitation.    Plan:  - Continue therapies  - DVT prophylaxis--lovenox  - Skin- Turn q2h, check skin daily  --Constipation--  continue bowel regimen  --Neuropathy--c/w gabapentin   --Parkinson's disease--c/w sienmet  - Continue current medications;    - Patient is stable to continue current rehabilitation program.

## 2023-11-06 LAB
ALBUMIN SERPL ELPH-MCNC: 3 G/DL — LOW (ref 3.3–5)
ALBUMIN SERPL ELPH-MCNC: 3 G/DL — LOW (ref 3.3–5)
ALP SERPL-CCNC: 74 U/L — SIGNIFICANT CHANGE UP (ref 40–120)
ALP SERPL-CCNC: 74 U/L — SIGNIFICANT CHANGE UP (ref 40–120)
ALT FLD-CCNC: 10 U/L — SIGNIFICANT CHANGE UP (ref 10–45)
ALT FLD-CCNC: 10 U/L — SIGNIFICANT CHANGE UP (ref 10–45)
ANION GAP SERPL CALC-SCNC: 5 MMOL/L — SIGNIFICANT CHANGE UP (ref 5–17)
ANION GAP SERPL CALC-SCNC: 5 MMOL/L — SIGNIFICANT CHANGE UP (ref 5–17)
AST SERPL-CCNC: 11 U/L — SIGNIFICANT CHANGE UP (ref 10–40)
AST SERPL-CCNC: 11 U/L — SIGNIFICANT CHANGE UP (ref 10–40)
BILIRUB SERPL-MCNC: 0.4 MG/DL — SIGNIFICANT CHANGE UP (ref 0.2–1.2)
BILIRUB SERPL-MCNC: 0.4 MG/DL — SIGNIFICANT CHANGE UP (ref 0.2–1.2)
BUN SERPL-MCNC: 17 MG/DL — SIGNIFICANT CHANGE UP (ref 7–23)
BUN SERPL-MCNC: 17 MG/DL — SIGNIFICANT CHANGE UP (ref 7–23)
CALCIUM SERPL-MCNC: 8.5 MG/DL — SIGNIFICANT CHANGE UP (ref 8.4–10.5)
CALCIUM SERPL-MCNC: 8.5 MG/DL — SIGNIFICANT CHANGE UP (ref 8.4–10.5)
CHLORIDE SERPL-SCNC: 103 MMOL/L — SIGNIFICANT CHANGE UP (ref 96–108)
CHLORIDE SERPL-SCNC: 103 MMOL/L — SIGNIFICANT CHANGE UP (ref 96–108)
CO2 SERPL-SCNC: 29 MMOL/L — SIGNIFICANT CHANGE UP (ref 22–31)
CO2 SERPL-SCNC: 29 MMOL/L — SIGNIFICANT CHANGE UP (ref 22–31)
CREAT SERPL-MCNC: 0.66 MG/DL — SIGNIFICANT CHANGE UP (ref 0.5–1.3)
CREAT SERPL-MCNC: 0.66 MG/DL — SIGNIFICANT CHANGE UP (ref 0.5–1.3)
EGFR: 106 ML/MIN/1.73M2 — SIGNIFICANT CHANGE UP
EGFR: 106 ML/MIN/1.73M2 — SIGNIFICANT CHANGE UP
GLUCOSE SERPL-MCNC: 115 MG/DL — HIGH (ref 70–99)
GLUCOSE SERPL-MCNC: 115 MG/DL — HIGH (ref 70–99)
HCT VFR BLD CALC: 38.8 % — LOW (ref 39–50)
HCT VFR BLD CALC: 38.8 % — LOW (ref 39–50)
HGB BLD-MCNC: 12.4 G/DL — LOW (ref 13–17)
HGB BLD-MCNC: 12.4 G/DL — LOW (ref 13–17)
MCHC RBC-ENTMCNC: 29.6 PG — SIGNIFICANT CHANGE UP (ref 27–34)
MCHC RBC-ENTMCNC: 29.6 PG — SIGNIFICANT CHANGE UP (ref 27–34)
MCHC RBC-ENTMCNC: 32 GM/DL — SIGNIFICANT CHANGE UP (ref 32–36)
MCHC RBC-ENTMCNC: 32 GM/DL — SIGNIFICANT CHANGE UP (ref 32–36)
MCV RBC AUTO: 92.6 FL — SIGNIFICANT CHANGE UP (ref 80–100)
MCV RBC AUTO: 92.6 FL — SIGNIFICANT CHANGE UP (ref 80–100)
NRBC # BLD: 0 /100 WBCS — SIGNIFICANT CHANGE UP (ref 0–0)
NRBC # BLD: 0 /100 WBCS — SIGNIFICANT CHANGE UP (ref 0–0)
PLATELET # BLD AUTO: 375 K/UL — SIGNIFICANT CHANGE UP (ref 150–400)
PLATELET # BLD AUTO: 375 K/UL — SIGNIFICANT CHANGE UP (ref 150–400)
POTASSIUM SERPL-MCNC: 3.6 MMOL/L — SIGNIFICANT CHANGE UP (ref 3.5–5.3)
POTASSIUM SERPL-MCNC: 3.6 MMOL/L — SIGNIFICANT CHANGE UP (ref 3.5–5.3)
POTASSIUM SERPL-SCNC: 3.6 MMOL/L — SIGNIFICANT CHANGE UP (ref 3.5–5.3)
POTASSIUM SERPL-SCNC: 3.6 MMOL/L — SIGNIFICANT CHANGE UP (ref 3.5–5.3)
PROT SERPL-MCNC: 6.3 G/DL — SIGNIFICANT CHANGE UP (ref 6–8.3)
PROT SERPL-MCNC: 6.3 G/DL — SIGNIFICANT CHANGE UP (ref 6–8.3)
RBC # BLD: 4.19 M/UL — LOW (ref 4.2–5.8)
RBC # BLD: 4.19 M/UL — LOW (ref 4.2–5.8)
RBC # FLD: 13.2 % — SIGNIFICANT CHANGE UP (ref 10.3–14.5)
RBC # FLD: 13.2 % — SIGNIFICANT CHANGE UP (ref 10.3–14.5)
SODIUM SERPL-SCNC: 137 MMOL/L — SIGNIFICANT CHANGE UP (ref 135–145)
SODIUM SERPL-SCNC: 137 MMOL/L — SIGNIFICANT CHANGE UP (ref 135–145)
WBC # BLD: 8.74 K/UL — SIGNIFICANT CHANGE UP (ref 3.8–10.5)
WBC # BLD: 8.74 K/UL — SIGNIFICANT CHANGE UP (ref 3.8–10.5)
WBC # FLD AUTO: 8.74 K/UL — SIGNIFICANT CHANGE UP (ref 3.8–10.5)
WBC # FLD AUTO: 8.74 K/UL — SIGNIFICANT CHANGE UP (ref 3.8–10.5)

## 2023-11-06 PROCEDURE — 95961 ELECTRODE STIMULATION BRAIN: CPT | Mod: 26

## 2023-11-06 PROCEDURE — 99233 SBSQ HOSP IP/OBS HIGH 50: CPT

## 2023-11-06 PROCEDURE — 99233 SBSQ HOSP IP/OBS HIGH 50: CPT | Mod: 25

## 2023-11-06 RX ORDER — RIVASTIGMINE 4.6 MG/24H
1.5 PATCH, EXTENDED RELEASE TRANSDERMAL
Refills: 0 | Status: DISCONTINUED | OUTPATIENT
Start: 2023-11-06 | End: 2023-11-14

## 2023-11-06 RX ORDER — ROTIGOTINE 8 MG/24H
1 PATCH, EXTENDED RELEASE TRANSDERMAL
Refills: 0 | Status: DISCONTINUED | OUTPATIENT
Start: 2023-11-07 | End: 2023-11-14

## 2023-11-06 RX ORDER — MIRTAZAPINE 45 MG/1
7.5 TABLET, ORALLY DISINTEGRATING ORAL AT BEDTIME
Refills: 0 | Status: DISCONTINUED | OUTPATIENT
Start: 2023-11-06 | End: 2023-11-14

## 2023-11-06 RX ADMIN — Medication 650 MILLIGRAM(S): at 06:43

## 2023-11-06 RX ADMIN — Medication 650 MILLIGRAM(S): at 05:43

## 2023-11-06 RX ADMIN — MIRTAZAPINE 7.5 MILLIGRAM(S): 45 TABLET, ORALLY DISINTEGRATING ORAL at 20:24

## 2023-11-06 RX ADMIN — ROTIGOTINE 1 PATCH: 8 PATCH, EXTENDED RELEASE TRANSDERMAL at 07:27

## 2023-11-06 RX ADMIN — FAMOTIDINE 20 MILLIGRAM(S): 10 INJECTION INTRAVENOUS at 12:59

## 2023-11-06 RX ADMIN — CARBIDOPA AND LEVODOPA 3 CAPSULE(S): 25; 100 TABLET ORAL at 15:21

## 2023-11-06 RX ADMIN — GABAPENTIN 100 MILLIGRAM(S): 400 CAPSULE ORAL at 20:25

## 2023-11-06 RX ADMIN — CARBIDOPA AND LEVODOPA 3 CAPSULE(S): 25; 100 TABLET ORAL at 08:27

## 2023-11-06 RX ADMIN — Medication 1 APPLICATION(S): at 12:59

## 2023-11-06 RX ADMIN — SENNA PLUS 2 TABLET(S): 8.6 TABLET ORAL at 20:25

## 2023-11-06 RX ADMIN — LIDOCAINE 2 PATCH: 4 CREAM TOPICAL at 05:34

## 2023-11-06 RX ADMIN — RIVASTIGMINE 1.5 MILLIGRAM(S): 4.6 PATCH, EXTENDED RELEASE TRANSDERMAL at 17:33

## 2023-11-06 RX ADMIN — ESCITALOPRAM OXALATE 20 MILLIGRAM(S): 10 TABLET, FILM COATED ORAL at 13:00

## 2023-11-06 RX ADMIN — Medication 500 MILLIGRAM(S): at 05:42

## 2023-11-06 RX ADMIN — Medication 1 APPLICATION(S): at 05:43

## 2023-11-06 RX ADMIN — ROTIGOTINE 1 PATCH: 8 PATCH, EXTENDED RELEASE TRANSDERMAL at 17:28

## 2023-11-06 RX ADMIN — GABAPENTIN 100 MILLIGRAM(S): 400 CAPSULE ORAL at 13:00

## 2023-11-06 RX ADMIN — CARBIDOPA AND LEVODOPA 3 CAPSULE(S): 25; 100 TABLET ORAL at 20:09

## 2023-11-06 RX ADMIN — Medication 500 MILLIGRAM(S): at 17:35

## 2023-11-06 NOTE — PROGRESS NOTE ADULT - SUBJECTIVE AND OBJECTIVE BOX
CHIEF COMPLAINT: no new complaints, no acute events overnight       HISTORY OF PRESENT ILLNESS    Patient is a 62yo M with history of severe Parkinson's disease (dx in 2015), HTN, anxiety, who presented to [facility] 10/25 for DBS, now s/p Stage 1 and Stage 2 DBS.He was diagnosed with Parkinson's disease in 2015 when he was having dragging of left leg, and difficulty moving. He was started on levodopa and it worked well for a few years. He now has B/L stiffness, and slowness, but no tremor. He is also experiencing neck discomfort and tilting of his neck forward. He has severe on/off motor fluctuations. He has sudden severe wearing offs of medications, and effects of medication lasts 1-2 hours then he has severe stiffness and slowness. He moves his neck better when he takes the medication. He experiencing freezing of gait, and he can't speak or think at those times. He walks with walker most of the time, and he can usually do his own bathing/dressing/toileting/feeding most days, but sometimes needs help. He is s/p Stage 1 Deep Brain Surgery Bilateral Implantation Into Globus Pallidus Internus With Leksell Frame on 10/25/23 and plan for Stage 2 on 10/30/23. He tolerated procedure well. He was evaluated for admission to acute inpatient rehab and admitted to [facility] on 10/27/23. Subsequently transferred to [facility] for Stage 2 DBS 10/30. Post-operatively, he became hypoxic and required monitoring overnight due to bronchospasm. Patient was evaluated by PM&R and therapy for functional deficits and gait/ ADL impairments and recommended acute rehabilitation. Patient was medically optimized for discharge to [facility] on 10/31.  (31 Oct 2023 15:41)        PAST MEDICAL & SURGICAL HISTORY:  Parkinson disease      Anxiety and depression      Status post Mohs surgery    Vital Signs Last 24 Hrs  T(C): 36.8 (06 Nov 2023 08:42), Max: 37.1 (05 Nov 2023 20:00)  T(F): 98.2 (06 Nov 2023 08:42), Max: 98.8 (05 Nov 2023 20:00)  HR: 74 (06 Nov 2023 08:42) (68 - 74)  BP: 739/83 (06 Nov 2023 08:42) (132/86 - 739/83)  BP(mean): --  RR: 16 (05 Nov 2023 20:00) (16 - 16)  SpO2: 95% (05 Nov 2023 20:00) (95% - 95%)    Parameters below as of 05 Nov 2023 20:00  Patient On (Oxygen Delivery Method): room air          PHYSICAL EXAM  Constitutional - NAD, Comfortable  HEENT - NCAT, EOMI  Neck - Supple, No limited ROM  Chest - CTA bilaterally  Cardiovascular - RRR, S1S2  Abdomen - Soft, NTND  Extremities - No calf tenderness   Neurologic Exam - no new focal deficits                     RECENT LABS                        12.4   8.74  )-----------( 375      ( 06 Nov 2023 06:46 )             38.8     11-06    137  |  103  |  17  ----------------------------<  115<H>  3.6   |  29  |  0.66    Ca    8.5      06 Nov 2023 06:46    TPro  6.3  /  Alb  3.0<L>  /  TBili  0.4  /  DBili  x   /  AST  11  /  ALT  10  /  AlkPhos  74  11-06      LIVER FUNCTIONS - ( 06 Nov 2023 06:46 )  Alb: 3.0 g/dL / Pro: 6.3 g/dL / ALK PHOS: 74 U/L / ALT: 10 U/L / AST: 11 U/L / GGT: x           Urinalysis Basic - ( 06 Nov 2023 06:46 )    Color: x / Appearance: x / SG: x / pH: x  Gluc: 115 mg/dL / Ketone: x  / Bili: x / Urobili: x   Blood: x / Protein: x / Nitrite: x   Leuk Esterase: x / RBC: x / WBC x   Sq Epi: x / Non Sq Epi: x / Bacteria: x                  Urinalysis Basic - ( 06 Nov 2023 06:46 )    Color: x / Appearance: x / SG: x / pH: x  Gluc: 115 mg/dL / Ketone: x  / Bili: x / Urobili: x   Blood: x / Protein: x / Nitrite: x   Leuk Esterase: x / RBC: x / WBC x   Sq Epi: x / Non Sq Epi: x / Bacteria: x          RADIOLOGY/OTHER RESULTS      CURRENT MEDICATIONS  MEDICATIONS  (STANDING):  carbidopa 36.25 mG/levodopa 145 mG ER 3 Capsule(s) Oral <User Schedule>  cephalexin 500 milliGRAM(s) Oral every 12 hours  erythromycin   Ointment 1 Application(s) Both EYES four times a day  escitalopram 20 milliGRAM(s) Oral daily  famotidine    Tablet 20 milliGRAM(s) Oral daily  gabapentin 100 milliGRAM(s) Oral daily  gabapentin 100 milliGRAM(s) Oral at bedtime  lidocaine   4% Patch 2 Patch Transdermal <User Schedule>  mirtazapine 7.5 milliGRAM(s) Oral at bedtime  polyethylene glycol 3350 17 Gram(s) Oral two times a day  rivastigmine 1.5 milliGRAM(s) Oral two times a day  senna 2 Tablet(s) Oral at bedtime    MEDICATIONS  (PRN):  acetaminophen     Tablet .. 650 milliGRAM(s) Oral every 6 hours PRN Mild Pain (1 - 3)  bisacodyl 5 milliGRAM(s) Oral at bedtime PRN Constipation  clonazePAM  Tablet 0.5 milliGRAM(s) Oral two times a day PRN for anxiety

## 2023-11-06 NOTE — CHART NOTE - NSCHARTNOTEFT_GEN_A_CORE
Nutrition Follow Up Note  Source: Medical Record [X] Patient [X] Family [ ]         Diet: Regular  Pt tolerating diet with variable PO intake, pt was not eating well on pureed consistency but upgraded to regular. Encouraged PO intake during meals. Pt is receiving magic cup but will d/c; trial acceptance of regular ice cream instead. Pt declined oral nutrition supplement.    Enteral/Parenteral Nutrition: N/A    Current Weight: 217.5 lbs (10-31)    Pertinent Medications: MEDICATIONS  (STANDING):  carbidopa 36.25 mG/levodopa 145 mG ER 3 Capsule(s) Oral <User Schedule>  cephalexin 500 milliGRAM(s) Oral every 12 hours  erythromycin   Ointment 1 Application(s) Both EYES four times a day  escitalopram 20 milliGRAM(s) Oral daily  famotidine    Tablet 20 milliGRAM(s) Oral daily  gabapentin 100 milliGRAM(s) Oral daily  gabapentin 100 milliGRAM(s) Oral at bedtime  lidocaine   4% Patch 2 Patch Transdermal <User Schedule>  mirtazapine 7.5 milliGRAM(s) Oral at bedtime  polyethylene glycol 3350 17 Gram(s) Oral two times a day  rivastigmine 1.5 milliGRAM(s) Oral two times a day  senna 2 Tablet(s) Oral at bedtime    MEDICATIONS  (PRN):  acetaminophen     Tablet .. 650 milliGRAM(s) Oral every 6 hours PRN Mild Pain (1 - 3)  bisacodyl 5 milliGRAM(s) Oral at bedtime PRN Constipation  clonazePAM  Tablet 0.5 milliGRAM(s) Oral two times a day PRN for anxiety      Pertinent Labs:  11-06 Na137 mmol/L Glu 115 mg/dL<H> K+ 3.6 mmol/L Cr  0.66 mg/dL BUN 17 mg/dL 11-06 Alb 3.0 g/dL<L>        Skin: surgical incision per nursing flow sheets     Edema: No edema per nursing flow sheets     Last BM: on 11-4 Per nursing flowsheets     Estimated Needs:   [X] No Change since Previous Assessment  [ ] Recalculated:     Previous Nutrition Diagnosis:   1. Inadequate oral intake  2. Swallowing difficulty - resolved    Nutrition Diagnosis is [X] Ongoing      New Nutrition Diagnosis: [X] Not Applicable  [ ] Inadequate Protein Energy Intake   [ ] Inadequate Oral Intake   [ ] Excessive Energy Intake   [ ] Increased Nutrient Needs   [ ] Obesity   [ ] Altered GI Function   [ ] Unintended Weight Loss   [ ] Food & Nutrition Related Knowledge Deficit  [ ] Limited Adherence to nutrition related recommendations   [ ] Malnutrition      Interventions:   1. Recommend continuing with current plan of care  2. Encourage PO intake  3. Obtain and honor food preferences as able    Monitoring & Evaluation:   [X] Weights   [X] PO Intake   [X] Follow Up (Per Protocol)  [X] Tolerance to Diet Prescription   [X] Other: Labs     RD Remains Available.  Hayley Street RD

## 2023-11-06 NOTE — PROGRESS NOTE ADULT - ASSESSMENT
Patient is a 62yo M with history of severe Parkinson's disease (dx in 2015), HTN, anxiety, who presented to [facility] 10/25 for DBS, now s/p Stage 1 and Stage 2 DBS. The patient follows with Dr. Gil as an outpatient.     The patient's history is consistent with parkinsonism. He has been participating well in therapy.   L DBS placed with IPG.      Recommendations:  - DBS programming done today  - Add Rivastigmine 1.5mg BID  - Add Mirtazepine 7.5mg at bedtime  - Continue Rytary 145mg 3 capsules QID at 9a-46z-0v-8p.  - reduce the Neupro patch 3mg to every alternate day [ did not get a dose today, will get it tomorrow]  - Continue Lexapro 20mg daily  - Continue Gabapentin 100mg BID  - Continue Clonazepam 0.5mg BID PRN anxiety  Bowel movements titrated to 1 bowel movement daily. Continue Miralax and Senna.   Monitor for OH  Monitor for hallucinations  Will consider Rivastigmine for cognition  Continue PT/OT/SLP as part of inpatient PD program    Case was discussed in detail with the patient's wife, patient and primary team.  Movement Disorders Neurology will continue to follow this patient.       Patient staffed with attending Dr.Persaud Angelia Cook MD  Movement Disorder fellow

## 2023-11-06 NOTE — PROGRESS NOTE ADULT - ASSESSMENT
Patient is a 62yo M with history of severe Parkinson's disease (dx in 2015), HTN, anxiety, who presented to [facility] 10/25 for DBS, now s/p Stage 1 and Stage 2 DBS. Hospital course complicated by brochospasm requiring overnight observation postoperatively, also now with dysphagia. Admitted to [place] acute inpatient rehab on 10/31 for ADL, gait, and functional impairments.     #Parkinson's Disease now with gait Instability, ADL impairments and Functional impairments  - Start Comprehensive Rehab Program of PT/OT/SLP      Parkinson's related motor symptoms    #Rigidity/Bradykinesia/wearing off  -S/p Stage 1 Deep Brain Surgery Bilateral Implantation Into Globus Pallidus Internus With Leksell Frame on 10/25/23    -S/p Stage 2 on 10/30/23 - monitored overnight for hypoxia 2/2 bronchospasm  -Continue Keflex 500mg BID for post-op ppx x7 days. Stop on (11/07)   -Continue Rytary 145mg 3 tabs at 8a, 12pm, 4pm and 8pm [During last visit with  , discussed about increase to 4 cap 4 times a day --> but wife has him on 3 cap 4 times a day] ]  -Continue Neupro patch 3mg /24hours daily [ Home Neupro patch 4mg /24, confirmed with wife]  -Movement disorders following - DBS programming today     Parkinson's related non-motor symptoms    #Mood/anxiety  -Continue Lexapro 20mg daily  -Continue Clonazepam 0.5mg BID PRN  Neuropsychology to follow    #Orthostatic hypotension  -Monitor OH vital signs    #Pain control  - Tylenol PRN  - Lidocaine patches to b/l knees  -Continue gabapentin 100mg in am and 200mg at bedtime     #GI/Bowel Management/Constipation  - Continue Senna at bedtime   - Miralax BID, wife stated that he does not like it.  I discussed the importance of being compliant with taking meds.  If no BM till tomorrow will try Lactulose   - Bisacodyl PRN  - Pepcid 20mg daily    #Bladder management  - Continue to monitor PVR q 8 hours (SC if > 400)  - Monitor UO, voiding without issues     Concurrent medical problems    #DVT Prophylaxis  - TEDs   - pharmacologic ppx contraindicated given recent surgery    #Skin:  -3 surgical sites on top of skull. Clean dry and intact. Staples present. No fluctuance, erythema, drainage, or oozing at time of exam.  -Do not remove dressings   -Can shower 2 day post-op but do not scrub incision    FEN   - Diet - Pureed Diet, Mildly Thick Liquids  [CCHO, DASH/TLC]    - Dysphagia  SLP - evaluation and treatment appreciated     Precautions / PROPHYLAXIS:   - Falls  - ortho: Weight bearing status: WBAT   - Lungs: Aspiration, Incentive Spirometer   - Pressure injury/Skin: Turn Q2hrs while in bed, OOB to Chair, PT/OT        Multidisciplinary team meeting today:  patient's functional goals and needs, functional and clinical  progress were discussed, barriers to discharge were identified. Anticipate discharge home with home care, 24/7 supervision for safety.  EDOD  11/14/23    57 min spent

## 2023-11-06 NOTE — PROGRESS NOTE ADULT - SUBJECTIVE AND OBJECTIVE BOX
Movement Disorders Neurology  Consult Progress Note    Interval history:  Patient seen and examined at bedside. noon dose of Rytary for DBS programming today     HPI:    Patient is a 64yo M with history of severe Parkinson's disease (dx in 2015), HTN, anxiety, who presented to [facility] 10/25 for DBS, now s/p Stage 1 and Stage 2 DBS. The patient follows with Dr. Gil as an outpatient.     He was diagnosed with Parkinson's disease in 2015 when he was having dragging of left leg, and difficulty moving. He was started on levodopa and it worked well for a few years. He now has B/L stiffness, and slowness, but no tremor. He has severe on/off motor fluctuations. He has sudden severe wearing offs of medications, and effects of medication lasts 1-2 hours then he has severe stiffness and slowness. He experiencing freezing of gait, and he can't speak or think at those times, but has not fallen.     His wife describes an episode where he suddenly went OFF and was gripping the walker tightly. She tries to tell him what to do to be safe, but states that he does his own thing. She denies him being impulsive.    He walks with walker most of the time, and he can usually do his own bathing/dressing/toileting/feeding most days, but sometimes needs help. The patient's wife is the primary caregiver, but also works.     After Stage II, post-operatively, he became hypoxic and required monitoring overnight due to bronchospasm. Patient was evaluated by PM&R and therapy for functional deficits and gait/ ADL impairments and recommended acute rehabilitation. Patient was medically optimized for discharge to [facility] on 10/31.    The patient's wife denies a history of constipation.   She states sometimes he gets confused  She denies a history of talking in sleep or acting out dreams.   No dizziness/lightheadedness when standing.   He has slight tremors in the right hand but tremors are not his predominant issue  They tried increasing his Rytary to 4 capsules 4 times a day but he did not do well with this and became more confused.   His wife notes that sometimes she gives him an extra capsule as needed    Current PD meds:  Lexapro 20mg daily  Gabapentin 100mg daily and at bedtime  Neupro 3mg daily  Rytary 145mg 3 capsules at 8a-66y-7y-8p  Clonazepam 0.5mg BID PRN  Dulxolax, Miralax, Senna    Physical examination:  Patient is awake and alert, participates in examination. Follows simple commands.  able to answer the name, month, and president correctly  Face is moderately masked. Voice is moderate-severely hypophonic and intermittently dysarthric  No tremors  Mild rigidity bilaterally  Mild-moderate bradykinesia, left more than right      DBS programming  First programming   Left GPi target    Current setting  Contact 2  Setting : 1.5/60/125    Based  on brain sense Contact 1 and contact 2 were choosen    Contact 1: tried as high of stimulation of 4 at which he was able to intiate movement better, one person assist to get up from sitting position, good stride lenght, speech was better  Contact 2, stimulation of 1.5 , able to get from sitting position by himself, initiation of movement better, speech better, bradykinesia improved , walked the entire hallway  higher stim of > 1.5 on contact 2, did not show any improvement in bradykinesia, speech and gait     Movement Disorders Neurology  Consult Progress Note    Interval history:  Patient seen and examined at bedside. noon dose of Rytary held for DBS programming today, resumed for 4PM dose. Patient tolerated programming well with good response with respect to gait and balance, as well as cognition.    HPI:    Patient is a 62yo M with history of severe Parkinson's disease (dx in 2015), HTN, anxiety, who presented to [facility] 10/25 for DBS, now s/p Stage 1 and Stage 2 DBS. The patient follows with Dr. Gil as an outpatient.     He was diagnosed with Parkinson's disease in 2015 when he was having dragging of left leg, and difficulty moving. He was started on levodopa and it worked well for a few years. He now has B/L stiffness, and slowness, but no tremor. He has severe on/off motor fluctuations. He has sudden severe wearing offs of medications, and effects of medication lasts 1-2 hours then he has severe stiffness and slowness. He experiencing freezing of gait, and he can't speak or think at those times, but has not fallen.     His wife describes an episode where he suddenly went OFF and was gripping the walker tightly. She tries to tell him what to do to be safe, but states that he does his own thing. She denies him being impulsive.    He walks with walker most of the time, and he can usually do his own bathing/dressing/toileting/feeding most days, but sometimes needs help. The patient's wife is the primary caregiver, but also works.     After Stage II, post-operatively, he became hypoxic and required monitoring overnight due to bronchospasm. Patient was evaluated by PM&R and therapy for functional deficits and gait/ ADL impairments and recommended acute rehabilitation. Patient was medically optimized for discharge to [facility] on 10/31.    The patient's wife denies a history of constipation.   She states sometimes he gets confused  She denies a history of talking in sleep or acting out dreams.   No dizziness/lightheadedness when standing.   He has slight tremors in the right hand but tremors are not his predominant issue  They tried increasing his Rytary to 4 capsules 4 times a day but he did not do well with this and became more confused.   His wife notes that sometimes she gives him an extra capsule as needed    Current PD meds:  Lexapro 20mg daily  Gabapentin 100mg daily and at bedtime  Neupro 3mg daily  Rytary 145mg 3 capsules at 5g-83o-6z-8p  Clonazepam 0.5mg BID PRN  Dulxolax, Miralax, Senna    Physical examination:  Patient is awake and alert, participates in examination. Follows simple commands.  able to answer the name, month, and president correctly  Face is moderately masked. Voice is moderate-severely hypophonic and intermittently dysarthric  No tremors  Mild rigidity bilaterally  Mild-moderate bradykinesia, left more than right      DBS programming  First programming   Left GPi target    Current setting  Contact 2  Setting : 1.5/60/125    Based  on brain sense Contact 1 and contact 2 were choosen    Contact 1: tried as high of stimulation of 4 at which he was able to intiate movement better, one person assist to get up from sitting position, good stride lenght, speech was better  Contact 2, stimulation of 1.5 , able to get from sitting position by himself, initiation of movement better, speech better, bradykinesia improved , walked the entire hallway  higher stim of > 1.5 on contact 2, did not show any improvement in bradykinesia, speech and gait

## 2023-11-07 PROCEDURE — 99233 SBSQ HOSP IP/OBS HIGH 50: CPT

## 2023-11-07 RX ORDER — HYDROCORTISONE 1 %
1 OINTMENT (GRAM) TOPICAL
Refills: 0 | Status: COMPLETED | OUTPATIENT
Start: 2023-11-07 | End: 2023-11-14

## 2023-11-07 RX ADMIN — CARBIDOPA AND LEVODOPA 3 CAPSULE(S): 25; 100 TABLET ORAL at 12:29

## 2023-11-07 RX ADMIN — POLYETHYLENE GLYCOL 3350 17 GRAM(S): 17 POWDER, FOR SOLUTION ORAL at 05:33

## 2023-11-07 RX ADMIN — ESCITALOPRAM OXALATE 20 MILLIGRAM(S): 10 TABLET, FILM COATED ORAL at 12:30

## 2023-11-07 RX ADMIN — CARBIDOPA AND LEVODOPA 3 CAPSULE(S): 25; 100 TABLET ORAL at 16:06

## 2023-11-07 RX ADMIN — Medication 1 APPLICATION(S): at 05:33

## 2023-11-07 RX ADMIN — CARBIDOPA AND LEVODOPA 3 CAPSULE(S): 25; 100 TABLET ORAL at 20:03

## 2023-11-07 RX ADMIN — Medication 1 APPLICATION(S): at 12:29

## 2023-11-07 RX ADMIN — FAMOTIDINE 20 MILLIGRAM(S): 10 INJECTION INTRAVENOUS at 12:30

## 2023-11-07 RX ADMIN — Medication 500 MILLIGRAM(S): at 05:33

## 2023-11-07 RX ADMIN — SENNA PLUS 2 TABLET(S): 8.6 TABLET ORAL at 20:10

## 2023-11-07 RX ADMIN — MIRTAZAPINE 7.5 MILLIGRAM(S): 45 TABLET, ORALLY DISINTEGRATING ORAL at 20:10

## 2023-11-07 RX ADMIN — Medication 1 APPLICATION(S): at 17:44

## 2023-11-07 RX ADMIN — Medication 1 APPLICATION(S): at 17:40

## 2023-11-07 RX ADMIN — GABAPENTIN 100 MILLIGRAM(S): 400 CAPSULE ORAL at 20:09

## 2023-11-07 RX ADMIN — CARBIDOPA AND LEVODOPA 3 CAPSULE(S): 25; 100 TABLET ORAL at 08:14

## 2023-11-07 RX ADMIN — RIVASTIGMINE 1.5 MILLIGRAM(S): 4.6 PATCH, EXTENDED RELEASE TRANSDERMAL at 05:34

## 2023-11-07 RX ADMIN — POLYETHYLENE GLYCOL 3350 17 GRAM(S): 17 POWDER, FOR SOLUTION ORAL at 17:40

## 2023-11-07 RX ADMIN — GABAPENTIN 100 MILLIGRAM(S): 400 CAPSULE ORAL at 12:29

## 2023-11-07 RX ADMIN — ROTIGOTINE 1 PATCH: 8 PATCH, EXTENDED RELEASE TRANSDERMAL at 16:08

## 2023-11-07 RX ADMIN — RIVASTIGMINE 1.5 MILLIGRAM(S): 4.6 PATCH, EXTENDED RELEASE TRANSDERMAL at 17:40

## 2023-11-07 NOTE — PROGRESS NOTE ADULT - ASSESSMENT
Patient is a 62yo M with history of severe Parkinson's disease (dx in 2015), HTN, anxiety, who presented to [facility] 10/25 for DBS, now s/p Stage 1 and Stage 2 DBS. The patient follows with Dr. Gil as an outpatient.     The patient's history is consistent with parkinsonism. He has been participating well in therapy.   Programming of L GPi DBS lead went well with no complications. The patient has shown improvement in overall motor symptoms and cognition appears improved as well.       Recommendations:  - Continue Rivastigmine 1.5mg BID  - Cotninue Mirtazepine 7.5mg at bedtime  - Continue Rytary 145mg 3 capsules QID at 3q-55r-1b-8p.  - reduce the Neupro patch 3mg to every alternate day x 8 days, then stop  - Continue Lexapro 20mg daily  - Continue Gabapentin 100mg BID  - Continue Clonazepam 0.5mg BID PRN anxiety  Bowel movements titrated to 1 bowel movement daily. Continue Miralax and Senna.   Monitor for OH  Monitor for hallucinations  Continue PT/OT/SLP as part of inpatient PD program    Case was discussed in detail with the patient's wife, patient and primary team.  Movement Disorders Neurology will continue to follow this patient.

## 2023-11-07 NOTE — PROGRESS NOTE ADULT - NS ATTEND AMEND GEN_ALL_CORE FT
I have personally seen and examined the patient with the NP. Medical records reviewed. I have made amendments to the documentation where necessary and adjusted the history, physical examination, and plan as documented by the NP.    Wife at bedside - detailed update given, all questions answered, discharge planning is in progress.    57 min spent

## 2023-11-07 NOTE — PROGRESS NOTE ADULT - ASSESSMENT
Patient is a 62yo M with history of severe Parkinson's disease (dx in 2015), HTN, anxiety, who presented to [facility] 10/25 for DBS, now s/p Stage 1 and Stage 2 DBS. Hospital course complicated by brochospasm requiring overnight observation postoperatively, also now with dysphagia. Admitted to [place] acute inpatient rehab on 10/31 for ADL, gait, and functional impairments.     #Parkinson's Disease now with gait Instability, ADL impairments and Functional impairments  - Continue Comprehensive Rehab Program of PT/OT/SLP      Parkinson's related motor symptoms    #Rigidity/Bradykinesia/wearing off  -S/p Stage 1 Deep Brain Surgery Bilateral Implantation Into Globus Pallidus Internus With Leksell Frame on 10/25/23    -S/p Stage 2 on 10/30/23 - monitored overnight for hypoxia 2/2 bronchospasm  -completed Keflex 500mg BID   -Continue Rytary 145mg 3 tabs at 8a, 12pm, 4pm and 8pm  -Continue Neupro patch 3mg /24hours daily [ Home Neupro patch 4mg /24, confirmed with wife]  -Movement disorders following - DBS programming today     Parkinson's related non-motor symptoms    #Mood/anxiety  -Continue Lexapro 20mg daily  -Continue Clonazepam 0.5mg BID PRN  Neuropsychology to follow    #Orthostatic hypotension  -Monitor OH vital signs    #Pain control  - Tylenol PRN  - Lidocaine patches to b/l knees  -Continue gabapentin 100mg BID    #GI/Bowel Management/Constipation  - Continue Senna at bedtime   - Miralax BID    - Bisacodyl PRN  - Pepcid 20mg daily    #Bladder management  - Continue to monitor PVR q 8 hours (SC if > 400)  - Monitor UO, voiding without issues     Concurrent medical problems    #DVT Prophylaxis  - TEDs   - pharmacologic ppx contraindicated given recent surgery    #Skin:  -3 surgical sites on top of skull. Clean dry and intact. Staples present. No fluctuance, erythema, drainage, or oozing at time of exam.  -Do not remove dressings   -Can shower 2 day post-op but do not scrub incision  -Start hydorcortisone topical to rash BID 11/7    FEN   - Diet - Regular with thins [CCHO, DASH/TLC]    - Dysphagia  SLP - evaluation and treatment appreciated     Precautions / PROPHYLAXIS:   - Falls  - ortho: Weight bearing status: WBAT   - Lungs: Aspiration, Incentive Spirometer   - Pressure injury/Skin: Turn Q2hrs while in bed, OOB to Chair, PT/OT      EDOD  11/14/23

## 2023-11-07 NOTE — PROGRESS NOTE ADULT - SUBJECTIVE AND OBJECTIVE BOX
Movement Disorders Neurology  Consult Progress Note    Interval history:  Patient seen and examined at bedside, wife present. He had a good day today, and was able to do more in therapy. He is sleeping well. Cognition seems clearer as well.     HPI:    Patient is a 62yo M with history of severe Parkinson's disease (dx in 2015), HTN, anxiety, who presented to [facility] 10/25 for DBS, now s/p Stage 1 and Stage 2 DBS. The patient follows with Dr. Gil as an outpatient.     He was diagnosed with Parkinson's disease in 2015 when he was having dragging of left leg, and difficulty moving. He was started on levodopa and it worked well for a few years. He now has B/L stiffness, and slowness, but no tremor. He has severe on/off motor fluctuations. He has sudden severe wearing offs of medications, and effects of medication lasts 1-2 hours then he has severe stiffness and slowness. He experiencing freezing of gait, and he can't speak or think at those times, but has not fallen.     His wife describes an episode where he suddenly went OFF and was gripping the walker tightly. She tries to tell him what to do to be safe, but states that he does his own thing. She denies him being impulsive.    He walks with walker most of the time, and he can usually do his own bathing/dressing/toileting/feeding most days, but sometimes needs help. The patient's wife is the primary caregiver, but also works.     After Stage II, post-operatively, he became hypoxic and required monitoring overnight due to bronchospasm. Patient was evaluated by PM&R and therapy for functional deficits and gait/ ADL impairments and recommended acute rehabilitation. Patient was medically optimized for discharge to [facility] on 10/31.    The patient's wife denies a history of constipation.   She states sometimes he gets confused  She denies a history of talking in sleep or acting out dreams.   No dizziness/lightheadedness when standing.   He has slight tremors in the right hand but tremors are not his predominant issue  They tried increasing his Rytary to 4 capsules 4 times a day but he did not do well with this and became more confused.   His wife notes that sometimes she gives him an extra capsule as needed    Current PD meds:  Lexapro 20mg daily  Gabapentin 100mg daily and at bedtime  Neupro 3mg daily  Rytary 145mg 3 capsules at 9x-65c-0b-8p  Clonazepam 0.5mg BID PRN  Dulxolax, Miralax, Senna    Physical examination:  Patient is awake and alert, participates in examination. Follows simple commands.  able to answer the name, month, and president correctly  Face is mildly masked. Voice is mild-moderately hypophonic and dysarthria has improved  No tremors  Mild rigidity bilaterally  Mild bradykinesia, left more than right  Gait deferred      DBS programming done 11/6/23  First programming   Left GPi target    Current setting  Contact 2  Setting : 1.5/60/125    Based  on brain sense Contact 1 and contact 2 were choosen    Contact 1: tried as high of stimulation of 4 at which he was able to intiate movement better, one person assist to get up from sitting position, good stride lenght, speech was better  Contact 2, stimulation of 1.5 , able to get from sitting position by himself, initiation of movement better, speech better, bradykinesia improved , walked the entire hallway  higher stim of > 1.5 on contact 2, did not show any improvement in bradykinesia, speech and gait

## 2023-11-07 NOTE — PROGRESS NOTE ADULT - SUBJECTIVE AND OBJECTIVE BOX
SUBJECTIVE/ROS: patient evaluated while in the chair. Noticed that he has a erythematous rash on his chest. He states its not bothersome or itchy. He denies chest pain, fever, chills, nausea, vomiting, abdominal pain, headache, or BLE pain.     HPI:  Patient is a 62yo M with history of severe Parkinson's disease (dx in 2015), HTN, anxiety, who presented to [facility] 10/25 for DBS, now s/p Stage 1 and Stage 2 DBS.He was diagnosed with Parkinson's disease in 2015 when he was having dragging of left leg, and difficulty moving. He was started on levodopa and it worked well for a few years. He now has B/L stiffness, and slowness, but no tremor. He is also experiencing neck discomfort and tilting of his neck forward. He has severe on/off motor fluctuations. He has sudden severe wearing offs of medications, and effects of medication lasts 1-2 hours then he has severe stiffness and slowness. He moves his neck better when he takes the medication. He experiencing freezing of gait, and he can't speak or think at those times. He walks with walker most of the time, and he can usually do his own bathing/dressing/toileting/feeding most days, but sometimes needs help. He is s/p Stage 1 Deep Brain Surgery Bilateral Implantation Into Globus Pallidus Internus With Leksell Frame on 10/25/23 and plan for Stage 2 on 10/30/23. He tolerated procedure well. He was evaluated for admission to acute inpatient rehab and admitted to [facility] on 10/27/23. Subsequently transferred to [facility] for Stage 2 DBS 10/30. Post-operatively, he became hypoxic and required monitoring overnight due to bronchospasm. Patient was evaluated by PM&R and therapy for functional deficits and gait/ ADL impairments and recommended acute rehabilitation. Patient was medically optimized for discharge to [facility] on 10/31.          PHYSICAL EXAM    Vital Signs Last 24 Hrs  T(C): 36.4 (07 Nov 2023 08:46), Max: 36.7 (06 Nov 2023 20:00)  T(F): 97.5 (07 Nov 2023 08:46), Max: 98 (06 Nov 2023 20:00)  HR: 75 (07 Nov 2023 08:46) (75 - 77)  BP: 133/87 (07 Nov 2023 08:46) (133/87 - 156/90)  BP(mean): --  RR: 16 (06 Nov 2023 20:00) (16 - 16)  SpO2: 97% (07 Nov 2023 08:46) (96% - 97%)    Parameters below as of 06 Nov 2023 20:00  Patient On (Oxygen Delivery Method): room air      PHYSICAL EXAM  Constitutional - NAD, Comfortable  HEENT - NCAT, EOMI  Neck - Supple, No limited ROM  Chest - CTA bilaterally  Cardiovascular - RRR, S1S2  Abdomen -BS+, Soft, NTND  Extremities - No C/C/E, No calf tenderness   Neurologic Exam -aox2-3, lopez 5/5, hypophonic, bradykinetic     RECENT LABS:                          12.4   8.74  )-----------( 375      ( 06 Nov 2023 06:46 )             38.8     11-06    137  |  103  |  17  ----------------------------<  115<H>  3.6   |  29  |  0.66    Ca    8.5      06 Nov 2023 06:46    TPro  6.3  /  Alb  3.0<L>  /  TBili  0.4  /  DBili  x   /  AST  11  /  ALT  10  /  AlkPhos  74  11-06    LIVER FUNCTIONS - ( 06 Nov 2023 06:46 )  Alb: 3.0 g/dL / Pro: 6.3 g/dL / ALK PHOS: 74 U/L / ALT: 10 U/L / AST: 11 U/L / GGT: x                   MEDICATIONS  (STANDING):  carbidopa 36.25 mG/levodopa 145 mG ER 3 Capsule(s) Oral <User Schedule>  erythromycin   Ointment 1 Application(s) Both EYES four times a day  escitalopram 20 milliGRAM(s) Oral daily  famotidine    Tablet 20 milliGRAM(s) Oral daily  gabapentin 100 milliGRAM(s) Oral daily  gabapentin 100 milliGRAM(s) Oral at bedtime  hydrocortisone 1% Cream 1 Application(s) Topical two times a day  lidocaine   4% Patch 2 Patch Transdermal <User Schedule>  mirtazapine 7.5 milliGRAM(s) Oral at bedtime  polyethylene glycol 3350 17 Gram(s) Oral two times a day  rivastigmine 1.5 milliGRAM(s) Oral two times a day  rotigotine patch 3 mG/24 Hr(s) 1 Patch Transdermal <User Schedule>  senna 2 Tablet(s) Oral at bedtime    MEDICATIONS  (PRN):  acetaminophen     Tablet .. 650 milliGRAM(s) Oral every 6 hours PRN Mild Pain (1 - 3)  bisacodyl 5 milliGRAM(s) Oral at bedtime PRN Constipation  clonazePAM  Tablet 0.5 milliGRAM(s) Oral two times a day PRN for anxiety

## 2023-11-08 PROCEDURE — 99232 SBSQ HOSP IP/OBS MODERATE 35: CPT

## 2023-11-08 RX ADMIN — CARBIDOPA AND LEVODOPA 3 CAPSULE(S): 25; 100 TABLET ORAL at 08:32

## 2023-11-08 RX ADMIN — LIDOCAINE 2 PATCH: 4 CREAM TOPICAL at 08:32

## 2023-11-08 RX ADMIN — FAMOTIDINE 20 MILLIGRAM(S): 10 INJECTION INTRAVENOUS at 13:25

## 2023-11-08 RX ADMIN — Medication 1 APPLICATION(S): at 23:27

## 2023-11-08 RX ADMIN — CARBIDOPA AND LEVODOPA 3 CAPSULE(S): 25; 100 TABLET ORAL at 19:54

## 2023-11-08 RX ADMIN — RIVASTIGMINE 1.5 MILLIGRAM(S): 4.6 PATCH, EXTENDED RELEASE TRANSDERMAL at 18:38

## 2023-11-08 RX ADMIN — Medication 1 APPLICATION(S): at 05:19

## 2023-11-08 RX ADMIN — ROTIGOTINE 1 PATCH: 8 PATCH, EXTENDED RELEASE TRANSDERMAL at 16:30

## 2023-11-08 RX ADMIN — MIRTAZAPINE 7.5 MILLIGRAM(S): 45 TABLET, ORALLY DISINTEGRATING ORAL at 22:36

## 2023-11-08 RX ADMIN — POLYETHYLENE GLYCOL 3350 17 GRAM(S): 17 POWDER, FOR SOLUTION ORAL at 18:38

## 2023-11-08 RX ADMIN — Medication 1 APPLICATION(S): at 18:38

## 2023-11-08 RX ADMIN — Medication 1 APPLICATION(S): at 13:24

## 2023-11-08 RX ADMIN — CARBIDOPA AND LEVODOPA 3 CAPSULE(S): 25; 100 TABLET ORAL at 16:30

## 2023-11-08 RX ADMIN — Medication 1 APPLICATION(S): at 19:11

## 2023-11-08 RX ADMIN — GABAPENTIN 100 MILLIGRAM(S): 400 CAPSULE ORAL at 22:36

## 2023-11-08 RX ADMIN — RIVASTIGMINE 1.5 MILLIGRAM(S): 4.6 PATCH, EXTENDED RELEASE TRANSDERMAL at 05:19

## 2023-11-08 RX ADMIN — ROTIGOTINE 1 PATCH: 8 PATCH, EXTENDED RELEASE TRANSDERMAL at 08:06

## 2023-11-08 RX ADMIN — ESCITALOPRAM OXALATE 20 MILLIGRAM(S): 10 TABLET, FILM COATED ORAL at 13:25

## 2023-11-08 RX ADMIN — CARBIDOPA AND LEVODOPA 3 CAPSULE(S): 25; 100 TABLET ORAL at 11:34

## 2023-11-08 RX ADMIN — GABAPENTIN 100 MILLIGRAM(S): 400 CAPSULE ORAL at 13:23

## 2023-11-08 NOTE — PROGRESS NOTE ADULT - ASSESSMENT
Patient is a 62yo M with history of severe Parkinson's disease (dx in 2015), HTN, anxiety, who presented to [facility] 10/25 for DBS, now s/p Stage 1 and Stage 2 DBS. Hospital course complicated by brochospasm requiring overnight observation postoperatively, also now with dysphagia. Admitted to [place] acute inpatient rehab on 10/31 for ADL, gait, and functional impairments.     #Parkinson's Disease now with gait Instability, ADL impairments and Functional impairments  - Continue Comprehensive Rehab Program of PT/OT/SLP      Parkinson's related motor symptoms    #Rigidity/Bradykinesia/wearing off  -S/p Stage 1 Deep Brain Surgery Bilateral Implantation Into Globus Pallidus Internus With Leksell Frame on 10/25/23    -S/p Stage 2 on 10/30/23 - monitored overnight for hypoxia 2/2 bronchospasm  -completed Keflex 500mg BID   -Continue Rytary 145mg 3 tabs at 8a, 12pm, 4pm and 8pm  -Continue Neupro patch 3mg /24hours daily [ Home Neupro patch 4mg /24, confirmed with wife]  -Movement disorders following - DBS programming today     Parkinson's related non-motor symptoms    #Mood/anxiety  -Continue Lexapro 20mg daily  -Continue Clonazepam 0.5mg BID PRN  Neuropsychology to follow    #Orthostatic hypotension  -Monitor OH vital signs    #Pain control  - Tylenol PRN  - Lidocaine patches to b/l knees  -Continue gabapentin 100mg BID    #GI/Bowel Management/Constipation  - Continue Senna at bedtime   - Miralax BID    - Bisacodyl PRN  - Pepcid 20mg daily    #Bladder management  - Continue to monitor PVR q 8 hours (SC if > 400)  - Monitor UO, voiding without issues     Concurrent medical problems    #DVT Prophylaxis  - TEDs   - pharmacologic ppx contraindicated given recent surgery    #Skin:  -3 surgical sites on top of skull. Clean dry and intact. Staples present. No fluctuance, erythema, drainage, or oozing at time of exam.  -Do not remove dressings   -Can shower 2 day post-op but do not scrub incision  -Start hydorcortisone topical to rash BID 11/7    FEN   - Diet - Regular with thins [CCHO, DASH/TLC]    - Dysphagia  SLP - evaluation and treatment appreciated     Precautions / PROPHYLAXIS:   - Falls  - ortho: Weight bearing status: WBAT   - Lungs: Aspiration, Incentive Spirometer   - Pressure injury/Skin: Turn Q2hrs while in bed, OOB to Chair, PT/OT      EDOD  11/14/23       Patient is a 62yo M with history of severe Parkinson's disease (dx in 2015), HTN, anxiety, who presented to [facility] 10/25 for DBS, now s/p Stage 1 and Stage 2 DBS. Hospital course complicated by brochospasm requiring overnight observation postoperatively, also now with dysphagia. Admitted to [place] acute inpatient rehab on 10/31 for ADL, gait, and functional impairments.     #Parkinson's Disease now with gait Instability, ADL impairments and Functional impairments  - Continue Comprehensive Rehab Program of PT/OT/SLP      Parkinson's related motor symptoms    #Rigidity/Bradykinesia/wearing off  -S/p Stage 1 Deep Brain Surgery Bilateral Implantation Into Globus Pallidus Internus With Leksell Frame on 10/25/23    -S/p Stage 2 on 10/30/23 - monitored overnight for hypoxia 2/2 bronchospasm  -completed Keflex 500mg BID   -Continue Rytary 145mg 3 tabs at 8a, 12pm, 4pm and 8pm  -Continue Neupro patch 3mg /24hours every alternate day  -Movement disorders following - DBS programming done on 11/6    Parkinson's related non-motor symptoms    #Mood/anxiety  -Continue Lexapro 20mg daily  -Continue Clonazepam 0.5mg BID PRN  Neuropsychology to follow    #Orthostatic hypotension  -Monitor OH vital signs    #Pain control  - Tylenol PRN  - Lidocaine patches to b/l knees  -Continue gabapentin 100mg BID    #GI/Bowel Management/Constipation  - Continue Senna at bedtime   - Miralax BID    - Bisacodyl PRN  - Pepcid 20mg daily    #Bladder management  - Continue to monitor PVR q 8 hours (SC if > 400)  - Monitor UO, voiding without issues     Concurrent medical problems    #DVT Prophylaxis  - TEDs   - pharmacologic ppx contraindicated given recent surgery    #Skin:  -3 surgical sites on top of skull. Clean dry and intact. Staples present. No fluctuance, erythema, drainage, or oozing at time of exam.  -Do not remove dressings   -Can shower 2 day post-op but do not scrub incision  -Start hydorcortisone topical to rash BID 11/7    FEN   - Diet - Regular with thins [CCHO, DASH/TLC]    - Dysphagia  SLP - evaluation and treatment appreciated     Precautions / PROPHYLAXIS:   - Falls  - ortho: Weight bearing status: WBAT   - Lungs: Aspiration, Incentive Spirometer   - Pressure injury/Skin: Turn Q2hrs while in bed, OOB to Chair, PT/OT      EDOD  11/14/23

## 2023-11-08 NOTE — PROGRESS NOTE ADULT - SUBJECTIVE AND OBJECTIVE BOX
SUBJECTIVE/ROS:  Patient seen in therapy   doing well in therapy, drooling also imporved after DBS programming on 11/6      HPI:  Patient is a 64yo M with history of severe Parkinson's disease (dx in 2015), HTN, anxiety, who presented to [facility] 10/25 for DBS, now s/p Stage 1 and Stage 2 DBS.He was diagnosed with Parkinson's disease in 2015 when he was having dragging of left leg, and difficulty moving. He was started on levodopa and it worked well for a few years. He now has B/L stiffness, and slowness, but no tremor. He is also experiencing neck discomfort and tilting of his neck forward. He has severe on/off motor fluctuations. He has sudden severe wearing offs of medications, and effects of medication lasts 1-2 hours then he has severe stiffness and slowness. He moves his neck better when he takes the medication. He experiencing freezing of gait, and he can't speak or think at those times. He walks with walker most of the time, and he can usually do his own bathing/dressing/toileting/feeding most days, but sometimes needs help. He is s/p Stage 1 Deep Brain Surgery Bilateral Implantation Into Globus Pallidus Internus With Leksell Frame on 10/25/23 and plan for Stage 2 on 10/30/23. He tolerated procedure well. He was evaluated for admission to acute inpatient rehab and admitted to [facility] on 10/27/23. Subsequently transferred to [facility] for Stage 2 DBS 10/30. Post-operatively, he became hypoxic and required monitoring overnight due to bronchospasm. Patient was evaluated by PM&R and therapy for functional deficits and gait/ ADL impairments and recommended acute rehabilitation. Patient was medically optimized for discharge to [facility] on 10/31.          PHYSICAL EXAM    Vital Signs Last 24 Hrs  T(C): 36.4 (07 Nov 2023 08:46), Max: 36.7 (06 Nov 2023 20:00)  T(F): 97.5 (07 Nov 2023 08:46), Max: 98 (06 Nov 2023 20:00)  HR: 75 (07 Nov 2023 08:46) (75 - 77)  BP: 133/87 (07 Nov 2023 08:46) (133/87 - 156/90)  BP(mean): --  RR: 16 (06 Nov 2023 20:00) (16 - 16)  SpO2: 97% (07 Nov 2023 08:46) (96% - 97%)    Parameters below as of 06 Nov 2023 20:00  Patient On (Oxygen Delivery Method): room air      PHYSICAL EXAM  Constitutional - NAD, Comfortable  HEENT - NCAT, EOMI  Neck - Supple, No limited ROM  Chest - CTA bilaterally  Cardiovascular - RRR,   Abdomen -BS+, Soft, NTND  Extremities - No C/C/E, No calf tenderness   Neurologic Exam -  alert oriented to time place situation  severe hypophonia  bradykinesia grade 1 b/l  mild rigidity noted        RECENT LABS:                          12.4   8.74  )-----------( 375      ( 06 Nov 2023 06:46 )             38.8     11-06    137  |  103  |  17  ----------------------------<  115<H>  3.6   |  29  |  0.66    Ca    8.5      06 Nov 2023 06:46    TPro  6.3  /  Alb  3.0<L>  /  TBili  0.4  /  DBili  x   /  AST  11  /  ALT  10  /  AlkPhos  74  11-06    LIVER FUNCTIONS - ( 06 Nov 2023 06:46 )  Alb: 3.0 g/dL / Pro: 6.3 g/dL / ALK PHOS: 74 U/L / ALT: 10 U/L / AST: 11 U/L / GGT: x                   MEDICATIONS  (STANDING):  carbidopa 36.25 mG/levodopa 145 mG ER 3 Capsule(s) Oral <User Schedule>  erythromycin   Ointment 1 Application(s) Both EYES four times a day  escitalopram 20 milliGRAM(s) Oral daily  famotidine    Tablet 20 milliGRAM(s) Oral daily  gabapentin 100 milliGRAM(s) Oral daily  gabapentin 100 milliGRAM(s) Oral at bedtime  hydrocortisone 1% Cream 1 Application(s) Topical two times a day  lidocaine   4% Patch 2 Patch Transdermal <User Schedule>  mirtazapine 7.5 milliGRAM(s) Oral at bedtime  polyethylene glycol 3350 17 Gram(s) Oral two times a day  rivastigmine 1.5 milliGRAM(s) Oral two times a day  rotigotine patch 3 mG/24 Hr(s) 1 Patch Transdermal <User Schedule>  senna 2 Tablet(s) Oral at bedtime    MEDICATIONS  (PRN):  acetaminophen     Tablet .. 650 milliGRAM(s) Oral every 6 hours PRN Mild Pain (1 - 3)  bisacodyl 5 milliGRAM(s) Oral at bedtime PRN Constipation  clonazePAM  Tablet 0.5 milliGRAM(s) Oral two times a day PRN for anxiety   SUBJECTIVE/ROS:  Patient seen in therapy   had one episode of vomitting last night ,after night time meds were given  doing well in therapy, drooling also improved after DBS programming on 11/6      HPI:  Patient is a 62yo M with history of severe Parkinson's disease (dx in 2015), HTN, anxiety, who presented to [facility] 10/25 for DBS, now s/p Stage 1 and Stage 2 DBS.He was diagnosed with Parkinson's disease in 2015 when he was having dragging of left leg, and difficulty moving. He was started on levodopa and it worked well for a few years. He now has B/L stiffness, and slowness, but no tremor. He is also experiencing neck discomfort and tilting of his neck forward. He has severe on/off motor fluctuations. He has sudden severe wearing offs of medications, and effects of medication lasts 1-2 hours then he has severe stiffness and slowness. He moves his neck better when he takes the medication. He experiencing freezing of gait, and he can't speak or think at those times. He walks with walker most of the time, and he can usually do his own bathing/dressing/toileting/feeding most days, but sometimes needs help. He is s/p Stage 1 Deep Brain Surgery Bilateral Implantation Into Globus Pallidus Internus With Leksell Frame on 10/25/23 and plan for Stage 2 on 10/30/23. He tolerated procedure well. He was evaluated for admission to acute inpatient rehab and admitted to [facility] on 10/27/23. Subsequently transferred to [facility] for Stage 2 DBS 10/30. Post-operatively, he became hypoxic and required monitoring overnight due to bronchospasm. Patient was evaluated by PM&R and therapy for functional deficits and gait/ ADL impairments and recommended acute rehabilitation. Patient was medically optimized for discharge to [facility] on 10/31.          PHYSICAL EXAM    Vital Signs Last 24 Hrs  T(C): 36.4 (07 Nov 2023 08:46), Max: 36.7 (06 Nov 2023 20:00)  T(F): 97.5 (07 Nov 2023 08:46), Max: 98 (06 Nov 2023 20:00)  HR: 75 (07 Nov 2023 08:46) (75 - 77)  BP: 133/87 (07 Nov 2023 08:46) (133/87 - 156/90)  BP(mean): --  RR: 16 (06 Nov 2023 20:00) (16 - 16)  SpO2: 97% (07 Nov 2023 08:46) (96% - 97%)    Parameters below as of 06 Nov 2023 20:00  Patient On (Oxygen Delivery Method): room air      PHYSICAL EXAM  Constitutional - NAD, Comfortable  HEENT - NCAT, EOMI  Neck - Supple, No limited ROM  Chest - CTA bilaterally  Cardiovascular - RRR,   Abdomen -BS+, Soft, NTND  Extremities - No C/C/E, No calf tenderness   Neurologic Exam -  alert oriented to time place situation  severe hypophonia  bradykinesia grade 1 b/l  mild rigidity noted        RECENT LABS:                          12.4   8.74  )-----------( 375      ( 06 Nov 2023 06:46 )             38.8     11-06    137  |  103  |  17  ----------------------------<  115<H>  3.6   |  29  |  0.66    Ca    8.5      06 Nov 2023 06:46    TPro  6.3  /  Alb  3.0<L>  /  TBili  0.4  /  DBili  x   /  AST  11  /  ALT  10  /  AlkPhos  74  11-06    LIVER FUNCTIONS - ( 06 Nov 2023 06:46 )  Alb: 3.0 g/dL / Pro: 6.3 g/dL / ALK PHOS: 74 U/L / ALT: 10 U/L / AST: 11 U/L / GGT: x                   MEDICATIONS  (STANDING):  carbidopa 36.25 mG/levodopa 145 mG ER 3 Capsule(s) Oral <User Schedule>  erythromycin   Ointment 1 Application(s) Both EYES four times a day  escitalopram 20 milliGRAM(s) Oral daily  famotidine    Tablet 20 milliGRAM(s) Oral daily  gabapentin 100 milliGRAM(s) Oral daily  gabapentin 100 milliGRAM(s) Oral at bedtime  hydrocortisone 1% Cream 1 Application(s) Topical two times a day  lidocaine   4% Patch 2 Patch Transdermal <User Schedule>  mirtazapine 7.5 milliGRAM(s) Oral at bedtime  polyethylene glycol 3350 17 Gram(s) Oral two times a day  rivastigmine 1.5 milliGRAM(s) Oral two times a day  rotigotine patch 3 mG/24 Hr(s) 1 Patch Transdermal <User Schedule>  senna 2 Tablet(s) Oral at bedtime    MEDICATIONS  (PRN):  acetaminophen     Tablet .. 650 milliGRAM(s) Oral every 6 hours PRN Mild Pain (1 - 3)  bisacodyl 5 milliGRAM(s) Oral at bedtime PRN Constipation  clonazePAM  Tablet 0.5 milliGRAM(s) Oral two times a day PRN for anxiety

## 2023-11-08 NOTE — PROGRESS NOTE ADULT - ATTENDING COMMENTS
I agree with the Fellow's assessment and plan as outlined above. Patient's L GPI was programmed with improvement in facial expression, gait and balance. Will continue to monitor. Goal to taper off Neupro and continue Rytary home dosing for now. Will continue to monitor.
I have personally seen and examined the patient with the fellow. Medical records reviewed. I have made amendments to the documentation where necessary and adjusted the history, physical examination, and plan as documented by the fellow.    Wife at bedside - update given   Movement Disorder Neurology followup for DBS programming timing
I have personally seen and examined the patient with the fellow. Medical records reviewed. I have made amendments to the documentation where necessary and adjusted the history, physical examination, and plan as documented by the fellow.

## 2023-11-09 LAB
ALBUMIN SERPL ELPH-MCNC: 3 G/DL — LOW (ref 3.3–5)
ALBUMIN SERPL ELPH-MCNC: 3 G/DL — LOW (ref 3.3–5)
ALP SERPL-CCNC: 79 U/L — SIGNIFICANT CHANGE UP (ref 40–120)
ALP SERPL-CCNC: 79 U/L — SIGNIFICANT CHANGE UP (ref 40–120)
ALT FLD-CCNC: 9 U/L — LOW (ref 10–45)
ALT FLD-CCNC: 9 U/L — LOW (ref 10–45)
ANION GAP SERPL CALC-SCNC: 7 MMOL/L — SIGNIFICANT CHANGE UP (ref 5–17)
ANION GAP SERPL CALC-SCNC: 7 MMOL/L — SIGNIFICANT CHANGE UP (ref 5–17)
AST SERPL-CCNC: 12 U/L — SIGNIFICANT CHANGE UP (ref 10–40)
AST SERPL-CCNC: 12 U/L — SIGNIFICANT CHANGE UP (ref 10–40)
BILIRUB SERPL-MCNC: 0.4 MG/DL — SIGNIFICANT CHANGE UP (ref 0.2–1.2)
BILIRUB SERPL-MCNC: 0.4 MG/DL — SIGNIFICANT CHANGE UP (ref 0.2–1.2)
BUN SERPL-MCNC: 23 MG/DL — SIGNIFICANT CHANGE UP (ref 7–23)
BUN SERPL-MCNC: 23 MG/DL — SIGNIFICANT CHANGE UP (ref 7–23)
CALCIUM SERPL-MCNC: 8.6 MG/DL — SIGNIFICANT CHANGE UP (ref 8.4–10.5)
CALCIUM SERPL-MCNC: 8.6 MG/DL — SIGNIFICANT CHANGE UP (ref 8.4–10.5)
CHLORIDE SERPL-SCNC: 105 MMOL/L — SIGNIFICANT CHANGE UP (ref 96–108)
CHLORIDE SERPL-SCNC: 105 MMOL/L — SIGNIFICANT CHANGE UP (ref 96–108)
CO2 SERPL-SCNC: 27 MMOL/L — SIGNIFICANT CHANGE UP (ref 22–31)
CO2 SERPL-SCNC: 27 MMOL/L — SIGNIFICANT CHANGE UP (ref 22–31)
CREAT SERPL-MCNC: 0.79 MG/DL — SIGNIFICANT CHANGE UP (ref 0.5–1.3)
CREAT SERPL-MCNC: 0.79 MG/DL — SIGNIFICANT CHANGE UP (ref 0.5–1.3)
EGFR: 100 ML/MIN/1.73M2 — SIGNIFICANT CHANGE UP
EGFR: 100 ML/MIN/1.73M2 — SIGNIFICANT CHANGE UP
GLUCOSE SERPL-MCNC: 115 MG/DL — HIGH (ref 70–99)
GLUCOSE SERPL-MCNC: 115 MG/DL — HIGH (ref 70–99)
HCT VFR BLD CALC: 40.5 % — SIGNIFICANT CHANGE UP (ref 39–50)
HCT VFR BLD CALC: 40.5 % — SIGNIFICANT CHANGE UP (ref 39–50)
HGB BLD-MCNC: 12.4 G/DL — LOW (ref 13–17)
HGB BLD-MCNC: 12.4 G/DL — LOW (ref 13–17)
MCHC RBC-ENTMCNC: 29.1 PG — SIGNIFICANT CHANGE UP (ref 27–34)
MCHC RBC-ENTMCNC: 29.1 PG — SIGNIFICANT CHANGE UP (ref 27–34)
MCHC RBC-ENTMCNC: 30.6 GM/DL — LOW (ref 32–36)
MCHC RBC-ENTMCNC: 30.6 GM/DL — LOW (ref 32–36)
MCV RBC AUTO: 95.1 FL — SIGNIFICANT CHANGE UP (ref 80–100)
MCV RBC AUTO: 95.1 FL — SIGNIFICANT CHANGE UP (ref 80–100)
NRBC # BLD: 0 /100 WBCS — SIGNIFICANT CHANGE UP (ref 0–0)
NRBC # BLD: 0 /100 WBCS — SIGNIFICANT CHANGE UP (ref 0–0)
PLATELET # BLD AUTO: 379 K/UL — SIGNIFICANT CHANGE UP (ref 150–400)
PLATELET # BLD AUTO: 379 K/UL — SIGNIFICANT CHANGE UP (ref 150–400)
POTASSIUM SERPL-MCNC: 3.7 MMOL/L — SIGNIFICANT CHANGE UP (ref 3.5–5.3)
POTASSIUM SERPL-MCNC: 3.7 MMOL/L — SIGNIFICANT CHANGE UP (ref 3.5–5.3)
POTASSIUM SERPL-SCNC: 3.7 MMOL/L — SIGNIFICANT CHANGE UP (ref 3.5–5.3)
POTASSIUM SERPL-SCNC: 3.7 MMOL/L — SIGNIFICANT CHANGE UP (ref 3.5–5.3)
PROT SERPL-MCNC: 6.2 G/DL — SIGNIFICANT CHANGE UP (ref 6–8.3)
PROT SERPL-MCNC: 6.2 G/DL — SIGNIFICANT CHANGE UP (ref 6–8.3)
RBC # BLD: 4.26 M/UL — SIGNIFICANT CHANGE UP (ref 4.2–5.8)
RBC # BLD: 4.26 M/UL — SIGNIFICANT CHANGE UP (ref 4.2–5.8)
RBC # FLD: 13.6 % — SIGNIFICANT CHANGE UP (ref 10.3–14.5)
RBC # FLD: 13.6 % — SIGNIFICANT CHANGE UP (ref 10.3–14.5)
SODIUM SERPL-SCNC: 139 MMOL/L — SIGNIFICANT CHANGE UP (ref 135–145)
SODIUM SERPL-SCNC: 139 MMOL/L — SIGNIFICANT CHANGE UP (ref 135–145)
WBC # BLD: 10.32 K/UL — SIGNIFICANT CHANGE UP (ref 3.8–10.5)
WBC # BLD: 10.32 K/UL — SIGNIFICANT CHANGE UP (ref 3.8–10.5)
WBC # FLD AUTO: 10.32 K/UL — SIGNIFICANT CHANGE UP (ref 3.8–10.5)
WBC # FLD AUTO: 10.32 K/UL — SIGNIFICANT CHANGE UP (ref 3.8–10.5)

## 2023-11-09 PROCEDURE — 99233 SBSQ HOSP IP/OBS HIGH 50: CPT

## 2023-11-09 PROCEDURE — 99232 SBSQ HOSP IP/OBS MODERATE 35: CPT

## 2023-11-09 RX ORDER — LANOLIN ALCOHOL/MO/W.PET/CERES
3 CREAM (GRAM) TOPICAL AT BEDTIME
Refills: 0 | Status: DISCONTINUED | OUTPATIENT
Start: 2023-11-09 | End: 2023-11-14

## 2023-11-09 RX ORDER — CLONAZEPAM 1 MG
0.5 TABLET ORAL
Refills: 0 | Status: DISCONTINUED | OUTPATIENT
Start: 2023-11-09 | End: 2023-11-14

## 2023-11-09 RX ORDER — CLONAZEPAM 1 MG
0.25 TABLET ORAL AT BEDTIME
Refills: 0 | Status: DISCONTINUED | OUTPATIENT
Start: 2023-11-09 | End: 2023-11-14

## 2023-11-09 RX ADMIN — CARBIDOPA AND LEVODOPA 3 CAPSULE(S): 25; 100 TABLET ORAL at 16:21

## 2023-11-09 RX ADMIN — Medication 650 MILLIGRAM(S): at 06:21

## 2023-11-09 RX ADMIN — Medication 1 APPLICATION(S): at 12:35

## 2023-11-09 RX ADMIN — Medication 1 APPLICATION(S): at 05:48

## 2023-11-09 RX ADMIN — Medication 3 MILLIGRAM(S): at 21:17

## 2023-11-09 RX ADMIN — SENNA PLUS 2 TABLET(S): 8.6 TABLET ORAL at 21:16

## 2023-11-09 RX ADMIN — CARBIDOPA AND LEVODOPA 3 CAPSULE(S): 25; 100 TABLET ORAL at 20:11

## 2023-11-09 RX ADMIN — FAMOTIDINE 20 MILLIGRAM(S): 10 INJECTION INTRAVENOUS at 12:35

## 2023-11-09 RX ADMIN — CARBIDOPA AND LEVODOPA 3 CAPSULE(S): 25; 100 TABLET ORAL at 12:33

## 2023-11-09 RX ADMIN — ROTIGOTINE 1 PATCH: 8 PATCH, EXTENDED RELEASE TRANSDERMAL at 20:23

## 2023-11-09 RX ADMIN — POLYETHYLENE GLYCOL 3350 17 GRAM(S): 17 POWDER, FOR SOLUTION ORAL at 05:50

## 2023-11-09 RX ADMIN — MIRTAZAPINE 7.5 MILLIGRAM(S): 45 TABLET, ORALLY DISINTEGRATING ORAL at 21:16

## 2023-11-09 RX ADMIN — RIVASTIGMINE 1.5 MILLIGRAM(S): 4.6 PATCH, EXTENDED RELEASE TRANSDERMAL at 17:41

## 2023-11-09 RX ADMIN — Medication 1 APPLICATION(S): at 05:49

## 2023-11-09 RX ADMIN — Medication 1 APPLICATION(S): at 17:40

## 2023-11-09 RX ADMIN — RIVASTIGMINE 1.5 MILLIGRAM(S): 4.6 PATCH, EXTENDED RELEASE TRANSDERMAL at 05:49

## 2023-11-09 RX ADMIN — GABAPENTIN 100 MILLIGRAM(S): 400 CAPSULE ORAL at 12:32

## 2023-11-09 RX ADMIN — ESCITALOPRAM OXALATE 20 MILLIGRAM(S): 10 TABLET, FILM COATED ORAL at 12:35

## 2023-11-09 RX ADMIN — Medication 650 MILLIGRAM(S): at 05:51

## 2023-11-09 RX ADMIN — GABAPENTIN 100 MILLIGRAM(S): 400 CAPSULE ORAL at 21:17

## 2023-11-09 RX ADMIN — ROTIGOTINE 1 PATCH: 8 PATCH, EXTENDED RELEASE TRANSDERMAL at 16:51

## 2023-11-09 RX ADMIN — Medication 0.25 MILLIGRAM(S): at 21:17

## 2023-11-09 RX ADMIN — LIDOCAINE 2 PATCH: 4 CREAM TOPICAL at 07:46

## 2023-11-09 RX ADMIN — CARBIDOPA AND LEVODOPA 3 CAPSULE(S): 25; 100 TABLET ORAL at 08:44

## 2023-11-09 NOTE — PROGRESS NOTE ADULT - SUBJECTIVE AND OBJECTIVE BOX
Movement Disorders Neurology  Consult Progress Note    Interval history:  Patient seen and examined at bedside. He states he feel OFF today. He did not receive Neupro yesterday.     HPI:    Patient is a 64yo M with history of severe Parkinson's disease (dx in 2015), HTN, anxiety, who presented to [facility] 10/25 for DBS, now s/p Stage 1 and Stage 2 DBS. The patient follows with Dr. Gil as an outpatient.     He was diagnosed with Parkinson's disease in 2015 when he was having dragging of left leg, and difficulty moving. He was started on levodopa and it worked well for a few years. He now has B/L stiffness, and slowness, but no tremor. He has severe on/off motor fluctuations. He has sudden severe wearing offs of medications, and effects of medication lasts 1-2 hours then he has severe stiffness and slowness. He experiencing freezing of gait, and he can't speak or think at those times, but has not fallen.     His wife describes an episode where he suddenly went OFF and was gripping the walker tightly. She tries to tell him what to do to be safe, but states that he does his own thing. She denies him being impulsive.    He walks with walker most of the time, and he can usually do his own bathing/dressing/toileting/feeding most days, but sometimes needs help. The patient's wife is the primary caregiver, but also works.     After Stage II, post-operatively, he became hypoxic and required monitoring overnight due to bronchospasm. Patient was evaluated by PM&R and therapy for functional deficits and gait/ ADL impairments and recommended acute rehabilitation. Patient was medically optimized for discharge to [facility] on 10/31.    The patient's wife denies a history of constipation.   She states sometimes he gets confused  She denies a history of talking in sleep or acting out dreams.   No dizziness/lightheadedness when standing.   He has slight tremors in the right hand but tremors are not his predominant issue  They tried increasing his Rytary to 4 capsules 4 times a day but he did not do well with this and became more confused.   His wife notes that sometimes she gives him an extra capsule as needed    Current PD meds:  Lexapro 20mg daily  Gabapentin 100mg daily and at bedtime  Neupro 3mg daily  Rytary 145mg 3 capsules at [id number]  Clonazepam 0.5mg BID PRN  Dulxolax, Miralax, Senna    Physical examination:  Patient is awake and alert, participates in examination. Follows simple commands. Patient appears to have some disinhibition  able to answer the name, month, and president correctly  Face is mildly masked. Voice is mild-moderately hypophonic, pressured and dysarthria has improved  No tremors  Mild-moderate rigidity bilaterally  Mild-moderate bradykinesia, left more than right  Gait deferred      DBS programming done 11/6/23  First programming   Left GPi target    Current setting  Contact 2  Setting : 1.5/60/125    Based  on brain sense Contact 1 and contact 2 were choosen    Contact 1: tried as high of stimulation of 4 at which he was able to intiate movement better, one person assist to get up from sitting position, good stride lenght, speech was better  Contact 2, stimulation of 1.5 , able to get from sitting position by himself, initiation of movement better, speech better, bradykinesia improved , walked the entire hallway  higher stim of > 1.5 on contact 2, did not show any improvement in bradykinesia, speech and gait

## 2023-11-09 NOTE — PROGRESS NOTE ADULT - SUBJECTIVE AND OBJECTIVE BOX
SUBJECTIVE/ROS: patient evaluated while in the chair and again in therapy. He states he felt lightheaded and off this morning but was not orthostatic. He also states he did not sleep very well last night. He denies chest pain, fever, chills, nausea, vomiting, abdominal pain, headache, or BLE pain.     HPI:  Patient is a 62yo M with history of severe Parkinson's disease (dx in 2015), HTN, anxiety, who presented to [facility] 10/25 for DBS, now s/p Stage 1 and Stage 2 DBS.He was diagnosed with Parkinson's disease in 2015 when he was having dragging of left leg, and difficulty moving. He was started on levodopa and it worked well for a few years. He now has B/L stiffness, and slowness, but no tremor. He is also experiencing neck discomfort and tilting of his neck forward. He has severe on/off motor fluctuations. He has sudden severe wearing offs of medications, and effects of medication lasts 1-2 hours then he has severe stiffness and slowness. He moves his neck better when he takes the medication. He experiencing freezing of gait, and he can't speak or think at those times. He walks with walker most of the time, and he can usually do his own bathing/dressing/toileting/feeding most days, but sometimes needs help. He is s/p Stage 1 Deep Brain Surgery Bilateral Implantation Into Globus Pallidus Internus With Leksell Frame on 10/25/23 and plan for Stage 2 on 10/30/23. He tolerated procedure well. He was evaluated for admission to acute inpatient rehab and admitted to [facility] on 10/27/23. Subsequently transferred to [facility] for Stage 2 DBS 10/30. Post-operatively, he became hypoxic and required monitoring overnight due to bronchospasm. Patient was evaluated by PM&R and therapy for functional deficits and gait/ ADL impairments and recommended acute rehabilitation. Patient was medically optimized for discharge to [facility] on 10/31.        Vital Signs Last 24 Hrs  T(C): 36.4 (09 Nov 2023 12:40), Max: 36.6 (08 Nov 2023 20:26)  T(F): 97.5 (09 Nov 2023 12:40), Max: 97.9 (08 Nov 2023 20:26)  HR: 82 (09 Nov 2023 12:40) (76 - 82)  BP: 147/82 (09 Nov 2023 12:40) (120/77 - 147/82)  RR: 16 (09 Nov 2023 12:40) (16 - 16)  SpO2: 95% (09 Nov 2023 12:40) (95% - 95%)    O2 Parameters below as of 09 Nov 2023 12:40  Patient On (Oxygen Delivery Method): room air      PHYSICAL EXAM  Constitutional - NAD, Comfortable  HEENT - NCAT, EOMI  Neck - Supple, No limited ROM  Chest - CTA bilaterally  Cardiovascular - RRR, S1S2  Abdomen -BS+, Soft, NTND  Extremities - No C/C/E, No calf tenderness   Neurologic Exam -aox2-3, lopez 5/5, hypophonic, bradykinetic         LABS:                          12.4   10.32 )-----------( 379      ( 09 Nov 2023 07:57 )             40.5     11-09    139  |  105  |  23  ----------------------------<  115<H>  3.7   |  27  |  0.79    Ca    8.6      09 Nov 2023 07:57    TPro  6.2  /  Alb  3.0<L>  /  TBili  0.4  /  DBili  x   /  AST  12  /  ALT  9<L>  /  AlkPhos  79  11-09    LIVER FUNCTIONS - ( 09 Nov 2023 07:57 )  Alb: 3.0 g/dL / Pro: 6.2 g/dL / ALK PHOS: 79 U/L / ALT: 9 U/L / AST: 12 U/L / GGT: x                 MEDICATIONS  (STANDING):  carbidopa 36.25 mG/levodopa 145 mG ER 3 Capsule(s) Oral <User Schedule>  clonazePAM Oral Disintegrating Tablet 0.25 milliGRAM(s) Oral at bedtime  erythromycin   Ointment 1 Application(s) Both EYES four times a day  escitalopram 20 milliGRAM(s) Oral daily  famotidine    Tablet 20 milliGRAM(s) Oral daily  gabapentin 100 milliGRAM(s) Oral daily  gabapentin 100 milliGRAM(s) Oral at bedtime  hydrocortisone 1% Cream 1 Application(s) Topical two times a day  lidocaine   4% Patch 2 Patch Transdermal <User Schedule>  melatonin 3 milliGRAM(s) Oral at bedtime  mirtazapine 7.5 milliGRAM(s) Oral at bedtime  polyethylene glycol 3350 17 Gram(s) Oral two times a day  rivastigmine 1.5 milliGRAM(s) Oral two times a day  rotigotine patch 3 mG/24 Hr(s) 1 Patch Transdermal <User Schedule>  senna 2 Tablet(s) Oral at bedtime    MEDICATIONS  (PRN):  acetaminophen     Tablet .. 650 milliGRAM(s) Oral every 6 hours PRN Mild Pain (1 - 3)  bisacodyl 5 milliGRAM(s) Oral at bedtime PRN Constipation  clonazePAM  Tablet 0.5 milliGRAM(s) Oral two times a day PRN for anxiety

## 2023-11-09 NOTE — PROGRESS NOTE ADULT - ASSESSMENT
Patient is a 62yo M with history of severe Parkinson's disease (dx in 2015), HTN, anxiety, who presented to [facility] 10/25 for DBS, now s/p Stage 1 and Stage 2 DBS. Hospital course complicated by brochospasm requiring overnight observation postoperatively, also now with dysphagia. Admitted to [place] acute inpatient [facility]hab on 10/31 for ADL, gait, and functional impairments.     #Parkinson's Disease now with gait Instability, ADL impairments and Functional impairments  - Continue Comprehensive Rehab Program of PT/OT/SLP    Parkinson's related motor symptoms    #Rigidity/Bradykinesia/wearing off  -S/p Stage 1 Deep Brain Surgery Bilateral Implantation Into Globus Pallidus Internus With Leksell Frame on 10/25/23    -S/p Stage 2 on 10/30/23 - monitored overnight for hypoxia 2/2 bronchospasm  -completed Keflex 500mg BID   -Continue Rytary 145mg 3 tabs at 8a, 12pm, 4pm and 8pm  -Continue Neupro patch 3mg /24hours daily - was every other day now back to daily 11/9  -Movement disorders following - DBS programming today     Parkinson's related non-motor symptoms    #Mood/anxiety  -Continue Lexapro 20mg daily  -Melatonin 3mg at bedtime  -Start clonazepam 0.25mg at bedtime 11/9  -Continue Clonazepam 0.5mg BID PRN  Neuropsychology to follow    #Orthostatic hypotension  -Monitor OH vital signs    #Pain control  - Tylenol PRN  - Lidocaine patches to b/l knees  -Continue gabapentin 100mg BID    #GI/Bowel Management/Constipation  - Continue Senna at bedtime   - Miralax BID    - Bisacodyl PRN  - Pepcid 20mg daily    #Bladder management  - Continue to monitor PVR q 8 hours (SC if > 400)  - Monitor UO, voiding without issues     Concurrent medical problems    #DVT Prophylaxis  - TEDs   - pharmacologic ppx contraindicated given recent surgery    #Skin:  -3 surgical sites on top of skull. Clean dry and intact. Staples present. No fluctuance, erythema, drainage, or oozing at time of exam.  -Do not remove dressings   -Can shower 2 day post-op but do not scrub incision  -Continue hydorcortisone topical to rash BID 11/7    FEN   - Diet - Regular with thins [CCHO, DASH/TLC]    - Dysphagia  SLP - evaluation and treatment appreciated     Precautions / PROPHYLAXIS:   - Falls  - ortho: Weight bearing status: WBAT   - Lungs: Aspiration, Incentive Spirometer   - Pressure injury/Skin: Turn Q2hrs while in bed, OOB to Chair, PT/OT      EDOD  11/14/23

## 2023-11-09 NOTE — PROGRESS NOTE ADULT - ASSESSMENT
Patient is a 64yo M with history of severe Parkinson's disease (dx in 2015), HTN, anxiety, who presented to [facility] 10/25 for DBS, now s/p Stage 1 and Stage 2 DBS. The patient follows with Dr. Gil as an outpatient.     The patient's history is consistent with parkinsonism. He has been participating well in therapy.   Programming of L GPi DBS lead went well with no complications. The patient has shown improvement in overall motor symptoms and cognition appears improved as well.   Patient was unable to tolerate taper of Neupro. Can be considered again as an outpatient. Lower doses of the patch are not on formulary.     Recommendations:  - Continue Rivastigmine 1.5mg BID  - Continue Mirtazepine 7.5mg at bedtime  - Continue Rytary 145mg 3 capsules QID at 4w-[id number]-1o-8p.  - Resume Neupro 3mg daily  - Continue Lexapro 20mg daily  - Continue Gabapentin 100mg BID  - Continue Clonazepam 0.5mg BID PRN anxiety and start 0.25mg at bedtime  Bowel movements titrated to 1 bowel movement daily. Continue Miralax and Senna.   Monitor for OH  Monitor for hallucinations  Continue PT/OT/SLP as part of inpatient PD program    Case was discussed in detail with patient and primary team.  Movement Disorders Neurology will continue to follow this patient.

## 2023-11-10 PROCEDURE — 99232 SBSQ HOSP IP/OBS MODERATE 35: CPT

## 2023-11-10 RX ADMIN — LIDOCAINE 2 PATCH: 4 CREAM TOPICAL at 19:27

## 2023-11-10 RX ADMIN — Medication 0.25 MILLIGRAM(S): at 20:20

## 2023-11-10 RX ADMIN — Medication 1 APPLICATION(S): at 12:11

## 2023-11-10 RX ADMIN — ESCITALOPRAM OXALATE 20 MILLIGRAM(S): 10 TABLET, FILM COATED ORAL at 12:10

## 2023-11-10 RX ADMIN — FAMOTIDINE 20 MILLIGRAM(S): 10 INJECTION INTRAVENOUS at 12:11

## 2023-11-10 RX ADMIN — ROTIGOTINE 1 PATCH: 8 PATCH, EXTENDED RELEASE TRANSDERMAL at 07:12

## 2023-11-10 RX ADMIN — GABAPENTIN 100 MILLIGRAM(S): 400 CAPSULE ORAL at 12:10

## 2023-11-10 RX ADMIN — CARBIDOPA AND LEVODOPA 3 CAPSULE(S): 25; 100 TABLET ORAL at 16:06

## 2023-11-10 RX ADMIN — Medication 1 APPLICATION(S): at 17:51

## 2023-11-10 RX ADMIN — GABAPENTIN 100 MILLIGRAM(S): 400 CAPSULE ORAL at 22:01

## 2023-11-10 RX ADMIN — ROTIGOTINE 1 PATCH: 8 PATCH, EXTENDED RELEASE TRANSDERMAL at 16:07

## 2023-11-10 RX ADMIN — Medication 3 MILLIGRAM(S): at 22:01

## 2023-11-10 RX ADMIN — CARBIDOPA AND LEVODOPA 3 CAPSULE(S): 25; 100 TABLET ORAL at 08:10

## 2023-11-10 RX ADMIN — RIVASTIGMINE 1.5 MILLIGRAM(S): 4.6 PATCH, EXTENDED RELEASE TRANSDERMAL at 17:51

## 2023-11-10 RX ADMIN — LIDOCAINE 2 PATCH: 4 CREAM TOPICAL at 07:27

## 2023-11-10 RX ADMIN — CARBIDOPA AND LEVODOPA 3 CAPSULE(S): 25; 100 TABLET ORAL at 12:10

## 2023-11-10 RX ADMIN — RIVASTIGMINE 1.5 MILLIGRAM(S): 4.6 PATCH, EXTENDED RELEASE TRANSDERMAL at 07:17

## 2023-11-10 RX ADMIN — ROTIGOTINE 1 PATCH: 8 PATCH, EXTENDED RELEASE TRANSDERMAL at 08:07

## 2023-11-10 RX ADMIN — Medication 1 APPLICATION(S): at 07:17

## 2023-11-10 RX ADMIN — CARBIDOPA AND LEVODOPA 3 CAPSULE(S): 25; 100 TABLET ORAL at 20:06

## 2023-11-10 RX ADMIN — ROTIGOTINE 1 PATCH: 8 PATCH, EXTENDED RELEASE TRANSDERMAL at 16:12

## 2023-11-10 RX ADMIN — MIRTAZAPINE 7.5 MILLIGRAM(S): 45 TABLET, ORALLY DISINTEGRATING ORAL at 22:01

## 2023-11-10 NOTE — PROGRESS NOTE ADULT - ASSESSMENT
Patient is a 62yo M with history of severe Parkinson's disease (dx in 2015), HTN, anxiety, who presented to [facility] 10/25 for DBS, now s/p Stage 1 and Stage 2 DBS. Hospital course complicated by brochospasm requiring overnight observation postoperatively, also now with dysphagia. Admitted to [place] acute inpatient rehab on 10/31 for ADL, gait, and functional impairments.     #Parkinson's Disease now with gait Instability, ADL impairments and Functional impairments  - Continue Comprehensive Rehab Program of PT/OT/SLP    Parkinson's related motor symptoms    #Rigidity/Bradykinesia/wearing off  -S/p Stage 1 Deep Brain Surgery Bilateral Implantation Into Globus Pallidus Internus With Leksell Frame on 10/25/23    -S/p Stage 2 on 10/30/23 - monitored overnight for hypoxia 2/2 bronchospasm  -completed Keflex 500mg BID   -Continue Rytary 145mg 3 tabs at 8a, 12pm, 4pm and 8pm  -Continue Neupro patch 3mg /24hours daily - was every other day now back to daily 11/9  -Movement disorders following - DBS programming today     Parkinson's related non-motor symptoms    #Mood/anxiety  -Continue Lexapro 20mg daily  -Melatonin 3mg at bedtime  -Continue clonazepam 0.25mg at bedtime 11/9  -Continue Clonazepam 0.5mg BID PRN  Neuropsychology to follow    #Orthostatic hypotension  -Monitor OH vital signs    #Pain control  - Tylenol PRN  - Lidocaine patches to b/l knees  -Continue gabapentin 100mg BID    #GI/Bowel Management/Constipation  - Continue Senna at bedtime   - Miralax BID    - Bisacodyl PRN  - Pepcid 20mg daily    #Bladder management  - Continue to monitor PVR q 8 hours (SC if > 400)  - Monitor UO, voiding without issues     Concurrent medical problems    #DVT Prophylaxis  - TEDs   - pharmacologic ppx contraindicated given recent surgery    #Skin:  -3 surgical sites on top of skull. Clean dry and intact. Staples present. No fluctuance, erythema, drainage, or oozing at time of exam.  -Do not remove dressings   -Can shower 2 day post-op but do not scrub incision  -Continue hydorcortisone topical to rash BID 11/7    FEN   - Diet - Regular with thins [CCHO, DASH/TLC]    - Dysphagia  SLP - evaluation and treatment appreciated     Precautions / PROPHYLAXIS:   - Falls  - ortho: Weight bearing status: WBAT   - Lungs: Aspiration, Incentive Spirometer   - Pressure injury/Skin: Turn Q2hrs while in bed, OOB to Chair, PT/OT      EDOD  11/14/23

## 2023-11-10 NOTE — PROGRESS NOTE ADULT - SUBJECTIVE AND OBJECTIVE BOX
SUBJECTIVE/ROS: patient evaluated while in the chair and again in therapy. He states he feels much better today and is seen participating better in therapy. He slept well overnight. He denies chest pain, fever, chills, nausea, vomiting, abdominal pain, headache, or BLE pain.     HPI:  Patient is a 62yo M with history of severe Parkinson's disease (dx in 2015), HTN, anxiety, who presented to [facility] 10/25 for DBS, now s/p Stage 1 and Stage 2 DBS.He was diagnosed with Parkinson's disease in 2015 when he was having dragging of left leg, and difficulty moving. He was started on levodopa and it worked well for a few years. He now has B/L stiffness, and slowness, but no tremor. He is also experiencing neck discomfort and tilting of his neck forward. He has severe on/off motor fluctuations. He has sudden severe wearing offs of medications, and effects of medication lasts 1-2 hours then he has severe stiffness and slowness. He moves his neck better when he takes the medication. He experiencing freezing of gait, and he can't speak or think at those times. He walks with walker most of the time, and he can usually do his own bathing/dressing/toileting/feeding most days, but sometimes needs help. He is s/p Stage 1 Deep Brain Surgery Bilateral Implantation Into Globus Pallidus Internus With Leksell Frame on 10/25/23 and plan for Stage 2 on 10/30/23. He tolerated procedure well. He was evaluated for admission to acute inpatient rehab and admitted to [facility] on 10/27/23. Subsequently transferred to [facility] for Stage 2 DBS 10/30. Post-operatively, he became hypoxic and required monitoring overnight due to bronchospasm. Patient was evaluated by PM&R and therapy for functional deficits and gait/ ADL impairments and recommended acute rehabilitation. Patient was medically optimized for discharge to [facility] on 10/31.        Vital Signs Last 24 Hrs  T(C): 36.7 (09 Nov 2023 20:21), Max: 36.7 (09 Nov 2023 20:21)  T(F): 98 (09 Nov 2023 20:21), Max: 98 (09 Nov 2023 20:21)  HR: 78 (09 Nov 2023 20:21) (78 - 87)  BP: 122/73 (09 Nov 2023 20:21) (116/72 - 147/82)  BP(mean): --  RR: 16 (09 Nov 2023 20:21) (16 - 16)  SpO2: 96% (09 Nov 2023 20:21) (95% - 96%)    Parameters below as of 09 Nov 2023 20:21  Patient On (Oxygen Delivery Method): room air          PHYSICAL EXAM  Constitutional - NAD, Comfortable  HEENT - NCAT, EOMI  Neck - Supple, No limited ROM  Chest - CTA bilaterally  Cardiovascular - RRR, S1S2  Abdomen -BS+, Soft, NTND  Extremities - No C/C/E, No calf tenderness   Neurologic Exam -aox2-3, lopez 5/5, hypophonic, bradykinetic         LABS:                          12.4   10.32 )-----------( 379      ( 09 Nov 2023 07:57 )             40.5     11-09    139  |  105  |  23  ----------------------------<  115<H>  3.7   |  27  |  0.79    Ca    8.6      09 Nov 2023 07:57    TPro  6.2  /  Alb  3.0<L>  /  TBili  0.4  /  DBili  x   /  AST  12  /  ALT  9<L>  /  AlkPhos  79  11-09    LIVER FUNCTIONS - ( 09 Nov 2023 07:57 )  Alb: 3.0 g/dL / Pro: 6.2 g/dL / ALK PHOS: 79 U/L / ALT: 9 U/L / AST: 12 U/L / GGT: x                 MEDICATIONS  (STANDING):  carbidopa 36.25 mG/levodopa 145 mG ER 3 Capsule(s) Oral <User Schedule>  clonazePAM Oral Disintegrating Tablet 0.25 milliGRAM(s) Oral at bedtime  erythromycin   Ointment 1 Application(s) Both EYES four times a day  escitalopram 20 milliGRAM(s) Oral daily  famotidine    Tablet 20 milliGRAM(s) Oral daily  gabapentin 100 milliGRAM(s) Oral daily  gabapentin 100 milliGRAM(s) Oral at bedtime  hydrocortisone 1% Cream 1 Application(s) Topical two times a day  lidocaine   4% Patch 2 Patch Transdermal <User Schedule>  melatonin 3 milliGRAM(s) Oral at bedtime  mirtazapine 7.5 milliGRAM(s) Oral at bedtime  polyethylene glycol 3350 17 Gram(s) Oral two times a day  rivastigmine 1.5 milliGRAM(s) Oral two times a day  rotigotine patch 3 mG/24 Hr(s) 1 Patch Transdermal <User Schedule>  senna 2 Tablet(s) Oral at bedtime    MEDICATIONS  (PRN):  acetaminophen     Tablet .. 650 milliGRAM(s) Oral every 6 hours PRN Mild Pain (1 - 3)  bisacodyl 5 milliGRAM(s) Oral at bedtime PRN Constipation  clonazePAM  Tablet 0.5 milliGRAM(s) Oral two times a day PRN for anxiety

## 2023-11-11 PROCEDURE — 99232 SBSQ HOSP IP/OBS MODERATE 35: CPT

## 2023-11-11 RX ADMIN — GABAPENTIN 100 MILLIGRAM(S): 400 CAPSULE ORAL at 12:00

## 2023-11-11 RX ADMIN — Medication 1 APPLICATION(S): at 07:08

## 2023-11-11 RX ADMIN — Medication 1 APPLICATION(S): at 11:59

## 2023-11-11 RX ADMIN — RIVASTIGMINE 1.5 MILLIGRAM(S): 4.6 PATCH, EXTENDED RELEASE TRANSDERMAL at 17:41

## 2023-11-11 RX ADMIN — ROTIGOTINE 1 PATCH: 8 PATCH, EXTENDED RELEASE TRANSDERMAL at 16:05

## 2023-11-11 RX ADMIN — CARBIDOPA AND LEVODOPA 3 CAPSULE(S): 25; 100 TABLET ORAL at 16:01

## 2023-11-11 RX ADMIN — Medication 1 APPLICATION(S): at 07:07

## 2023-11-11 RX ADMIN — ROTIGOTINE 1 PATCH: 8 PATCH, EXTENDED RELEASE TRANSDERMAL at 19:01

## 2023-11-11 RX ADMIN — Medication 0.25 MILLIGRAM(S): at 20:01

## 2023-11-11 RX ADMIN — Medication 1 APPLICATION(S): at 17:41

## 2023-11-11 RX ADMIN — CARBIDOPA AND LEVODOPA 3 CAPSULE(S): 25; 100 TABLET ORAL at 20:00

## 2023-11-11 RX ADMIN — CARBIDOPA AND LEVODOPA 3 CAPSULE(S): 25; 100 TABLET ORAL at 11:59

## 2023-11-11 RX ADMIN — ROTIGOTINE 1 PATCH: 8 PATCH, EXTENDED RELEASE TRANSDERMAL at 07:07

## 2023-11-11 RX ADMIN — CARBIDOPA AND LEVODOPA 3 CAPSULE(S): 25; 100 TABLET ORAL at 07:52

## 2023-11-11 RX ADMIN — RIVASTIGMINE 1.5 MILLIGRAM(S): 4.6 PATCH, EXTENDED RELEASE TRANSDERMAL at 06:53

## 2023-11-11 RX ADMIN — ESCITALOPRAM OXALATE 20 MILLIGRAM(S): 10 TABLET, FILM COATED ORAL at 11:59

## 2023-11-11 RX ADMIN — MIRTAZAPINE 7.5 MILLIGRAM(S): 45 TABLET, ORALLY DISINTEGRATING ORAL at 20:01

## 2023-11-11 RX ADMIN — FAMOTIDINE 20 MILLIGRAM(S): 10 INJECTION INTRAVENOUS at 11:59

## 2023-11-11 RX ADMIN — GABAPENTIN 100 MILLIGRAM(S): 400 CAPSULE ORAL at 20:01

## 2023-11-11 RX ADMIN — ROTIGOTINE 1 PATCH: 8 PATCH, EXTENDED RELEASE TRANSDERMAL at 16:01

## 2023-11-11 RX ADMIN — Medication 3 MILLIGRAM(S): at 20:01

## 2023-11-11 NOTE — PROGRESS NOTE ADULT - ASSESSMENT
62yo M with history of severe Parkinson's disease (dx in 2015), HTN, anxiety, who presented to [facility] 10/25 for DBS, now s/p Stage 1 and Stage 2 DBS. Hospital course complicated by bronchospasm requiring overnight observation postoperatively, also now with dysphagia. Admitted to [place] acute inpatient rehab on 10/31 for ADL, gait, and functional impairments- pt/ot/dvt ppx    #Rigidity/Bradykinesia/wearing off  -S/p Stage 1 Deep Brain Surgery Bilateral Implantation Into Globus Pallidus Internus With Leksell Frame on 10/25/23    -S/p Stage 2 on 10/30/23 - monitored overnight for hypoxia 2/2 bronchospasms  -Continue Rytary   -Continue Neupro patch     #Mood/anxiety  -Continue Lexapro   -Continue Clonazepam     #Orthostatic hypotension  -Monitor OH vital signs    #Pain control  - Tylenol PRN  - Lidocaine patches to b/l knees  -Continue gabapentin    #GI/Bowel Management/Constipation  - Continue Senna at bedtime   - Miralax BID  - Bisacodyl PRN  - Pepcid       #DVT Prophylaxis  - TEDs   - no pharmacologic ppx given recent surgery

## 2023-11-11 NOTE — PROGRESS NOTE ADULT - SUBJECTIVE AND OBJECTIVE BOX
Cc: Gait dysfunction    HPI: Patient with no new medical issues today. States he slept well, is eating breakfast.   Pain controlled, no chest pain, no N/V, no Fevers/Chills. No other new ROS  Has been tolerating rehabilitation program.    acetaminophen     Tablet .. 650 milliGRAM(s) Oral every 6 hours PRN  bisacodyl 5 milliGRAM(s) Oral at bedtime PRN  carbidopa 36.25 mG/levodopa 145 mG ER 3 Capsule(s) Oral <User Schedule>  clonazePAM  Tablet 0.5 milliGRAM(s) Oral two times a day PRN  clonazePAM Oral Disintegrating Tablet 0.25 milliGRAM(s) Oral at bedtime  erythromycin   Ointment 1 Application(s) Both EYES four times a day  escitalopram 20 milliGRAM(s) Oral daily  famotidine    Tablet 20 milliGRAM(s) Oral daily  gabapentin 100 milliGRAM(s) Oral daily  gabapentin 100 milliGRAM(s) Oral at bedtime  hydrocortisone 1% Cream 1 Application(s) Topical two times a day  lidocaine   4% Patch 2 Patch Transdermal <User Schedule>  melatonin 3 milliGRAM(s) Oral at bedtime  mirtazapine 7.5 milliGRAM(s) Oral at bedtime  polyethylene glycol 3350 17 Gram(s) Oral two times a day  rivastigmine 1.5 milliGRAM(s) Oral two times a day  rotigotine patch 3 mG/24 Hr(s) 1 Patch Transdermal <User Schedule>  senna 2 Tablet(s) Oral at bedtime      T(C): 36.7 (11-11-23 @ 08:55), Max: 37.3 (11-10-23 @ 20:11)  HR: 72 (11-11-23 @ 08:55) (72 - 93)  BP: 144/84 (11-11-23 @ 08:55) (138/71 - 144/84)  RR: 16 (11-11-23 @ 08:55) (16 - 16)  SpO2: 96% (11-11-23 @ 08:55) (96% - 96%)    In NAD  HEENT- EOMI, +L skull site with staples in place, skin well approximated, no surrounding erythema, swelling or discharge.   Heart- RRR, S1S2  Lungs- CTA bl.  Abd- + BS, NT  Ext- No calf pain  Neuro- Exam unchanged          Imp: Patient with diagnosis of     Parkinson's Disease s/p DBS     admitted for comprehensive acute rehabilitation.    Plan:  - Continue PT/OT/SLP  - DVT prophylaxis - TEDs and SCDS, no pharmacologic ppx in setting of recent DBS surgery.   - Skin- Turn q2h, check skin daily, monitor surgical incision sites.  - Continue current medications; patient medically stable.   - Patient is stable to continue current rehabilitation program.

## 2023-11-11 NOTE — PROGRESS NOTE ADULT - SUBJECTIVE AND OBJECTIVE BOX
no acute events overnight  wife by bedside      PHYSICAL EXAM:    Vital Signs Last 24 Hrs  T(F): 98 (11 Nov 2023 08:55), Max: 99.1 (10 Nov 2023 20:11)  HR: 72 (11 Nov 2023 08:55) (72 - 93)  BP: 144/84 (11 Nov 2023 08:55) (138/71 - 144/84)  RR: 16 (11 Nov 2023 08:55) (16 - 16)  SpO2: 96% (11 Nov 2023 08:55) (96% - 96%)  I&O's Summary      GENERAL: NAD  HEENT: NCAT  CHEST/LUNG: No increased WOB, Clear to percussion bilaterally; No rales, rhonchi, wheezing  HEART: Regular rate and rhythm; No murmurs  ABDOMEN: Soft, Nontender, Nondistended; Bowel sounds present  MUSCULOSKELETAL/EXTREMITIES:  2+ Peripheral Pulses, No LE edema  PSYCH: Appropriate affect, Alert & Oriented    LABS:                        12.4   10.32 )-----------( 379      ( 09 Nov 2023 07:57 )             40.5       11-09    139  |  105  |  23  ----------------------------<  115  3.7   |  27  |  0.79    Ca    8.6      09 Nov 2023 07:57    TPro  6.2  /  Alb  3.0  /  TBili  0.4  /  DBili  x   /  AST  12  /  ALT  9   /  AlkPhos  79  11-09                                  Urinalysis Basic - ( 09 Nov 2023 07:57 )    Color: x / Appearance: x / SG: x / pH: x  Gluc: 115 mg/dL / Ketone: x  / Bili: x / Urobili: x   Blood: x / Protein: x / Nitrite: x   Leuk Esterase: x / RBC: x / WBC x   Sq Epi: x / Non Sq Epi: x / Bacteria: x        COVID-19 PCR: NotDetec (10-27-23 @ 19:57)      MEDICATIONS  (STANDING):  carbidopa 36.25 mG/levodopa 145 mG ER 3 Capsule(s) Oral <User Schedule>  clonazePAM Oral Disintegrating Tablet 0.25 milliGRAM(s) Oral at bedtime  erythromycin   Ointment 1 Application(s) Both EYES four times a day  escitalopram 20 milliGRAM(s) Oral daily  famotidine    Tablet 20 milliGRAM(s) Oral daily  gabapentin 100 milliGRAM(s) Oral daily  gabapentin 100 milliGRAM(s) Oral at bedtime  hydrocortisone 1% Cream 1 Application(s) Topical two times a day  lidocaine   4% Patch 2 Patch Transdermal <User Schedule>  melatonin 3 milliGRAM(s) Oral at bedtime  mirtazapine 7.5 milliGRAM(s) Oral at bedtime  polyethylene glycol 3350 17 Gram(s) Oral two times a day  rivastigmine 1.5 milliGRAM(s) Oral two times a day  rotigotine patch 3 mG/24 Hr(s) 1 Patch Transdermal <User Schedule>  senna 2 Tablet(s) Oral at bedtime    MEDICATIONS  (PRN):  acetaminophen     Tablet .. 650 milliGRAM(s) Oral every 6 hours PRN Mild Pain (1 - 3)  bisacodyl 5 milliGRAM(s) Oral at bedtime PRN Constipation  clonazePAM  Tablet 0.5 milliGRAM(s) Oral two times a day PRN for anxiety      Care Discussed with Consultants/Other Providers: Yes

## 2023-11-12 PROCEDURE — 99232 SBSQ HOSP IP/OBS MODERATE 35: CPT

## 2023-11-12 RX ADMIN — Medication 1 APPLICATION(S): at 17:43

## 2023-11-12 RX ADMIN — CARBIDOPA AND LEVODOPA 3 CAPSULE(S): 25; 100 TABLET ORAL at 20:00

## 2023-11-12 RX ADMIN — Medication 1 APPLICATION(S): at 12:32

## 2023-11-12 RX ADMIN — CARBIDOPA AND LEVODOPA 3 CAPSULE(S): 25; 100 TABLET ORAL at 07:47

## 2023-11-12 RX ADMIN — CARBIDOPA AND LEVODOPA 3 CAPSULE(S): 25; 100 TABLET ORAL at 16:05

## 2023-11-12 RX ADMIN — Medication 1 APPLICATION(S): at 07:47

## 2023-11-12 RX ADMIN — GABAPENTIN 100 MILLIGRAM(S): 400 CAPSULE ORAL at 20:01

## 2023-11-12 RX ADMIN — Medication 0.25 MILLIGRAM(S): at 20:01

## 2023-11-12 RX ADMIN — ROTIGOTINE 1 PATCH: 8 PATCH, EXTENDED RELEASE TRANSDERMAL at 18:33

## 2023-11-12 RX ADMIN — RIVASTIGMINE 1.5 MILLIGRAM(S): 4.6 PATCH, EXTENDED RELEASE TRANSDERMAL at 07:02

## 2023-11-12 RX ADMIN — MIRTAZAPINE 7.5 MILLIGRAM(S): 45 TABLET, ORALLY DISINTEGRATING ORAL at 20:01

## 2023-11-12 RX ADMIN — ROTIGOTINE 1 PATCH: 8 PATCH, EXTENDED RELEASE TRANSDERMAL at 16:04

## 2023-11-12 RX ADMIN — ESCITALOPRAM OXALATE 20 MILLIGRAM(S): 10 TABLET, FILM COATED ORAL at 12:32

## 2023-11-12 RX ADMIN — LIDOCAINE 2 PATCH: 4 CREAM TOPICAL at 18:33

## 2023-11-12 RX ADMIN — ROTIGOTINE 1 PATCH: 8 PATCH, EXTENDED RELEASE TRANSDERMAL at 16:02

## 2023-11-12 RX ADMIN — Medication 3 MILLIGRAM(S): at 20:01

## 2023-11-12 RX ADMIN — FAMOTIDINE 20 MILLIGRAM(S): 10 INJECTION INTRAVENOUS at 12:31

## 2023-11-12 RX ADMIN — LIDOCAINE 2 PATCH: 4 CREAM TOPICAL at 17:47

## 2023-11-12 RX ADMIN — CARBIDOPA AND LEVODOPA 3 CAPSULE(S): 25; 100 TABLET ORAL at 12:32

## 2023-11-12 RX ADMIN — GABAPENTIN 100 MILLIGRAM(S): 400 CAPSULE ORAL at 12:32

## 2023-11-12 RX ADMIN — ROTIGOTINE 1 PATCH: 8 PATCH, EXTENDED RELEASE TRANSDERMAL at 08:49

## 2023-11-12 RX ADMIN — RIVASTIGMINE 1.5 MILLIGRAM(S): 4.6 PATCH, EXTENDED RELEASE TRANSDERMAL at 17:43

## 2023-11-12 NOTE — PROGRESS NOTE ADULT - SUBJECTIVE AND OBJECTIVE BOX
Cc: Gait dysfunction    HPI: Patient with no new medical issues today. States he slept well, wife is at bedside. States he had a good SLP session yesterday. Is motivated to continue his therapies. No other complaints.   Pain controlled, no chest pain, no N/V, no Fevers/Chills. No other new ROS  Has been tolerating rehabilitation program.    acetaminophen     Tablet .. 650 milliGRAM(s) Oral every 6 hours PRN  bisacodyl 5 milliGRAM(s) Oral at bedtime PRN  carbidopa 36.25 mG/levodopa 145 mG ER 3 Capsule(s) Oral <User Schedule>  clonazePAM  Tablet 0.5 milliGRAM(s) Oral two times a day PRN  clonazePAM Oral Disintegrating Tablet 0.25 milliGRAM(s) Oral at bedtime  erythromycin   Ointment 1 Application(s) Both EYES four times a day  escitalopram 20 milliGRAM(s) Oral daily  famotidine    Tablet 20 milliGRAM(s) Oral daily  gabapentin 100 milliGRAM(s) Oral daily  gabapentin 100 milliGRAM(s) Oral at bedtime  hydrocortisone 1% Cream 1 Application(s) Topical two times a day  lidocaine   4% Patch 2 Patch Transdermal <User Schedule>  melatonin 3 milliGRAM(s) Oral at bedtime  mirtazapine 7.5 milliGRAM(s) Oral at bedtime  polyethylene glycol 3350 17 Gram(s) Oral two times a day  rivastigmine 1.5 milliGRAM(s) Oral two times a day  rotigotine patch 3 mG/24 Hr(s) 1 Patch Transdermal <User Schedule>  senna 2 Tablet(s) Oral at bedtime      Vital Signs Last 24 Hrs  T(C): 37.2 (12 Nov 2023 08:05), Max: 37.2 (12 Nov 2023 08:05)  T(F): 98.9 (12 Nov 2023 08:05), Max: 98.9 (12 Nov 2023 08:05)  HR: 67 (12 Nov 2023 08:05) (67 - 69)  BP: 136/80 (12 Nov 2023 08:05) (120/72 - 136/80)  BP(mean): --  RR: 16 (12 Nov 2023 08:05) (16 - 16)  SpO2: 95% (12 Nov 2023 08:05) (94% - 95%)    Parameters below as of 12 Nov 2023 08:05  Patient On (Oxygen Delivery Method): room air        In NAD  HEENT- EOMI, +L skull site with staples in place, skin well approximated, no surrounding erythema, swelling or discharge.   Heart- RRR, S1S2  Lungs- CTA bl.  Abd- + BS, NT  Ext- No calf pain  Neuro- Exam unchanged          Imp: Patient with diagnosis of     Parkinson's Disease s/p DBS     admitted for comprehensive acute rehabilitation.    Plan:  - Continue PT/OT/SLP  - DVT prophylaxis - TEDs and SCDS, no pharmacologic ppx in setting of recent DBS surgery.   - Skin- Turn q2h, check skin daily, monitor surgical incision sites.  - Continue current medications; patient medically stable.   - Patient is stable to continue current rehabilitation program.

## 2023-11-13 ENCOUNTER — TRANSCRIPTION ENCOUNTER (OUTPATIENT)
Age: 63
End: 2023-11-13

## 2023-11-13 LAB
ALBUMIN SERPL ELPH-MCNC: 2.8 G/DL — LOW (ref 3.3–5)
ALBUMIN SERPL ELPH-MCNC: 2.8 G/DL — LOW (ref 3.3–5)
ALP SERPL-CCNC: 73 U/L — SIGNIFICANT CHANGE UP (ref 40–120)
ALP SERPL-CCNC: 73 U/L — SIGNIFICANT CHANGE UP (ref 40–120)
ALT FLD-CCNC: 8 U/L — LOW (ref 10–45)
ALT FLD-CCNC: 8 U/L — LOW (ref 10–45)
ANION GAP SERPL CALC-SCNC: 9 MMOL/L — SIGNIFICANT CHANGE UP (ref 5–17)
ANION GAP SERPL CALC-SCNC: 9 MMOL/L — SIGNIFICANT CHANGE UP (ref 5–17)
AST SERPL-CCNC: 17 U/L — SIGNIFICANT CHANGE UP (ref 10–40)
AST SERPL-CCNC: 17 U/L — SIGNIFICANT CHANGE UP (ref 10–40)
BILIRUB SERPL-MCNC: 0.3 MG/DL — SIGNIFICANT CHANGE UP (ref 0.2–1.2)
BILIRUB SERPL-MCNC: 0.3 MG/DL — SIGNIFICANT CHANGE UP (ref 0.2–1.2)
BUN SERPL-MCNC: 14 MG/DL — SIGNIFICANT CHANGE UP (ref 7–23)
BUN SERPL-MCNC: 14 MG/DL — SIGNIFICANT CHANGE UP (ref 7–23)
CALCIUM SERPL-MCNC: 8.3 MG/DL — LOW (ref 8.4–10.5)
CALCIUM SERPL-MCNC: 8.3 MG/DL — LOW (ref 8.4–10.5)
CHLORIDE SERPL-SCNC: 104 MMOL/L — SIGNIFICANT CHANGE UP (ref 96–108)
CHLORIDE SERPL-SCNC: 104 MMOL/L — SIGNIFICANT CHANGE UP (ref 96–108)
CO2 SERPL-SCNC: 26 MMOL/L — SIGNIFICANT CHANGE UP (ref 22–31)
CO2 SERPL-SCNC: 26 MMOL/L — SIGNIFICANT CHANGE UP (ref 22–31)
CREAT SERPL-MCNC: 0.59 MG/DL — SIGNIFICANT CHANGE UP (ref 0.5–1.3)
CREAT SERPL-MCNC: 0.59 MG/DL — SIGNIFICANT CHANGE UP (ref 0.5–1.3)
EGFR: 110 ML/MIN/1.73M2 — SIGNIFICANT CHANGE UP
EGFR: 110 ML/MIN/1.73M2 — SIGNIFICANT CHANGE UP
GLUCOSE SERPL-MCNC: 112 MG/DL — HIGH (ref 70–99)
GLUCOSE SERPL-MCNC: 112 MG/DL — HIGH (ref 70–99)
HCT VFR BLD CALC: 39.6 % — SIGNIFICANT CHANGE UP (ref 39–50)
HCT VFR BLD CALC: 39.6 % — SIGNIFICANT CHANGE UP (ref 39–50)
HGB BLD-MCNC: 12.5 G/DL — LOW (ref 13–17)
HGB BLD-MCNC: 12.5 G/DL — LOW (ref 13–17)
MCHC RBC-ENTMCNC: 29.7 PG — SIGNIFICANT CHANGE UP (ref 27–34)
MCHC RBC-ENTMCNC: 29.7 PG — SIGNIFICANT CHANGE UP (ref 27–34)
MCHC RBC-ENTMCNC: 31.6 GM/DL — LOW (ref 32–36)
MCHC RBC-ENTMCNC: 31.6 GM/DL — LOW (ref 32–36)
MCV RBC AUTO: 94.1 FL — SIGNIFICANT CHANGE UP (ref 80–100)
MCV RBC AUTO: 94.1 FL — SIGNIFICANT CHANGE UP (ref 80–100)
NRBC # BLD: 0 /100 WBCS — SIGNIFICANT CHANGE UP (ref 0–0)
NRBC # BLD: 0 /100 WBCS — SIGNIFICANT CHANGE UP (ref 0–0)
PLATELET # BLD AUTO: 247 K/UL — SIGNIFICANT CHANGE UP (ref 150–400)
PLATELET # BLD AUTO: 247 K/UL — SIGNIFICANT CHANGE UP (ref 150–400)
POTASSIUM SERPL-MCNC: 3.3 MMOL/L — LOW (ref 3.5–5.3)
POTASSIUM SERPL-MCNC: 3.3 MMOL/L — LOW (ref 3.5–5.3)
POTASSIUM SERPL-SCNC: 3.3 MMOL/L — LOW (ref 3.5–5.3)
POTASSIUM SERPL-SCNC: 3.3 MMOL/L — LOW (ref 3.5–5.3)
PROT SERPL-MCNC: 5.8 G/DL — LOW (ref 6–8.3)
PROT SERPL-MCNC: 5.8 G/DL — LOW (ref 6–8.3)
RBC # BLD: 4.21 M/UL — SIGNIFICANT CHANGE UP (ref 4.2–5.8)
RBC # BLD: 4.21 M/UL — SIGNIFICANT CHANGE UP (ref 4.2–5.8)
RBC # FLD: 13.6 % — SIGNIFICANT CHANGE UP (ref 10.3–14.5)
RBC # FLD: 13.6 % — SIGNIFICANT CHANGE UP (ref 10.3–14.5)
SODIUM SERPL-SCNC: 139 MMOL/L — SIGNIFICANT CHANGE UP (ref 135–145)
SODIUM SERPL-SCNC: 139 MMOL/L — SIGNIFICANT CHANGE UP (ref 135–145)
WBC # BLD: 4.31 K/UL — SIGNIFICANT CHANGE UP (ref 3.8–10.5)
WBC # BLD: 4.31 K/UL — SIGNIFICANT CHANGE UP (ref 3.8–10.5)
WBC # FLD AUTO: 4.31 K/UL — SIGNIFICANT CHANGE UP (ref 3.8–10.5)
WBC # FLD AUTO: 4.31 K/UL — SIGNIFICANT CHANGE UP (ref 3.8–10.5)

## 2023-11-13 PROCEDURE — 70450 CT HEAD/BRAIN W/O DYE: CPT

## 2023-11-13 PROCEDURE — 76000 FLUOROSCOPY <1 HR PHYS/QHP: CPT

## 2023-11-13 PROCEDURE — C1889: CPT

## 2023-11-13 PROCEDURE — 97161 PT EVAL LOW COMPLEX 20 MIN: CPT

## 2023-11-13 PROCEDURE — 70460 CT HEAD/BRAIN W/DYE: CPT

## 2023-11-13 PROCEDURE — 97166 OT EVAL MOD COMPLEX 45 MIN: CPT

## 2023-11-13 PROCEDURE — 70260 X-RAY EXAM OF SKULL: CPT

## 2023-11-13 PROCEDURE — 99232 SBSQ HOSP IP/OBS MODERATE 35: CPT

## 2023-11-13 PROCEDURE — 85027 COMPLETE CBC AUTOMATED: CPT

## 2023-11-13 PROCEDURE — 80048 BASIC METABOLIC PNL TOTAL CA: CPT

## 2023-11-13 PROCEDURE — C1778: CPT

## 2023-11-13 RX ORDER — ESCITALOPRAM OXALATE 10 MG/1
1 TABLET, FILM COATED ORAL
Qty: 30 | Refills: 0
Start: 2023-11-13 | End: 2023-12-12

## 2023-11-13 RX ORDER — GABAPENTIN 400 MG/1
2 CAPSULE ORAL
Qty: 0 | Refills: 0 | DISCHARGE

## 2023-11-13 RX ORDER — FAMOTIDINE 10 MG/ML
1 INJECTION INTRAVENOUS
Qty: 0 | Refills: 0 | DISCHARGE

## 2023-11-13 RX ORDER — LIDOCAINE 4 G/100G
1 CREAM TOPICAL
Qty: 0 | Refills: 0 | DISCHARGE
Start: 2023-11-13

## 2023-11-13 RX ORDER — ERYTHROMYCIN BASE 5 MG/GRAM
1 OINTMENT (GRAM) OPHTHALMIC (EYE)
Qty: 0 | Refills: 0 | DISCHARGE
Start: 2023-11-13

## 2023-11-13 RX ORDER — SENNA PLUS 8.6 MG/1
2 TABLET ORAL
Qty: 0 | Refills: 0 | DISCHARGE
Start: 2023-11-13

## 2023-11-13 RX ORDER — ROTIGOTINE 8 MG/24H
1 PATCH, EXTENDED RELEASE TRANSDERMAL
Qty: 30 | Refills: 0
Start: 2023-11-13 | End: 2023-12-12

## 2023-11-13 RX ORDER — POTASSIUM CHLORIDE 20 MEQ
40 PACKET (EA) ORAL ONCE
Refills: 0 | Status: COMPLETED | OUTPATIENT
Start: 2023-11-13 | End: 2023-11-13

## 2023-11-13 RX ORDER — CEPHALEXIN 500 MG
1 CAPSULE ORAL
Qty: 0 | Refills: 0 | DISCHARGE

## 2023-11-13 RX ORDER — FAMOTIDINE 10 MG/ML
1 INJECTION INTRAVENOUS
Qty: 0 | Refills: 0 | DISCHARGE
Start: 2023-11-13

## 2023-11-13 RX ORDER — HYDROCORTISONE 1 %
1 OINTMENT (GRAM) TOPICAL
Qty: 0 | Refills: 0 | DISCHARGE
Start: 2023-11-13

## 2023-11-13 RX ORDER — POLYETHYLENE GLYCOL 3350 17 G/17G
17 POWDER, FOR SOLUTION ORAL
Qty: 0 | Refills: 0 | DISCHARGE
Start: 2023-11-13

## 2023-11-13 RX ORDER — RIVASTIGMINE 4.6 MG/24H
1 PATCH, EXTENDED RELEASE TRANSDERMAL
Qty: 60 | Refills: 0
Start: 2023-11-13 | End: 2023-12-12

## 2023-11-13 RX ORDER — CLONAZEPAM 1 MG
0.5 TABLET ORAL
Qty: 30 | Refills: 0
Start: 2023-11-13 | End: 2023-12-12

## 2023-11-13 RX ORDER — ESCITALOPRAM OXALATE 10 MG/1
1 TABLET, FILM COATED ORAL
Qty: 0 | Refills: 0 | DISCHARGE

## 2023-11-13 RX ORDER — LANOLIN ALCOHOL/MO/W.PET/CERES
1 CREAM (GRAM) TOPICAL
Qty: 0 | Refills: 0 | DISCHARGE
Start: 2023-11-13

## 2023-11-13 RX ORDER — CARBIDOPA AND LEVODOPA 25; 100 MG/1; MG/1
3 TABLET ORAL
Qty: 360 | Refills: 0
Start: 2023-11-13 | End: 2023-12-12

## 2023-11-13 RX ORDER — ACETAMINOPHEN 500 MG
2 TABLET ORAL
Qty: 0 | Refills: 0 | DISCHARGE
Start: 2023-11-13

## 2023-11-13 RX ORDER — GABAPENTIN 400 MG/1
1 CAPSULE ORAL
Qty: 60 | Refills: 0
Start: 2023-11-13 | End: 2023-12-12

## 2023-11-13 RX ORDER — CLONAZEPAM 1 MG
1 TABLET ORAL
Qty: 0 | Refills: 0 | DISCHARGE

## 2023-11-13 RX ORDER — MIRTAZAPINE 45 MG/1
1 TABLET, ORALLY DISINTEGRATING ORAL
Qty: 30 | Refills: 0
Start: 2023-11-13 | End: 2023-12-12

## 2023-11-13 RX ORDER — GABAPENTIN 400 MG/1
0 CAPSULE ORAL
Qty: 0 | Refills: 0 | DISCHARGE

## 2023-11-13 RX ADMIN — ROTIGOTINE 1 PATCH: 8 PATCH, EXTENDED RELEASE TRANSDERMAL at 17:05

## 2023-11-13 RX ADMIN — ESCITALOPRAM OXALATE 20 MILLIGRAM(S): 10 TABLET, FILM COATED ORAL at 12:02

## 2023-11-13 RX ADMIN — LIDOCAINE 2 PATCH: 4 CREAM TOPICAL at 18:52

## 2023-11-13 RX ADMIN — GABAPENTIN 100 MILLIGRAM(S): 400 CAPSULE ORAL at 12:02

## 2023-11-13 RX ADMIN — RIVASTIGMINE 1.5 MILLIGRAM(S): 4.6 PATCH, EXTENDED RELEASE TRANSDERMAL at 05:19

## 2023-11-13 RX ADMIN — ROTIGOTINE 1 PATCH: 8 PATCH, EXTENDED RELEASE TRANSDERMAL at 18:53

## 2023-11-13 RX ADMIN — CARBIDOPA AND LEVODOPA 3 CAPSULE(S): 25; 100 TABLET ORAL at 15:51

## 2023-11-13 RX ADMIN — CARBIDOPA AND LEVODOPA 3 CAPSULE(S): 25; 100 TABLET ORAL at 12:03

## 2023-11-13 RX ADMIN — POLYETHYLENE GLYCOL 3350 17 GRAM(S): 17 POWDER, FOR SOLUTION ORAL at 17:04

## 2023-11-13 RX ADMIN — ROTIGOTINE 1 PATCH: 8 PATCH, EXTENDED RELEASE TRANSDERMAL at 08:03

## 2023-11-13 RX ADMIN — RIVASTIGMINE 1.5 MILLIGRAM(S): 4.6 PATCH, EXTENDED RELEASE TRANSDERMAL at 17:04

## 2023-11-13 RX ADMIN — Medication 1 APPLICATION(S): at 05:20

## 2023-11-13 RX ADMIN — MIRTAZAPINE 7.5 MILLIGRAM(S): 45 TABLET, ORALLY DISINTEGRATING ORAL at 20:11

## 2023-11-13 RX ADMIN — CARBIDOPA AND LEVODOPA 3 CAPSULE(S): 25; 100 TABLET ORAL at 08:28

## 2023-11-13 RX ADMIN — FAMOTIDINE 20 MILLIGRAM(S): 10 INJECTION INTRAVENOUS at 12:02

## 2023-11-13 RX ADMIN — LIDOCAINE 2 PATCH: 4 CREAM TOPICAL at 17:05

## 2023-11-13 RX ADMIN — Medication 0.25 MILLIGRAM(S): at 20:10

## 2023-11-13 RX ADMIN — Medication 1 APPLICATION(S): at 17:04

## 2023-11-13 RX ADMIN — LIDOCAINE 2 PATCH: 4 CREAM TOPICAL at 05:53

## 2023-11-13 RX ADMIN — ROTIGOTINE 1 PATCH: 8 PATCH, EXTENDED RELEASE TRANSDERMAL at 17:01

## 2023-11-13 RX ADMIN — CARBIDOPA AND LEVODOPA 3 CAPSULE(S): 25; 100 TABLET ORAL at 20:05

## 2023-11-13 RX ADMIN — SENNA PLUS 2 TABLET(S): 8.6 TABLET ORAL at 20:11

## 2023-11-13 RX ADMIN — Medication 3 MILLIGRAM(S): at 20:10

## 2023-11-13 RX ADMIN — Medication 40 MILLIEQUIVALENT(S): at 09:33

## 2023-11-13 RX ADMIN — GABAPENTIN 100 MILLIGRAM(S): 400 CAPSULE ORAL at 20:10

## 2023-11-13 NOTE — DISCHARGE NOTE PROVIDER - NSDCFUSCHEDAPPT_GEN_ALL_CORE_FT
Linda Gil  [facility]  NEUROLOGY [address]  Scheduled Appointment: 11/15/2023    Eneida Marshall  [facility]  NEUROSURG [address]  Scheduled Appointment: 11/17/2023

## 2023-11-13 NOTE — DISCHARGE NOTE PROVIDER - NSDCACTIVITY_GEN_ALL_CORE
Do not drive or operate machinery/Showering allowed/Do not make important decisions/Stairs allowed/Walking - Indoors allowed

## 2023-11-13 NOTE — PROGRESS NOTE ADULT - ASSESSMENT
Patient is a 64yo M with history of severe Parkinson's disease (dx in 2015), HTN, anxiety, who presented to [facility] 10/25 for DBS, now s/p Stage 1 and Stage 2 DBS. The patient follows with Dr. Gil as an outpatient.     The patient's history is consistent with parkinsonism. He has been participating well in therapy.   Programming of L GPi DBS lead went well with no complications. The patient has shown improvement in overall motor symptoms and cognition appears improved as well.   Patient was unable to tolerate taper of Neupro. Can be considered again as an outpatient. Lower doses of the patch are not on formulary.     Recommendations:  - Continue Rivastigmine 1.5mg BID  - Continue Mirtazepine 7.5mg at bedtime  - Continue Rytary 145mg 3 capsules QID at 1a-14m-6j-8p.  - Neupro 3mg daily  - Continue Lexapro 20mg daily  - Continue Gabapentin 100mg BID  - Continue Clonazepam 0.5mg BID PRN anxiety and start 0.25mg at bedtime  Bowel movements titrated to 1 bowel movement daily. Continue Miralax and Senna.   Monitor for OH  Monitor for hallucinations  Continue PT/OT/SLP as part of inpatient PD program    Case was discussed in detail with patient and primary team.  Movement Disorders Neurology will continue to follow this patient.       Patient seen and staffed with attending Dr.Persaud Angelia Cook MD   Movement Disorder fellow Patient is a 62yo M with history of severe Parkinson's disease (dx in 2015), HTN, anxiety, who presented to [facility] 10/25 for DBS, now s/p Stage 1 and Stage 2 DBS. The patient follows with Dr. Gil as an outpatient.     The patient's history is consistent with parkinsonism. He has been participating well in therapy.   Programming of L GPi DBS lead went well with no complications. The patient has shown improvement in overall motor symptoms and cognition appears improved as well.   Patient was unable to tolerate taper of Neupro. Can be considered again as an outpatient. Lower doses of the patch are not on formulary.     Recommendations:  - Continue Rivastigmine 1.5mg BID  - Continue Mirtazepine 7.5mg at bedtime  - Continue Rytary 145mg 3 capsules QID at 5r-66n-0k-8p.  - Neupro 3mg daily  - Continue Lexapro 20mg daily  - Continue Gabapentin 100mg BID  - Continue Clonazepam 0.5mg BID PRN anxiety and start 0.25mg at bedtime  Bowel movements titrated to 1 bowel movement daily. Continue Miralax and Senna.   Monitor for OH  Monitor for hallucinations  Continue PT/OT/SLP as part of inpatient PD program    Case was discussed in detail with patient and primary team.  Movement Disorders Neurology will continue to follow this patient.       Patient seen and staffed with attending Dr.Persaud Angelia Cook MD   Movement Disorder fellow Patient is a 62yo M with history of severe Parkinson's disease (dx in 2015), HTN, anxiety, who presented to [facility] 10/25 for DBS, now s/p Stage 1 and Stage 2 DBS. The patient follows with Dr. Gil as an outpatient.     The patient's history is consistent with parkinsonism. He has been participating well in therapy.   Programming of L GPi DBS lead went well with no complications. The patient has shown improvement in overall motor symptoms and cognition appears improved as well.   Patient was unable to tolerate taper of Neupro. Can be considered again as an outpatient. Lower doses of the patch are not on formulary.     Recommendations:  - Continue Rivastigmine 1.5mg BID  - Continue Mirtazepine 7.5mg at bedtime  - Continue Rytary 145mg 3 capsules QID at 2l-04y-6w-8p.  - Neupro 3mg daily  - Continue Lexapro 20mg daily  - Continue Gabapentin 100mg BID  - Continue Clonazepam 0.5mg BID PRN anxiety and start 0.25mg at bedtime  Bowel movements titrated to 1 bowel movement daily. Continue Miralax and Senna.   Monitor for OH  Monitor for hallucinations  Continue PT/OT/SLP as part of inpatient PD program    Case was discussed in detail with patient and primary team.  Movement Disorders Neurology will continue to follow this patient.       Patient seen and staffed with attending Dr.Persaud Angelia Cook MD   Movement Disorder fellow Patient is a 64yo M with history of severe Parkinson's disease (dx in 2015), HTN, anxiety, who presented to [facility] 10/25 for DBS, now s/p Stage 1 and Stage 2 DBS. The patient follows with Dr. Gil as an outpatient.     The patient's history is consistent with parkinsonism. He has been participating well in therapy.   Programming of L GPi DBS lead went well with no complications. The patient has shown improvement in overall motor symptoms and cognition appears improved as well.   Patient was unable to tolerate taper of Neupro. Can be considered again as an outpatient. Lower doses of the patch are not on formulary.     Recommendations:  - Discharge home on Neupro patch 2mg daily   - Continue Rivastigmine 1.5mg BID  - Continue Mirtazepine 7.5mg at bedtime  - Continue Rytary 145mg 3 capsules QID at 4d-88j-9k-8p.  - Neupro 3mg daily  - Continue Lexapro 20mg daily  - Continue Gabapentin 100mg BID  - Continue Clonazepam 0.5mg BID PRN anxiety and start 0.25mg at bedtime  Bowel movements titrated to 1 bowel movement daily. Continue Miralax and Senna.   Monitor for OH  Monitor for hallucinations  Continue PT/OT/SLP as part of inpatient PD program    Case was discussed in detail with patient and primary team.  Movement Disorders Neurology will continue to follow this patient.       Patient seen and staffed with attending Dr.Persaud Angelia Cook MD   Movement Disorder fellow Patient is a 64yo M with history of severe Parkinson's disease (dx in 2015), HTN, anxiety, who presented to [facility] 10/25 for DBS, now s/p Stage 1 and Stage 2 DBS. The patient follows with Dr. Gil as an outpatient.     The patient's history is consistent with parkinsonism. He has been participating well in therapy.   Programming of L GPi DBS lead went well with no complications. The patient has shown improvement in overall motor symptoms and cognition appears improved as well.   Patient was unable to tolerate taper of Neupro. Can be considered again as an outpatient. Lower doses of the patch are not on formulary.     Recommendations:  - Discharge home on Neupro patch 2mg daily   - Continue Rivastigmine 1.5mg BID  - Continue Mirtazepine 7.5mg at bedtime  - Continue Rytary 145mg 3 capsules QID at 8l-45c-2s-8p.  - Neupro 3mg daily  - Continue Lexapro 20mg daily  - Continue Gabapentin 100mg BID  - Continue Clonazepam 0.5mg BID PRN anxiety and start 0.25mg at bedtime  Bowel movements titrated to 1 bowel movement daily. Continue Miralax and Senna.   Monitor for OH  Monitor for hallucinations  Continue PT/OT/SLP as part of inpatient PD program    Case was discussed in detail with patient and primary team.  Movement Disorders Neurology will continue to follow this patient.       Patient seen and staffed with attending Dr.Persaud Angelia Cook MD   Movement Disorder fellow Patient is a 64yo M with history of severe Parkinson's disease (dx in 2015), HTN, anxiety, who presented to [facility] 10/25 for DBS, now s/p Stage 1 and Stage 2 DBS. The patient follows with Dr. Gil as an outpatient.     The patient's history is consistent with parkinsonism. He has been participating well in therapy.   Programming of L GPi DBS lead went well with no complications. The patient has shown improvement in overall motor symptoms and cognition appears improved as well.   Patient was unable to tolerate taper of Neupro. Can be considered again as an outpatient. Lower doses of the patch are not on formulary.     Recommendations:  - Discharge home on Neupro patch 2mg daily   - Continue Rivastigmine 1.5mg BID  - Continue Mirtazepine 7.5mg at bedtime  - Continue Rytary 145mg 3 capsules QID at 3x-48u-3x-8p.  - Neupro 3mg daily  - Continue Lexapro 20mg daily  - Continue Gabapentin 100mg BID  - Continue Clonazepam 0.5mg BID PRN anxiety and start 0.25mg at bedtime  Bowel movements titrated to 1 bowel movement daily. Continue Miralax and Senna.   Monitor for OH  Monitor for hallucinations  Continue PT/OT/SLP as part of inpatient PD program    Case was discussed in detail with patient and primary team.  Movement Disorders Neurology will continue to follow this patient.       Patient seen and staffed with attending Dr.Persaud Angelia Cook MD   Movement Disorder fellow

## 2023-11-13 NOTE — DISCHARGE NOTE PROVIDER - NSDCMRMEDTOKEN_GEN_ALL_CORE_FT
acetaminophen 325 mg oral tablet: 2 tab(s) orally every 6 hours As needed Mild Pain (1 - 3)  bisacodyl 5 mg oral delayed release tablet: 1 tab(s) orally once a day (at bedtime) As needed Constipation  carbidopa-levodopa 36.25 mg-145 mg oral capsule, extended release: 3 cap(s) orally 4 times a day at 8am, 12pm, 4pm and 8pm  clonazePAM 0.5 mg oral tablet: 0.5 tab(s) orally 2 times a day as needed for  anxiety Take 0.5 tab at bedtime and 0.5 tabs as needed for anxiety/off periods MDD: 0.5mg  erythromycin 0.5% ophthalmic ointment: 1 application to each affected eye 4 times a day  escitalopram 20 mg oral tablet: 1 tab(s) orally once a day  famotidine 20 mg oral tablet: 1 tab(s) orally once a day  gabapentin 100 mg oral capsule: 1 cap(s) orally 2 times a day  hydrocortisone 1% topical cream: 1 Apply topically to affected area 2 times a day  lidocaine 4% topical film: Apply topically to affected area once a day  melatonin 3 mg oral tablet: 1 tab(s) orally once a day (at bedtime)  mirtazapine 7.5 mg oral tablet: 1 tab(s) orally once a day (at bedtime)  polyethylene glycol 3350 oral powder for reconstitution: 17 gram(s) orally 2 times a day  rivastigmine 1.5 mg oral capsule: 1 cap(s) orally 2 times a day  rotigotine 3 mg/24 hr transdermal film, extended release: 1 patch transdermal once a day  senna leaf extract oral tablet: 2 tab(s) orally once a day (at bedtime)   acetaminophen 325 mg oral tablet: 2 tab(s) orally every 6 hours As needed Mild Pain (1 - 3)  bisacodyl 5 mg oral delayed release tablet: 1 tab(s) orally once a day (at bedtime) As needed Constipation  carbidopa-levodopa 36.25 mg-145 mg oral capsule, extended release: 3 cap(s) orally 4 times a day at 8am, 12pm, 4pm and 8pm  clonazePAM 0.5 mg oral tablet: 0.5 tab(s) orally 2 times a day as needed for  anxiety Take 0.5 tab at bedtime and 0.5 tabs as needed for anxiety/off periods MDD: 0.5mg  erythromycin 0.5% ophthalmic ointment: 1 application to each affected eye 4 times a day  escitalopram 20 mg oral tablet: 1 tab(s) orally once a day  famotidine 20 mg oral tablet: 1 tab(s) orally once a day  gabapentin 100 mg oral capsule: 1 cap(s) orally 2 times a day  hydrocortisone 1% topical cream: 1 Apply topically to affected area 2 times a day  lidocaine 4% topical film: Apply topically to affected area once a day  melatonin 3 mg oral tablet: 1 tab(s) orally once a day (at bedtime)  mirtazapine 7.5 mg oral tablet: 1 tab(s) orally once a day (at bedtime)  Neupro 2 mg/24 hr transdermal film, extended release: 1 patch transdermally once a day  polyethylene glycol 3350 oral powder for reconstitution: 17 gram(s) orally 2 times a day  rivastigmine 1.5 mg oral capsule: 1 cap(s) orally 2 times a day  senna leaf extract oral tablet: 2 tab(s) orally once a day (at bedtime)

## 2023-11-13 NOTE — PROGRESS NOTE ADULT - NS ATTEND AMEND GEN_ALL_CORE FT
I have personally seen and examined the patient with the NP. Medical records reviewed. I have made amendments to the documentation where necessary and adjusted the history, physical examination, and plan as documented by the NP.    Multidisciplinary team meeting today:  patient's functional goals and needs, functional and clinical  progress were discussed, barriers to discharge were identified. Anticipate discharge home with home care, 24/7 supervision for safety.    EDOD 11/14/23 I have personally seen and examined the patient with the NP. Medical records reviewed. I have made amendments to the documentation where necessary and adjusted the history, physical examination, and plan as documented by the NP.    Multidisciplinary team meeting today:  patient's functional goals and needs, functional and clinical  progress were discussed, barriers to discharge were identified. Anticipate discharge home with home care, 24/7 supervision for safety.    EDOD 11/14/23    40 min spent

## 2023-11-13 NOTE — PROGRESS NOTE ADULT - ASSESSMENT
Patient is a 64yo M with history of severe Parkinson's disease (dx in 2015), HTN, anxiety, who presented to [facility] 10/25 for DBS, now s/p Stage 1 and Stage 2 DBS. Hospital course complicated by brochospasm requiring overnight observation postoperatively, also now with dysphagia. Admitted to [facility] acute inpatient rehab on 10/31 for ADL, gait, and functional impairments.     #Parkinson's Disease now with gait Instability, ADL impairments and Functional impairments  - Continue Comprehensive Rehab Program of PT/OT/SLP    Parkinson's related motor symptoms    #Rigidity/Bradykinesia/wearing off  -S/p Stage 1 Deep Brain Surgery Bilateral Implantation Into Globus Pallidus Internus With Leksell Frame on 10/25/23    -S/p Stage 2 on 10/30/23 - monitored overnight for hypoxia 2/2 bronchospasm  -completed Keflex 500mg BID   -Continue Rytary 145mg 3 tabs at 8a, 12pm, 4pm and 8pm  -Continue Neupro patch 3mg /24hours daily - was every other day now back to daily 11/9  -Movement disorders following - DBS programming today     Parkinson's related non-motor symptoms    #Mood/anxiety  -Continue Lexapro 20mg daily  -Melatonin 3mg at bedtime  -Continue clonazepam 0.25mg at bedtime 11/9  -Continue Clonazepam 0.5mg BID PRN  Neuropsychology to follow    #Orthostatic hypotension  -Monitor OH vital signs    #Pain control  - Tylenol PRN  - Lidocaine patches to b/l knees  -Continue gabapentin 100mg BID    #GI/Bowel Management/Constipation  - Continue Senna at bedtime   - Miralax BID    - Bisacodyl PRN  - Pepcid 20mg daily    #Bladder management  - Continue to monitor PVR q 8 hours (SC if > 400)  - Monitor UO, voiding without issues     Concurrent medical problems    #DVT Prophylaxis  - TEDs   - pharmacologic ppx contraindicated given recent surgery    #Skin:  -3 surgical sites on top of skull. Clean dry and intact. Staples present. No fluctuance, erythema, drainage, or oozing at time of exam.  -Do not remove dressings   -Can shower 2 day post-op but do not scrub incision  -Continue hydorcortisone topical to rash BID 11/7    FEN   - Diet - Regular with thins [CCHO, DASH/TLC]    - Dysphagia  SLP - evaluation and treatment appreciated     Precautions / PROPHYLAXIS:   - Falls  - ortho: Weight bearing status: WBAT   - Lungs: Aspiration, Incentive Spirometer   - Pressure injury/Skin: Turn Q2hrs while in bed, OOB to Chair, PT/OT      TEAM MEETING 11/13/23  SW:  Family training provided  OT: min for adl and transfers  PT: svt for transfer, amb 150' with rollator  SLP: mod cog deficits   barriers: dec cog, dec balance,dec strength   EDOD: Home  11/14/23

## 2023-11-13 NOTE — DISCHARGE NOTE PROVIDER - PROVIDER TOKENS
PROVIDER:[TOKEN:[[id number]],FOLLOWUP:[2 weeks]],PROVIDER:[TOKEN:[[id number]],FOLLOWUP:[1 week]]

## 2023-11-13 NOTE — DISCHARGE NOTE NURSING/CASE MANAGEMENT/SOCIAL WORK - PATIENT PORTAL LINK FT
You can access the FollowMyHealth Patient Portal offered by [facility] by registering at the following website: [email or web address]. By joining [facility]’s FollowMyHealth portal, you will also be able to view your health information using other applications (apps) compatible with our system.

## 2023-11-13 NOTE — PROGRESS NOTE ADULT - SUBJECTIVE AND OBJECTIVE BOX
Movement Disorders Neurology  Consult Progress Note    Interval history:  patient seen and examined at bedside  wife expressed sh cannot take him home, as she feels she wont be able to take care of him at home  Patient is able to participate with therapy       HPI:    Patient is a 64yo M with history of severe Parkinson's disease (dx in 2015), HTN, anxiety, who presented to [facility] 10/25 for DBS, now s/p Stage 1 and Stage 2 DBS. The patient follows with Dr. Gil as an outpatient.     He was diagnosed with Parkinson's disease in 2015 when he was having dragging of left leg, and difficulty moving. He was started on levodopa and it worked well for a few years. He now has B/L stiffness, and slowness, but no tremor. He has severe on/off motor fluctuations. He has sudden severe wearing offs of medications, and effects of medication lasts 1-2 hours then he has severe stiffness and slowness. He experiencing freezing of gait, and he can't speak or think at those times, but has not fallen.     His wife describes an episode where he suddenly went OFF and was gripping the walker tightly. She tries to tell him what to do to be safe, but states that he does his own thing. She denies him being impulsive.    He walks with walker most of the time, and he can usually do his own bathing/dressing/toileting/feeding most days, but sometimes needs help. The patient's wife is the primary caregiver, but also works.     After Stage II, post-operatively, he became hypoxic and required monitoring overnight due to bronchospasm. Patient was evaluated by PM&R and therapy for functional deficits and gait/ ADL impairments and recommended acute rehabilitation. Patient was medically optimized for discharge to [facility] on 10/31.    The patient's wife denies a history of constipation.   She states sometimes he gets confused  She denies a history of talking in sleep or acting out dreams.   No dizziness/lightheadedness when standing.   He has slight tremors in the right hand but tremors are not his predominant issue  They tried increasing his Rytary to 4 capsules 4 times a day but he did not do well with this and became more confused.   His wife notes that sometimes she gives him an extra capsule as needed    Current PD meds:  Lexapro 20mg daily  Gabapentin 100mg daily and at bedtime  Neupro 3mg daily  Rytary 145mg 3 capsules at 1a-02s-5f-8p  Clonazepam 0.5mg BID PRN  Dulxolax, Miralax, Senna    Physical examination:  Patient is awake and alert, participates in examination. Follows simple commands. Patient appears to have some disinhibition  able to answer the name, month, and president correctly  Face is mildly masked. Voice is mild-moderately hypophonic, pressured and dysarthria has improved  No tremors  Mild-moderate rigidity bilaterally  Mild-moderate bradykinesia, left more than right  Gait deferred      DBS programming done 11/6/23  First programming   Left GPi target    Current setting  Contact 2  Setting : 1.5/60/125    Based  on brain sense Contact 1 and contact 2 were choosen    Contact 1: tried as high of stimulation of 4 at which he was able to intiate movement better, one person assist to get up from sitting position, good stride lenght, speech was better  Contact 2, stimulation of 1.5 , able to get from sitting position by himself, initiation of movement better, speech better, bradykinesia improved , walked the entire hallway  higher stim of > 1.5 on contact 2, did not show any improvement in bradykinesia, speech and gait     Movement Disorders Neurology  Consult Progress Note    Interval history:  patient seen and examined at bedside  wife expressed sh cannot take him home, as she feels she wont be able to take care of him at home  Patient is able to participate with therapy       HPI:    Patient is a 64yo M with history of severe Parkinson's disease (dx in 2015), HTN, anxiety, who presented to [facility] 10/25 for DBS, now s/p Stage 1 and Stage 2 DBS. The patient follows with Dr. Gil as an outpatient.     He was diagnosed with Parkinson's disease in 2015 when he was having dragging of left leg, and difficulty moving. He was started on levodopa and it worked well for a few years. He now has B/L stiffness, and slowness, but no tremor. He has severe on/off motor fluctuations. He has sudden severe wearing offs of medications, and effects of medication lasts 1-2 hours then he has severe stiffness and slowness. He experiencing freezing of gait, and he can't speak or think at those times, but has not fallen.     His wife describes an episode where he suddenly went OFF and was gripping the walker tightly. She tries to tell him what to do to be safe, but states that he does his own thing. She denies him being impulsive.    He walks with walker most of the time, and he can usually do his own bathing/dressing/toileting/feeding most days, but sometimes needs help. The patient's wife is the primary caregiver, but also works.     After Stage II, post-operatively, he became hypoxic and required monitoring overnight due to bronchospasm. Patient was evaluated by PM&R and therapy for functional deficits and gait/ ADL impairments and recommended acute rehabilitation. Patient was medically optimized for discharge to [facility] on 10/31.    The patient's wife denies a history of constipation.   She states sometimes he gets confused  She denies a history of talking in sleep or acting out dreams.   No dizziness/lightheadedness when standing.   He has slight tremors in the right hand but tremors are not his predominant issue  They tried increasing his Rytary to 4 capsules 4 times a day but he did not do well with this and became more confused.   His wife notes that sometimes she gives him an extra capsule as needed    Current PD meds:  Lexapro 20mg daily  Gabapentin 100mg daily and at bedtime  Neupro 3mg daily  Rytary 145mg 3 capsules at 8i-27w-2p-8p  Clonazepam 0.5mg BID PRN  Dulxolax, Miralax, Senna    Physical examination:  Patient is awake and alert, participates in examination. Follows simple commands. Patient appears to have some disinhibition  able to answer the name, month, and president correctly  Face is mildly masked. Voice is mild-moderately hypophonic, pressured and dysarthria has improved  No tremors  Mild-moderate rigidity bilaterally  Mild-moderate bradykinesia, left more than right  Gait deferred      DBS programming done 11/6/23  First programming   Left GPi target    Current setting  Contact 2  Setting : 1.5/60/125    Based  on brain sense Contact 1 and contact 2 were choosen    Contact 1: tried as high of stimulation of 4 at which he was able to intiate movement better, one person assist to get up from sitting position, good stride lenght, speech was better  Contact 2, stimulation of 1.5 , able to get from sitting position by himself, initiation of movement better, speech better, bradykinesia improved , walked the entire hallway  higher stim of > 1.5 on contact 2, did not show any improvement in bradykinesia, speech and gait     Movement Disorders Neurology  Consult Progress Note    Interval history:  patient seen and examined at bedside  wife expressed sh cannot take him home, as she feels she wont be able to take care of him at home  Patient is able to participate with therapy       HPI:    Patient is a 62yo M with history of severe Parkinson's disease (dx in 2015), HTN, anxiety, who presented to [facility] 10/25 for DBS, now s/p Stage 1 and Stage 2 DBS. The patient follows with Dr. Gil as an outpatient.     He was diagnosed with Parkinson's disease in 2015 when he was having dragging of left leg, and difficulty moving. He was started on levodopa and it worked well for a few years. He now has B/L stiffness, and slowness, but no tremor. He has severe on/off motor fluctuations. He has sudden severe wearing offs of medications, and effects of medication lasts 1-2 hours then he has severe stiffness and slowness. He experiencing freezing of gait, and he can't speak or think at those times, but has not fallen.     His wife describes an episode where he suddenly went OFF and was gripping the walker tightly. She tries to tell him what to do to be safe, but states that he does his own thing. She denies him being impulsive.    He walks with walker most of the time, and he can usually do his own bathing/dressing/toileting/feeding most days, but sometimes needs help. The patient's wife is the primary caregiver, but also works.     After Stage II, post-operatively, he became hypoxic and required monitoring overnight due to bronchospasm. Patient was evaluated by PM&R and therapy for functional deficits and gait/ ADL impairments and recommended acute rehabilitation. Patient was medically optimized for discharge to [facility] on 10/31.    The patient's wife denies a history of constipation.   She states sometimes he gets confused  She denies a history of talking in sleep or acting out dreams.   No dizziness/lightheadedness when standing.   He has slight tremors in the right hand but tremors are not his predominant issue  They tried increasing his Rytary to 4 capsules 4 times a day but he did not do well with this and became more confused.   His wife notes that sometimes she gives him an extra capsule as needed    Current PD meds:  Lexapro 20mg daily  Gabapentin 100mg daily and at bedtime  Neupro 3mg daily  Rytary 145mg 3 capsules at 1m-36c-[id number]-8p  Clonazepam 0.5mg BID PRN  Dulxolax, Miralax, Senna    Physical examination:  Patient is awake and alert, participates in examination. Follows simple commands. Patient appears to have some disinhibition  able to answer the name, month, and president correctly  Face is mildly masked. Voice is mild-moderately hypophonic, pressured and dysarthria has improved  No tremors  Mild-moderate rigidity bilaterally  Mild-moderate bradykinesia, left more than right  Gait deferred      DBS programming done 11/6/23  First programming   Left GPi target    Current setting  Contact 2  Setting : 1.5/60/125    Based  on brain sense Contact 1 and contact 2 were choosen    Contact 1: tried as high of stimulation of 4 at which he was able to intiate movement better, one person assist to get up from sitting position, good stride lenght, speech was better  Contact 2, stimulation of 1.5 , able to get from sitting position by himself, initiation of movement better, speech better, bradykinesia improved , walked the entire hallway  higher stim of > 1.5 on contact 2, did not show any improvement in bradykinesia, speech and gait     Movement Disorders Neurology  Consult Progress Note    Interval history:  patient seen and examined at bedside  wife expressed sh cannot take him home, as she feels she wont be able to take care of him at home. Per wife patient had some hallucination last night, but was redirectable.  Patient is able to participate with therapy       HPI:    Patient is a 64yo M with history of severe Parkinson's disease (dx in 2015), HTN, anxiety, who presented to [facility] 10/25 for DBS, now s/p Stage 1 and Stage 2 DBS. The patient follows with Dr. Gil as an outpatient.     He was diagnosed with Parkinson's disease in 2015 when he was having dragging of left leg, and difficulty moving. He was started on levodopa and it worked well for a few years. He now has B/L stiffness, and slowness, but no tremor. He has severe on/off motor fluctuations. He has sudden severe wearing offs of medications, and effects of medication lasts 1-2 hours then he has severe stiffness and slowness. He experiencing freezing of gait, and he can't speak or think at those times, but has not fallen.     His wife describes an episode where he suddenly went OFF and was gripping the walker tightly. She tries to tell him what to do to be safe, but states that he does his own thing. She denies him being impulsive.    He walks with walker most of the time, and he can usually do his own bathing/dressing/toileting/feeding most days, but sometimes needs help. The patient's wife is the primary caregiver, but also works.     After Stage II, post-operatively, he became hypoxic and required monitoring overnight due to bronchospasm. Patient was evaluated by PM&R and therapy for functional deficits and gait/ ADL impairments and recommended acute rehabilitation. Patient was medically optimized for discharge to [facility] on 10/31.    The patient's wife denies a history of constipation.   She states sometimes he gets confused  She denies a history of talking in sleep or acting out dreams.   No dizziness/lightheadedness when standing.   He has slight tremors in the right hand but tremors are not his predominant issue  They tried increasing his Rytary to 4 capsules 4 times a day but he did not do well with this and became more confused.   His wife notes that sometimes she gives him an extra capsule as needed    Current PD meds:  Lexapro 20mg daily  Gabapentin 100mg daily and at bedtime  Neupro 3mg daily  Rytary 145mg 3 capsules at 3r-05p-8k-8p  Clonazepam 0.5mg BID PRN  Dulxolax, Miralax, Senna    Physical examination:  Patient is awake and alert, participates in examination. Follows simple commands. Patient appears to have some disinhibition  able to answer the name, month, and president correctly  Face is mildly masked. Voice is mild-moderately hypophonic, pressured and dysarthria has improved  No tremors  Mild-moderate rigidity bilaterally  Mild-moderate bradykinesia, left more than right  Gait deferred      DBS programming done 11/6/23  First programming   Left GPi target    Current setting  Contact 2  Setting : 1.5/60/125    Based  on brain sense Contact 1 and contact 2 were choosen    Contact 1: tried as high of stimulation of 4 at which he was able to intiate movement better, one person assist to get up from sitting position, good stride lenght, speech was better  Contact 2, stimulation of 1.5 , able to get from sitting position by himself, initiation of movement better, speech better, bradykinesia improved , walked the entire hallway  higher stim of > 1.5 on contact 2, did not show any improvement in bradykinesia, speech and gait     Movement Disorders Neurology  Consult Progress Note    Interval history:  patient seen and examined at bedside  wife expressed sh cannot take him home, as she feels she wont be able to take care of him at home. Per wife patient had some hallucination last night, but was redirectable.  Patient is able to participate with therapy       HPI:    Patient is a 62yo M with history of severe Parkinson's disease (dx in 2015), HTN, anxiety, who presented to [facility] 10/25 for DBS, now s/p Stage 1 and Stage 2 DBS. The patient follows with Dr. Gil as an outpatient.     He was diagnosed with Parkinson's disease in 2015 when he was having dragging of left leg, and difficulty moving. He was started on levodopa and it worked well for a few years. He now has B/L stiffness, and slowness, but no tremor. He has severe on/off motor fluctuations. He has sudden severe wearing offs of medications, and effects of medication lasts 1-2 hours then he has severe stiffness and slowness. He experiencing freezing of gait, and he can't speak or think at those times, but has not fallen.     His wife describes an episode where he suddenly went OFF and was gripping the walker tightly. She tries to tell him what to do to be safe, but states that he does his own thing. She denies him being impulsive.    He walks with walker most of the time, and he can usually do his own bathing/dressing/toileting/feeding most days, but sometimes needs help. The patient's wife is the primary caregiver, but also works.     After Stage II, post-operatively, he became hypoxic and required monitoring overnight due to bronchospasm. Patient was evaluated by PM&R and therapy for functional deficits and gait/ ADL impairments and recommended acute rehabilitation. Patient was medically optimized for discharge to [facility] on 10/31.    The patient's wife denies a history of constipation.   She states sometimes he gets confused  She denies a history of talking in sleep or acting out dreams.   No dizziness/lightheadedness when standing.   He has slight tremors in the right hand but tremors are not his predominant issue  They tried increasing his Rytary to 4 capsules 4 times a day but he did not do well with this and became more confused.   His wife notes that sometimes she gives him an extra capsule as needed    Current PD meds:  Lexapro 20mg daily  Gabapentin 100mg daily and at bedtime  Neupro 3mg daily  Rytary 145mg 3 capsules at 8g-14s-6o-8p  Clonazepam 0.5mg BID PRN  Dulxolax, Miralax, Senna    Physical examination:  Patient is awake and alert, participates in examination. Follows simple commands. Patient appears to have some disinhibition  able to answer the name, month, and president correctly  Face is mildly masked. Voice is mild-moderately hypophonic, pressured and dysarthria has improved  No tremors  Mild-moderate rigidity bilaterally  Mild-moderate bradykinesia, left more than right  Gait deferred      DBS programming done 11/6/23  First programming   Left GPi target    Current setting  Contact 2  Setting : 1.5/60/125    Based  on brain sense Contact 1 and contact 2 were choosen    Contact 1: tried as high of stimulation of 4 at which he was able to intiate movement better, one person assist to get up from sitting position, good stride lenght, speech was better  Contact 2, stimulation of 1.5 , able to get from sitting position by himself, initiation of movement better, speech better, bradykinesia improved , walked the entire hallway  higher stim of > 1.5 on contact 2, did not show any improvement in bradykinesia, speech and gait     Movement Disorders Neurology  Consult Progress Note    Interval history:  patient seen and examined at bedside  wife expressed sh cannot take him home, as she feels she wont be able to take care of him at home. Per wife patient had some hallucination last night, but was redirectable.  Patient is able to participate with therapy       HPI:    Patient is a 62yo M with history of severe Parkinson's disease (dx in 2015), HTN, anxiety, who presented to [facility] 10/25 for DBS, now s/p Stage 1 and Stage 2 DBS. The patient follows with Dr. Gil as an outpatient.     He was diagnosed with Parkinson's disease in 2015 when he was having dragging of left leg, and difficulty moving. He was started on levodopa and it worked well for a few years. He now has B/L stiffness, and slowness, but no tremor. He has severe on/off motor fluctuations. He has sudden severe wearing offs of medications, and effects of medication lasts 1-2 hours then he has severe stiffness and slowness. He experiencing freezing of gait, and he can't speak or think at those times, but has not fallen.     His wife describes an episode where he suddenly went OFF and was gripping the walker tightly. She tries to tell him what to do to be safe, but states that he does his own thing. She denies him being impulsive.    He walks with walker most of the time, and he can usually do his own bathing/dressing/toileting/feeding most days, but sometimes needs help. The patient's wife is the primary caregiver, but also works.     After Stage II, post-operatively, he became hypoxic and required monitoring overnight due to bronchospasm. Patient was evaluated by PM&R and therapy for functional deficits and gait/ ADL impairments and recommended acute rehabilitation. Patient was medically optimized for discharge to [facility] on 10/31.    The patient's wife denies a history of constipation.   She states sometimes he gets confused  She denies a history of talking in sleep or acting out dreams.   No dizziness/lightheadedness when standing.   He has slight tremors in the right hand but tremors are not his predominant issue  They tried increasing his Rytary to 4 capsules 4 times a day but he did not do well with this and became more confused.   His wife notes that sometimes she gives him an extra capsule as needed    Current PD meds:  Lexapro 20mg daily  Gabapentin 100mg daily and at bedtime  Neupro 3mg daily  Rytary 145mg 3 capsules at 6r-11m-5z-8p  Clonazepam 0.5mg BID PRN  Dulxolax, Miralax, Senna    Physical examination:  Patient is awake and alert, participates in examination. Follows simple commands. Patient appears to have some disinhibition  able to answer the name, month, and president correctly  Face is mildly masked. Voice is mild-moderately hypophonic, pressured and dysarthria has improved  No tremors  Mild-moderate rigidity bilaterally  Mild-moderate bradykinesia, left more than right  Gait deferred      DBS programming done 11/6/23  First programming   Left GPi target    Current setting  Contact 2  Setting : 1.5/60/125    Based  on brain sense Contact 1 and contact 2 were choosen    Contact 1: tried as high of stimulation of 4 at which he was able to intiate movement better, one person assist to get up from sitting position, good stride lenght, speech was better  Contact 2, stimulation of 1.5 , able to get from sitting position by himself, initiation of movement better, speech better, bradykinesia improved , walked the entire hallway  higher stim of > 1.5 on contact 2, did not show any improvement in bradykinesia, speech and gait

## 2023-11-13 NOTE — CHART NOTE - NSCHARTNOTEFT_GEN_A_CORE
Nutrition Follow Up Note  Source: Medical Record [X] Patient [X] Family [ ]         Diet: Regular  Pt tolerating diet with good PO intake, eating >75% of meals per nursing flow sheets. Pt observed during lunch with poor PO intake of meal served, requested an alternative. Diet office informed. Encouraged PO intake of alternate meal. Denies nausea, vomiting, diarrhea, constipation.     Enteral/Parenteral Nutrition: N/A    Current Weight: 217.5 lbs (10/31)    Pertinent Medications: MEDICATIONS  (STANDING):  carbidopa 36.25 mG/levodopa 145 mG ER 3 Capsule(s) Oral <User Schedule>  clonazePAM Oral Disintegrating Tablet 0.25 milliGRAM(s) Oral at bedtime  erythromycin   Ointment 1 Application(s) Both EYES four times a day  escitalopram 20 milliGRAM(s) Oral daily  famotidine    Tablet 20 milliGRAM(s) Oral daily  gabapentin 100 milliGRAM(s) Oral daily  gabapentin 100 milliGRAM(s) Oral at bedtime  hydrocortisone 1% Cream 1 Application(s) Topical two times a day  lidocaine   4% Patch 2 Patch Transdermal <User Schedule>  melatonin 3 milliGRAM(s) Oral at bedtime  mirtazapine 7.5 milliGRAM(s) Oral at bedtime  polyethylene glycol 3350 17 Gram(s) Oral two times a day  rivastigmine 1.5 milliGRAM(s) Oral two times a day  rotigotine patch 3 mG/24 Hr(s) 1 Patch Transdermal <User Schedule>  senna 2 Tablet(s) Oral at bedtime    MEDICATIONS  (PRN):  acetaminophen     Tablet .. 650 milliGRAM(s) Oral every 6 hours PRN Mild Pain (1 - 3)  bisacodyl 5 milliGRAM(s) Oral at bedtime PRN Constipation  clonazePAM  Tablet 0.5 milliGRAM(s) Oral two times a day PRN for anxiety      Pertinent Labs:  11-13 Na139 mmol/L Glu 112 mg/dL<H> K+ 3.3 mmol/L<L> Cr  0.59 mg/dL BUN 14 mg/dL 11-13 Alb 2.8 g/dL<L>        Skin: surgical incisions per nursing flow sheets    Edema: No edema per nursing flow sheets     Last BM: on 11-12 Per nursing flowsheets     Estimated Needs:   [X] No Change since Previous Assessment  [ ] Recalculated:     Previous Nutrition Diagnosis:   Inadequate oral intake    Nutrition Diagnosis is [X] Ongoing  - improving    New Nutrition Diagnosis: [X] Not Applicable  [ ] Inadequate Protein Energy Intake   [ ] Inadequate Oral Intake   [ ] Excessive Energy Intake   [ ] Increased Nutrient Needs   [ ] Obesity   [ ] Altered GI Function   [ ] Unintended Weight Loss   [ ] Food & Nutrition Related Knowledge Deficit  [ ] Limited Adherence to nutrition related recommendations   [ ] Malnutrition      Interventions:   1. Recommend continuing with current plan of care  2. encourage PO intake  3. Obtain and honor food preferences as able    Monitoring & Evaluation:   [X] Weights   [X] PO Intake   [X] Follow Up (Per Protocol)  [X] Tolerance to Diet Prescription   [X] Other: Labs     RD Remains Available.  Hayley Street RD

## 2023-11-13 NOTE — PROGRESS NOTE ADULT - SUBJECTIVE AND OBJECTIVE BOX
SUBJECTIVE/ROS: Patient evaluated while in the chair. Wife at bedside and updates provided and questions answered. No acute events overnight. He denies chest pain, fever, chills, nausea, vomiting, abdominal pain, headache, or BLE pain.     HPI:  Patient is a 64yo M with history of severe Parkinson's disease (dx in 2015), HTN, anxiety, who presented to [facility] 10/25 for DBS, now s/p Stage 1 and Stage 2 DBS.He was diagnosed with Parkinson's disease in 2015 when he was having dragging of left leg, and difficulty moving. He was started on levodopa and it worked well for a few years. He now has B/L stiffness, and slowness, but no tremor. He is also experiencing neck discomfort and tilting of his neck forward. He has severe on/off motor fluctuations. He has sudden severe wearing offs of medications, and effects of medication lasts 1-2 hours then he has severe stiffness and slowness. He moves his neck better when he takes the medication. He experiencing freezing of gait, and he can't speak or think at those times. He walks with walker most of the time, and he can usually do his own bathing/dressing/toileting/feeding most days, but sometimes needs help. He is s/p Stage 1 Deep Brain Surgery Bilateral Implantation Into Globus Pallidus Internus With Leksell Frame on 10/25/23 and plan for Stage 2 on 10/30/23. He tolerated procedure well. He was evaluated for admission to acute inpatient rehab and admitted to [facility] on 10/27/23. Subsequently transferred to [facility] for Stage 2 DBS 10/30. Post-operatively, he became hypoxic and required monitoring overnight due to bronchospasm. Patient was evaluated by PM&R and therapy for functional deficits and gait/ ADL impairments and recommended acute rehabilitation. Patient was medically optimized for discharge to [facility] on 10/31.        Vital Signs Last 24 Hrs  T(C): 36.6 (13 Nov 2023 08:20), Max: 36.9 (12 Nov 2023 20:00)  T(F): 97.9 (13 Nov 2023 08:20), Max: 98.4 (12 Nov 2023 20:00)  HR: 60 (13 Nov 2023 08:20) (60 - 67)  BP: 146/86 (13 Nov 2023 08:20) (128/79 - 146/86)  BP(mean): --  RR: 16 (13 Nov 2023 08:20) (16 - 16)  SpO2: 95% (13 Nov 2023 08:20) (95% - 96%)    Parameters below as of 13 Nov 2023 08:20  Patient On (Oxygen Delivery Method): room air          PHYSICAL EXAM  Constitutional - NAD, Comfortable  HEENT - NCAT, EOMI  Neck - Supple, No limited ROM  Chest - Breathing comfortably   Cardiovascular - RRR, S1S2  Abdomen -BS+, Soft, NTND  Extremities - No C/C/E, No calf tenderness   Neurologic Exam -aox2-3, lopez 5/5, hypophonic, bradykinetic         LABS:                          12.5   4.31  )-----------( 247      ( 13 Nov 2023 06:00 )             39.6     11-13    139  |  104  |  14  ----------------------------<  112<H>  3.3<L>   |  26  |  0.59    Ca    8.3<L>      13 Nov 2023 06:00    TPro  5.8<L>  /  Alb  2.8<L>  /  TBili  0.3  /  DBili  x   /  AST  17  /  ALT  8<L>  /  AlkPhos  73  11-13    LIVER FUNCTIONS - ( 13 Nov 2023 06:00 )  Alb: 2.8 g/dL / Pro: 5.8 g/dL / ALK PHOS: 73 U/L / ALT: 8 U/L / AST: 17 U/L / GGT: x                     MEDICATIONS  (STANDING):  carbidopa 36.25 mG/levodopa 145 mG ER 3 Capsule(s) Oral <User Schedule>  clonazePAM Oral Disintegrating Tablet 0.25 milliGRAM(s) Oral at bedtime  erythromycin   Ointment 1 Application(s) Both EYES four times a day  escitalopram 20 milliGRAM(s) Oral daily  famotidine    Tablet 20 milliGRAM(s) Oral daily  gabapentin 100 milliGRAM(s) Oral daily  gabapentin 100 milliGRAM(s) Oral at bedtime  hydrocortisone 1% Cream 1 Application(s) Topical two times a day  lidocaine   4% Patch 2 Patch Transdermal <User Schedule>  melatonin 3 milliGRAM(s) Oral at bedtime  mirtazapine 7.5 milliGRAM(s) Oral at bedtime  polyethylene glycol 3350 17 Gram(s) Oral two times a day  rivastigmine 1.5 milliGRAM(s) Oral two times a day  rotigotine patch 3 mG/24 Hr(s) 1 Patch Transdermal <User Schedule>  senna 2 Tablet(s) Oral at bedtime    MEDICATIONS  (PRN):  acetaminophen     Tablet .. 650 milliGRAM(s) Oral every 6 hours PRN Mild Pain (1 - 3)  bisacodyl 5 milliGRAM(s) Oral at bedtime PRN Constipation  clonazePAM  Tablet 0.5 milliGRAM(s) Oral two times a day PRN for anxiety

## 2023-11-13 NOTE — DISCHARGE NOTE PROVIDER - HOSPITAL COURSE
HPI:  Patient is a 64yo M with history of severe Parkinson's disease (dx in 2015), HTN, anxiety, who presented to [facility] 10/25 for DBS, now s/p Stage 1 and Stage 2 DBS.He was diagnosed with Parkinson's disease in 2015 when he was having dragging of left leg, and difficulty moving. He was started on levodopa and it worked well for a few years. He now has B/L stiffness, and slowness, but no tremor. He is also experiencing neck discomfort and tilting of his neck forward. He has severe on/off motor fluctuations. He has sudden severe wearing offs of medications, and effects of medication lasts 1-2 hours then he has severe stiffness and slowness. He moves his neck better when he takes the medication. He experiencing freezing of gait, and he can't speak or think at those times. He walks with walker most of the time, and he can usually do his own bathing/dressing/toileting/feeding most days, but sometimes needs help. He is s/p Stage 1 Deep Brain Surgery Bilateral Implantation Into Globus Pallidus Internus With Leksell Frame on 10/25/23 and plan for Stage 2 on 10/30/23. He tolerated procedure well. He was evaluated for admission to acute inpatient rehab and admitted to [facility] on 10/27/23. Subsequently transferred to [facility] for Stage 2 DBS 10/30. Post-operatively, he became hypoxic and required monitoring overnight due to bronchospasm. Patient was evaluated by PM&R and therapy for functional deficits and gait/ ADL impairments and recommended acute rehabilitation. Patient was medically optimized for discharge to [facility] on 10/31.       Rehab course significant for placement of IPG stage 2 and turn on of DBS for which he tolerated well. Addition of clonazepam assisted with sleep and anxiety and all other medical co-morbidites were stable.    Pt tolerated course of inpatient pt/ot/slp rehab with significant functional improvements and met rehab goals prior to discharge.     Pt was medically cleared on 11/14/23 for discharge to home with home care.

## 2023-11-13 NOTE — DISCHARGE NOTE PROVIDER - NSDCHHCONTACT_GEN_ALL_CORE_FT
ambulatory As certified below, I, or a nurse practitioner or physician assistant working with me, had a face-to-face encounter that meets the physician face-to-face encounter requirements.

## 2023-11-13 NOTE — DISCHARGE NOTE PROVIDER - CARE PROVIDER_API CALL
Addison Garcia  Neurosurgery  [address], [address]  [place] [address]  Phone: [phone]  Fax: [phone]  Follow Up Time: 2 weeks    Linda Gil  Neurology  [address], [address]  [place] [address]  Phone: [phone]  Fax: [phone]  Follow Up Time: 1 week

## 2023-11-13 NOTE — DISCHARGE NOTE PROVIDER - NSDCCPCAREPLAN_GEN_ALL_CORE_FT
PRINCIPAL DISCHARGE DIAGNOSIS  Diagnosis: Parkinsons  Assessment and Plan of Treatment: -S/p Stage 1 Deep Brain Surgery Bilateral Implantation Into Globus Pallidus Internus With Leksell Frame on 10/25/23    -S/p Stage 2 on 10/30/23 -  -Continue Rytary 145mg 3 tabs at 8a, 12pm, 4pm and 8pm  -Stop  Neupro patch 3mg /24hours and changed to 2mg patch/24 hours   -Follow up with Dr. Garcia for staple removal  -Follow up with Dr. Gil for further programming         SECONDARY DISCHARGE DIAGNOSES  Diagnosis: Anxiety  Assessment and Plan of Treatment: -Continue Lexapro 20mg daily  -Melatonin 3mg at bedtime  -Continue clonazepam 0.25mg at bedtime and 0.25mg PRN for anxiety/off periods       Diagnosis: Constipation  Assessment and Plan of Treatment: - Continue Senna at bedtime   - Miralax BID    - Bisacodyl PRN  - Pepcid 20mg daily  -Titrate to 1-2 bm/day

## 2023-11-13 NOTE — DISCHARGE NOTE PROVIDER - CARE PROVIDERS DIRECT ADDRESSES
,DirectAddress_Unknown,[email or web address]

## 2023-11-14 VITALS
OXYGEN SATURATION: 96 % | TEMPERATURE: 98 F | SYSTOLIC BLOOD PRESSURE: 142 MMHG | HEART RATE: 62 BPM | RESPIRATION RATE: 16 BRPM | DIASTOLIC BLOOD PRESSURE: 76 MMHG

## 2023-11-14 PROCEDURE — 85025 COMPLETE CBC W/AUTO DIFF WBC: CPT

## 2023-11-14 PROCEDURE — 36415 COLL VENOUS BLD VENIPUNCTURE: CPT

## 2023-11-14 PROCEDURE — 97163 PT EVAL HIGH COMPLEX 45 MIN: CPT

## 2023-11-14 PROCEDURE — 92507 TX SP LANG VOICE COMM INDIV: CPT

## 2023-11-14 PROCEDURE — 80053 COMPREHEN METABOLIC PANEL: CPT

## 2023-11-14 PROCEDURE — 97530 THERAPEUTIC ACTIVITIES: CPT

## 2023-11-14 PROCEDURE — 97116 GAIT TRAINING THERAPY: CPT

## 2023-11-14 PROCEDURE — 97167 OT EVAL HIGH COMPLEX 60 MIN: CPT

## 2023-11-14 PROCEDURE — 97110 THERAPEUTIC EXERCISES: CPT

## 2023-11-14 PROCEDURE — 92523 SPEECH SOUND LANG COMPREHEN: CPT

## 2023-11-14 PROCEDURE — 85027 COMPLETE CBC AUTOMATED: CPT

## 2023-11-14 PROCEDURE — 99239 HOSP IP/OBS DSCHRG MGMT >30: CPT

## 2023-11-14 PROCEDURE — 92610 EVALUATE SWALLOWING FUNCTION: CPT

## 2023-11-14 PROCEDURE — 97112 NEUROMUSCULAR REEDUCATION: CPT

## 2023-11-14 PROCEDURE — 97535 SELF CARE MNGMENT TRAINING: CPT

## 2023-11-14 RX ADMIN — Medication 1 APPLICATION(S): at 05:21

## 2023-11-14 RX ADMIN — RIVASTIGMINE 1.5 MILLIGRAM(S): 4.6 PATCH, EXTENDED RELEASE TRANSDERMAL at 05:21

## 2023-11-14 RX ADMIN — CARBIDOPA AND LEVODOPA 3 CAPSULE(S): 25; 100 TABLET ORAL at 12:17

## 2023-11-14 RX ADMIN — LIDOCAINE 2 PATCH: 4 CREAM TOPICAL at 05:19

## 2023-11-14 RX ADMIN — ESCITALOPRAM OXALATE 20 MILLIGRAM(S): 10 TABLET, FILM COATED ORAL at 12:17

## 2023-11-14 RX ADMIN — CARBIDOPA AND LEVODOPA 3 CAPSULE(S): 25; 100 TABLET ORAL at 08:15

## 2023-11-14 RX ADMIN — FAMOTIDINE 20 MILLIGRAM(S): 10 INJECTION INTRAVENOUS at 12:17

## 2023-11-14 RX ADMIN — GABAPENTIN 100 MILLIGRAM(S): 400 CAPSULE ORAL at 12:19

## 2023-11-14 RX ADMIN — Medication 1 APPLICATION(S): at 12:17

## 2023-11-14 RX ADMIN — ROTIGOTINE 1 PATCH: 8 PATCH, EXTENDED RELEASE TRANSDERMAL at 08:16

## 2023-11-14 RX ADMIN — POLYETHYLENE GLYCOL 3350 17 GRAM(S): 17 POWDER, FOR SOLUTION ORAL at 05:21

## 2023-11-14 RX ADMIN — Medication 1 APPLICATION(S): at 05:20

## 2023-11-14 NOTE — PROGRESS NOTE ADULT - REASON FOR ADMISSION
Parkinson's Disease s/p DBS

## 2023-11-14 NOTE — PROGRESS NOTE ADULT - PROVIDER SPECIALTY LIST ADULT
Neuro Rehabilitation
Neurology
Neurology
Hospitalist
Neuro Rehabilitation
Neuro Rehabilitation
Neurology
Physiatry
Physiatry
Hospitalist
Neuro Rehabilitation
Neuro Rehabilitation
Rehab Medicine
Rehab Medicine
Hospitalist
Neuropsychology

## 2023-11-14 NOTE — PROGRESS NOTE ADULT - SUBJECTIVE AND OBJECTIVE BOX
SUBJECTIVE/ROS: Patient evaluated while in the bed. He denies chest pain, fever, chills, nausea, vomiting, abdominal pain, headache, or BLE pain. He is medically stable and ready for discharge home. All discharge instructions reviewed and questions answered.     HPI:  Patient is a 64yo M with history of severe Parkinson's disease (dx in 2015), HTN, anxiety, who presented to [facility] 10/25 for DBS, now s/p Stage 1 and Stage 2 DBS.He was diagnosed with Parkinson's disease in 2015 when he was having dragging of left leg, and difficulty moving. He was started on levodopa and it worked well for a few years. He now has B/L stiffness, and slowness, but no tremor. He is also experiencing neck discomfort and tilting of his neck forward. He has severe on/off motor fluctuations. He has sudden severe wearing offs of medications, and effects of medication lasts 1-2 hours then he has severe stiffness and slowness. He moves his neck better when he takes the medication. He experiencing freezing of gait, and he can't speak or think at those times. He walks with walker most of the time, and he can usually do his own bathing/dressing/toileting/feeding most days, but sometimes needs help. He is s/p Stage 1 Deep Brain Surgery Bilateral Implantation Into Globus Pallidus Internus With Leksell Frame on 10/25/23 and plan for Stage 2 on 10/30/23. He tolerated procedure well. He was evaluated for admission to acute inpatient rehab and admitted to [facility] on 10/27/23. Subsequently transferred to [facility] for Stage 2 DBS 10/30. Post-operatively, he became hypoxic and required monitoring overnight due to bronchospasm. Patient was evaluated by PM&R and therapy for functional deficits and gait/ ADL impairments and recommended acute rehabilitation. Patient was medically optimized for discharge to [facility] on 10/31.        Vital Signs Last 24 Hrs  T(C): 36.7 (14 Nov 2023 08:20), Max: 36.7 (13 Nov 2023 20:05)  T(F): 98 (14 Nov 2023 08:20), Max: 98.1 (13 Nov 2023 20:05)  HR: 62 (14 Nov 2023 08:20) (62 - 66)  BP: 142/76 (14 Nov 2023 08:20) (142/76 - 151/80)  BP(mean): --  RR: 16 (14 Nov 2023 08:20) (16 - 16)  SpO2: 96% (14 Nov 2023 08:20) (95% - 96%)    Parameters below as of 14 Nov 2023 08:20  Patient On (Oxygen Delivery Method): room air            PHYSICAL EXAM  Constitutional - NAD, Comfortable  HEENT - NCAT, EOMI  Neck - Supple, No limited ROM  Chest - Breathing comfortably   Cardiovascular - RRR, S1S2  Abdomen -BS+, Soft, NTND  Extremities - No C/C/E, No calf tenderness   Neurologic Exam -aox2-3, lopez 5/5, hypophonic, bradykinetic         LABS:                          12.5   4.31  )-----------( 247      ( 13 Nov 2023 06:00 )             39.6     11-13    139  |  104  |  14  ----------------------------<  112<H>  3.3<L>   |  26  |  0.59    Ca    8.3<L>      13 Nov 2023 06:00    TPro  5.8<L>  /  Alb  2.8<L>  /  TBili  0.3  /  DBili  x   /  AST  17  /  ALT  8<L>  /  AlkPhos  73  11-13    LIVER FUNCTIONS - ( 13 Nov 2023 06:00 )  Alb: 2.8 g/dL / Pro: 5.8 g/dL / ALK PHOS: 73 U/L / ALT: 8 U/L / AST: 17 U/L / GGT: x                 MEDICATIONS  (STANDING):  carbidopa 36.25 mG/levodopa 145 mG ER 3 Capsule(s) Oral <User Schedule>  clonazePAM Oral Disintegrating Tablet 0.25 milliGRAM(s) Oral at bedtime  erythromycin   Ointment 1 Application(s) Both EYES four times a day  escitalopram 20 milliGRAM(s) Oral daily  famotidine    Tablet 20 milliGRAM(s) Oral daily  gabapentin 100 milliGRAM(s) Oral daily  gabapentin 100 milliGRAM(s) Oral at bedtime  lidocaine   4% Patch 2 Patch Transdermal <User Schedule>  melatonin 3 milliGRAM(s) Oral at bedtime  mirtazapine 7.5 milliGRAM(s) Oral at bedtime  polyethylene glycol 3350 17 Gram(s) Oral two times a day  rivastigmine 1.5 milliGRAM(s) Oral two times a day  rotigotine patch 3 mG/24 Hr(s) 1 Patch Transdermal <User Schedule>  senna 2 Tablet(s) Oral at bedtime    MEDICATIONS  (PRN):  acetaminophen     Tablet .. 650 milliGRAM(s) Oral every 6 hours PRN Mild Pain (1 - 3)  bisacodyl 5 milliGRAM(s) Oral at bedtime PRN Constipation  clonazePAM  Tablet 0.5 milliGRAM(s) Oral two times a day PRN for anxiety

## 2023-11-14 NOTE — PROGRESS NOTE ADULT - NS ATTEND AMEND GEN_ALL_CORE FT
Patient is being discharged home with home care  today.  Discharge instructions were discussed with patient and wife, all current medications were sent to the pharmacy. Patient and wife were educated on importance of medication compliance,  continued  care with PMD and follow-up care with the specialists in the community. Safety and fall risk precautions  were discussed in detail, counseled on healthy life style modifications.  All questions were answered to their satisfaction.    45 min spent

## 2023-11-14 NOTE — PROGRESS NOTE ADULT - ASSESSMENT
Patient is a 62yo M with history of severe Parkinson's disease (dx in 2015), HTN, anxiety, who presented to [facility] 10/25 for DBS, now s/p Stage 1 and Stage 2 DBS. Hospital course complicated by brochospasm requiring overnight observation postoperatively, also now with dysphagia. Admitted to [facility] inpatient [facility] on 10/31 for ADL, gait, and functional impairments.     #Parkinson's Disease now with gait Instability, ADL impairments and Functional impairments    Parkinson's related motor symptoms    #Rigidity/Bradykinesia/wearing off  -S/p Stage 1 Deep Brain Surgery Bilateral Implantation Into Globus Pallidus Internus With Leksell Frame on 10/25/23    -S/p Stage 2 on 10/30/23 - monitored overnight for hypoxia 2/2 bronchospasm  -Continue Rytary 145mg 3 tabs at 8a, 12pm, 4pm and 8pm  -Continue Neupro patch 2mg /24hours daily    Parkinson's related non-motor symptoms    #Mood/anxiety  -Continue Lexapro 20mg daily  -Melatonin 3mg at bedtime  -Continue clonazepam 0.25mg at bedtime and PRN    #Pain control  - Tylenol PRN  - Lidocaine patches to b/l knees  -Continue gabapentin 100mg BID    #GI/Bowel Management/Constipation  - Continue Senna at bedtime   - Miralax BID    - Bisacodyl PRN  - Pepcid 20mg daily    Concurrent medical problems    #Skin:  -3 surgical sites on top of skull. Clean dry and intact. Staples present. No fluctuance, erythema, drainage, or oozing at time of exam.    FEN   - Diet - Regular with thins [CCHO, DASH/TLC]      Precautions / PROPHYLAXIS:   - Falls  - ortho: Weight bearing status: WBAT   - Lungs: Aspiration, Incentive Spirometer   - Pressure injury/Skin: Turn Q2hrs while in bed, OOB to Chair, PT/OT      TEAM MEETING 11/13/23  SW:  Family training provided  OT: min for adl and transfers  PT: svt for transfer, amb 150' with rollator  SLP: mod cog deficits   barriers: dec cog, dec balance,dec strength   EDOD: Home  11/14/23    MEDICALLY STABLE AND READY FOR DISCHARGE HOME WITH HOME CARE.

## 2023-11-15 ENCOUNTER — APPOINTMENT (OUTPATIENT)
Dept: NEUROLOGY | Facility: CLINIC | Age: 63
End: 2023-11-15
Payer: MEDICARE

## 2023-11-15 VITALS
DIASTOLIC BLOOD PRESSURE: 80 MMHG | HEIGHT: 74 IN | BODY MASS INDEX: 27.59 KG/M2 | SYSTOLIC BLOOD PRESSURE: 129 MMHG | HEART RATE: 69 BPM | WEIGHT: 215 LBS

## 2023-11-15 PROCEDURE — 99214 OFFICE O/P EST MOD 30 MIN: CPT

## 2023-11-15 PROCEDURE — 95983 ALYS BRN NPGT PRGRMG 15 MIN: CPT

## 2023-11-17 ENCOUNTER — APPOINTMENT (OUTPATIENT)
Dept: NEUROSURGERY | Facility: CLINIC | Age: 63
End: 2023-11-17
Payer: MEDICARE

## 2023-11-17 VITALS
OXYGEN SATURATION: 98 % | HEART RATE: 70 BPM | HEIGHT: 74 IN | DIASTOLIC BLOOD PRESSURE: 79 MMHG | SYSTOLIC BLOOD PRESSURE: 137 MMHG

## 2023-11-17 PROCEDURE — 99024 POSTOP FOLLOW-UP VISIT: CPT

## 2023-11-17 PROCEDURE — 95970 ALYS NPGT W/O PRGRMG: CPT

## 2023-11-21 ENCOUNTER — APPOINTMENT (OUTPATIENT)
Dept: NEUROLOGY | Facility: CLINIC | Age: 63
End: 2023-11-21

## 2023-12-07 NOTE — PHYSICAL THERAPY INITIAL EVALUATION ADULT - BALANCE TRAINING, PT EVAL
Recommend sending letter of appeal.   Goal: Pt will improve in balance by 1/2 grade to improve in ambulation and ADLs in 3 weeks.

## 2023-12-19 ENCOUNTER — APPOINTMENT (OUTPATIENT)
Dept: NEUROSURGERY | Facility: CLINIC | Age: 63
End: 2023-12-19
Payer: MEDICARE

## 2023-12-19 VITALS
TEMPERATURE: 96.9 F | WEIGHT: 190 LBS | BODY MASS INDEX: 24.38 KG/M2 | HEIGHT: 74 IN | DIASTOLIC BLOOD PRESSURE: 83 MMHG | SYSTOLIC BLOOD PRESSURE: 134 MMHG | HEART RATE: 82 BPM | OXYGEN SATURATION: 96 %

## 2023-12-19 PROCEDURE — 99024 POSTOP FOLLOW-UP VISIT: CPT

## 2023-12-19 NOTE — PHYSICAL EXAM
[General Appearance - Alert] : alert [General Appearance - In No Acute Distress] : in no acute distress [Clean] : clean [Dry] : dry [Intact] : intact [Staple Intact] : closed with intact staples [No Drainage] : without drainage [Normal Skin] : normal [Oriented To Time, Place, And Person] : oriented to person, place, and time [Affect] : the affect was normal [Mood] : the mood was normal [Person] : oriented to person [Place] : oriented to place [Time] : oriented to time [Cranial Nerves Oculomotor (III)] : extraocular motion intact [Cranial Nerves Trigeminal (V)] : facial sensation intact symmetrically [Cranial Nerves Facial (VII)] : face symmetrical [Cranial Nerves Vestibulocochlear (VIII)] : hearing was intact bilaterally [Cranial Nerves Accessory (XI - Cranial And Spinal)] : head turning and shoulder shrug symmetric [Cranial Nerves Hypoglossal (XII)] : there was no tongue deviation with protrusion [Motor Strength] : muscle strength was normal in all four extremities [Motor Handedness Right-Handed] : the patient is right hand dominant [Sensation Tactile Decrease] : light touch was intact [] : no respiratory distress [Respiration, Rhythm And Depth] : normal respiratory rhythm and effort [Skin Color & Pigmentation] : normal skin color and pigmentation [Erythema] : not erythematous [Tender] : not tender [Warm] : not warm [Indurated] : not indurated [Tremor] : no tremor present [FreeTextEntry1] : neck flexion and stiffness

## 2023-12-19 NOTE — HISTORY OF PRESENT ILLNESS
[> 3 months] : more  than 3 months [FreeTextEntry1] : Parkinson's disease [de-identified] : 11/17/2023: EJ ROJAS is a 63 year old right handed male with PMH of severe Parkinson's disease, HTN, anxiety. No anticoagulants or antiplatelets.  He presents for neurosurgical consultation for DBS. He is under the care of movement disorder neurologist Dr. Gil. He was diagnosed with PD in 2015 when he was having dragging of left leg, and difficulty moving. He was started on levodopa and it worked well and he was able to work for additional 2 years. He now has b/l stiffness, and slowness, but no tremor. Also experiencing neck discomfort and tilting of his neck forward. He has severe on/off motor fluctuations. Denies dyskinesias. He has sudden severe wearing offs of medications. He has wearing offs daily. Effect of medication lasts 1-2 hours then he has severe stiffness and slowness. He moves neck better when he takes the medication. He experiencing freezing of gait, and he can't speak or think at those times. Denies hallucinations, urinary frequency, constipation. Has occasional mood fluctuations, dysphagia with some foods, medication, and liquids during off periods, drooling, Mostly independently with dressing, feeding, but sometimes needs help. He states his balance is mostly good, he stumbles. He has been using a Rollator for years. He completed CAPSIT 4/18/23. He had neuropsychological evaluation 3/6/23. He underwent stage 1 left GPi-DBS with left frontal craniotomy for DBS electrode placement on 10/25/23. He underwent stage 2 DBS with lead extension and pulse generator placement 10/30/23. He was transferred to [facility] post op.  Today he presents for initial post op visit for assessment of surgical incisions and staple removal. He completed post op antibiotics. Denies fevers, chills, bleeding, or drainage from surgical incisions. Reports his med regimen has changed a little, and he has noted improvement in Parkinson's symptoms since surgery and programming. He had recent neurology follow up with Dr. Gil.  Current Parkinson's medication: Rytary 145 mg 3 capsules QID (8-12-4-9) Neupro patch 2mg daily

## 2023-12-19 NOTE — REASON FOR VISIT
[Spouse] : spouse [de-identified] : DBS implanted [de-identified] : 10/25/23 [de-identified] : 7 [de-identified] : This is a 63 -year-old righthanded male who presents today accompanied by his wife. He is walking with a walker. He remains with the head projected forward. His wife states that she feels that he is worse in some ways since having the DBS. She states that he falls more frequently now.  She states that after the implant he went to [facility] for Parkinsons and since coming home he has been receiving both speech and physical therapy.

## 2023-12-19 NOTE — ASSESSMENT
[FreeTextEntry1] : IMPRESSION: This is a 63 -year-old righthanded male who presents today accompanied by his wife. He is walking with a walker. He remains with the head projected forward. His wife states that she feels that he is worse in some ways since having the DBS. She states that he falls more frequently now.  She states that after the implant he went to [facility] for Parkinsons and since coming home he has been receiving both speech and physical therapy.  Discussion:  A detailed discussion occurred with the patient and his wife regarding the post implant course. They were encouraged to follow up with Dr. Gil for programming and medication adjustment.  Also, there was further discussion regarding surgical implant on the second side. For this there is a 3 months neuropsych evaluation again. This needs to be scheduled for January.  Once all has been cleared then we can proceed with [id number] the second side.   PLAN: -Return to office after the neuropsych evaluation to plan for side two implantation -Continue to f/u with neurology for medication/stimulation management for PD (Appointment scheduled for 12/21) -Schedule an appointment for January for the second neuropsych evaluation

## 2023-12-21 ENCOUNTER — APPOINTMENT (OUTPATIENT)
Dept: NEUROLOGY | Facility: CLINIC | Age: 63
End: 2023-12-21
Payer: MEDICARE

## 2023-12-21 VITALS
HEIGHT: 74 IN | HEART RATE: 75 BPM | BODY MASS INDEX: 24.38 KG/M2 | WEIGHT: 190 LBS | SYSTOLIC BLOOD PRESSURE: 134 MMHG | DIASTOLIC BLOOD PRESSURE: 82 MMHG

## 2023-12-21 PROCEDURE — 95983 ALYS BRN NPGT PRGRMG 15 MIN: CPT

## 2023-12-21 PROCEDURE — 99214 OFFICE O/P EST MOD 30 MIN: CPT

## 2023-12-21 NOTE — PROCEDURE
[FreeTextEntry1] : Left GPI-[employer]tr[employer] Rep Hero present Implanted October 2023 Case positive, 1 negative Amplitude 2-->2.2 Pulse width 60 Frequency 125 Total time spent programming 10 minutes

## 2023-12-21 NOTE — DISCUSSION/SUMMARY
[FreeTextEntry1] : Brian Mitchell is a 63-year-old right-handed male with a diagnosis of Parkinson's disease since 2015.  On exam he is noted to have bilateral bradykinesia left worse than right.  Patient was reporting frequent off periods, in spite of taking medications every 4 hours He also endorses significant anxiety for which he is following up with psychiatry, Dr. Lincoln- MRI of the brain chronic microvascular changes Stalevo and Ongentys was not covered by their insurance In the past Azilect discontinued as he is on SSRI Levodopa challenge with good improvement in motor symptoms off score 49 on score 21 Neuropsych evaluation-in conclusion impairment across and within most aspects of cognitive processing, accompanied by changes in activities of daily living can be best described as mild to moderate dementia.  Given history and presentation Parkinson's diseases primary etiological consideration.  However given the diffuse nature of his cognitive difficulties with evidence of rapid forgetting the presence of another neurodegenerative disorder is also possible.  That said there was a likely significant contribution of mood during the exam resulting in fluctuating engagement.  Thus certain scores may be under representation of his functioning. Present they deny cognitive changes, rare hallucinations-MMSE 20 out of 30, neuropsych testing mild to moderate dementia but also fluctuating effort due to mood during exam Patient underwent left GPI DBS in October 2023.  He is also status post 2 weeks of rehab at [facility].  Overall he and his wife are happy with the results.  He continues to be on Rytary dose unchanged and on Neupro patch 2 mg ( lower than before)Rivastigmine 1.5bid  Impression- parkinsonism most likely idiopathic Parkinson's disease, anxiety, status post left GPI DBS  Plan DBS programmed as above, patient and his wife taught how to use the patient  -Continue Rytary 145 3 capsules 4 times a day -continue Neupro patch  2 mg daily.   -continue rivastigmine 1.5 bid - they will discuss mirtazapinewith psychiatrist(wife dosent feel he needs it and in past psychiatrist had stopped it) Follow-up with psychiatry for better management of anxiety/depression, he will also discuss use of gabapentin with them, currently takes it twice a day Follow-up in 6-8 weeks repeat neuropsych eval, they are  interested in getting R GPi All questions were addressed and answered
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
## 2023-12-21 NOTE — PHYSICAL EXAM
[FreeTextEntry1] : On exam patient is awake and alert.  He is pleasant cooperative with the exam. Face is symmetric tongue and uvula midline and symmetric.  Speech is soft but easy to understand, drooling Extra ocular movements are intact No resting action or postural tremors are seen Bilateral bradykinesia Hand opening and closing 1 on the right 2 on the left Rapid alternating movements 1 on the right 2 on the left Finger taps 1 bilaterally left slightly slower Leg agility 1 bilaterally No difficulty getting up from the chair Posture is stooped and scoliotic Good speed and stride of walking.  No freezing noticed. Strength 5/5 plantars are downgoing  Surgical site is clean   MMSE 20 out of 30 -March 2023 Patient not aware of the exact date, unable to do serial sevens got 1 out of 5, recall 1 out of 3.  Had difficulty drawing the design writing a sentence and repeating

## 2024-01-09 ENCOUNTER — APPOINTMENT (OUTPATIENT)
Dept: INTERNAL MEDICINE | Facility: CLINIC | Age: 64
End: 2024-01-09
Payer: MEDICARE

## 2024-01-09 VITALS
OXYGEN SATURATION: 96 % | DIASTOLIC BLOOD PRESSURE: 84 MMHG | TEMPERATURE: 98.1 F | SYSTOLIC BLOOD PRESSURE: 131 MMHG | HEART RATE: 68 BPM | BODY MASS INDEX: 26.19 KG/M2 | WEIGHT: 204 LBS

## 2024-01-09 DIAGNOSIS — R73.9 HYPERGLYCEMIA, UNSPECIFIED: ICD-10-CM

## 2024-01-09 DIAGNOSIS — H10.30 UNSPECIFIED ACUTE CONJUNCTIVITIS, UNSPECIFIED EYE: ICD-10-CM

## 2024-01-09 LAB — HBA1C MFR BLD HPLC: 6.1

## 2024-01-09 PROCEDURE — G2211 COMPLEX E/M VISIT ADD ON: CPT

## 2024-01-09 PROCEDURE — 83036 HEMOGLOBIN GLYCOSYLATED A1C: CPT | Mod: QW

## 2024-01-09 PROCEDURE — 99214 OFFICE O/P EST MOD 30 MIN: CPT

## 2024-01-09 RX ORDER — CIPROFLOXACIN 3 MG/ML
0.3 SOLUTION OPHTHALMIC
Qty: 1 | Refills: 0 | Status: ACTIVE | COMMUNITY
Start: 2024-01-09 | End: 1900-01-01

## 2024-01-09 RX ORDER — GABAPENTIN 100 MG/1
100 CAPSULE ORAL
Refills: 0 | Status: DISCONTINUED | COMMUNITY
End: 2024-01-09

## 2024-02-06 ENCOUNTER — APPOINTMENT (OUTPATIENT)
Dept: NEUROLOGY | Facility: CLINIC | Age: 64
End: 2024-02-06
Payer: MEDICARE

## 2024-02-06 PROCEDURE — 96116 NUBHVL XM PHYS/QHP 1ST HR: CPT

## 2024-02-06 PROCEDURE — 96132 NRPSYC TST EVAL PHYS/QHP 1ST: CPT

## 2024-02-06 PROCEDURE — 96136 PSYCL/NRPSYC TST PHY/QHP 1ST: CPT

## 2024-02-06 PROCEDURE — 96133 NRPSYC TST EVAL PHYS/QHP EA: CPT

## 2024-02-06 PROCEDURE — 96137 PSYCL/NRPSYC TST PHY/QHP EA: CPT

## 2024-02-13 ENCOUNTER — APPOINTMENT (OUTPATIENT)
Dept: NEUROSURGERY | Facility: CLINIC | Age: 64
End: 2024-02-13
Payer: MEDICARE

## 2024-02-13 ENCOUNTER — APPOINTMENT (OUTPATIENT)
Dept: NEUROSURGERY | Facility: CLINIC | Age: 64
End: 2024-02-13

## 2024-02-13 DIAGNOSIS — Z96.89 PRESENCE OF OTHER SPECIFIED FUNCTIONAL IMPLANTS: ICD-10-CM

## 2024-02-13 PROCEDURE — 99213 OFFICE O/P EST LOW 20 MIN: CPT

## 2024-02-13 NOTE — REASON FOR VISIT
[Follow-Up: _____] : a [unfilled] follow-up visit [Home] : at home, [unfilled] , at the time of the visit. [Medical Office: ([facility])___] : at the medical office located in  [Spouse] : spouse [Patient] : the patient [Self] : self

## 2024-02-16 ENCOUNTER — APPOINTMENT (OUTPATIENT)
Dept: NEUROLOGY | Facility: CLINIC | Age: 64
End: 2024-02-16
Payer: MEDICARE

## 2024-02-16 PROCEDURE — 96133 NRPSYC TST EVAL PHYS/QHP EA: CPT | Mod: 95

## 2024-02-20 PROBLEM — Z96.89 STATUS POST DEEP BRAIN STIMULATOR PLACEMENT: Status: ACTIVE | Noted: 2023-11-15

## 2024-02-20 NOTE — ASSESSMENT
[FreeTextEntry1] : IMPRESSION: This is a 63 -year-old righthanded male who presents today accompanied by his wife. He is walking with a walker. He remains with the head projected forward. His wife states that she feels that he is worse in some ways since having the DBS. She states that he falls more frequently now.  She states that after the implant he went to [facility] and since coming home he has been receiving both speech and physical therapy.  Discussion: Answered any questions regarding DBS and second side implant. Pending results of follow up neuropsych eval and neurology eval, can proceed with implanting second side. Discussed procedure, risks, and benefits. Pt and spouse verbalized understanding. Once all has been cleared then we can proceed with implanting the second side.   PLAN: -Surgical coordination team will contact once neuropsych report finalized. -Continue to f/u with neurology for medication/stimulation management for PD  I, Dr. Addison Garcia, personally performed the evaluation and management (E/M) services for this established patient who presents today with (a) new problem(s)/exacerbation of (an) existing condition(s).  That E/M includes conducting the clinically appropriate interval history &/or exam, assessing all new/exacerbated conditions, and establishing a new plan of care.  Today, my YAMILEX, Eneida Marshall, was here to observe my evaluation and management service for this new problem/exacerbated condition and follow the plan of care established by me going forward.

## 2024-02-20 NOTE — HISTORY OF PRESENT ILLNESS
[> 3 months] : more  than 3 months [FreeTextEntry1] : Parkinson's disease [de-identified] : 11/17/2023: EJ ROJAS is a 63 year old right handed male with PMH of severe Parkinson's disease, HTN, anxiety. No anticoagulants or antiplatelets.  He presents for neurosurgical consultation for DBS. He is under the care of movement disorder neurologist Dr. Gil. He was diagnosed with PD in 2015 when he was having dragging of left leg, and difficulty moving. He was started on levodopa and it worked well and he was able to work for additional 2 years. He now has b/l stiffness, and slowness, but no tremor. Also experiencing neck discomfort and tilting of his neck forward. He has severe on/off motor fluctuations. Denies dyskinesias. He has sudden severe wearing offs of medications. He has wearing offs daily. Effect of medication lasts 1-2 hours then he has severe stiffness and slowness. He moves neck better when he takes the medication. He experiencing freezing of gait, and he can't speak or think at those times. Denies hallucinations, urinary frequency, constipation. Has occasional mood fluctuations, dysphagia with some foods, medication, and liquids during off periods, drooling, Mostly independently with dressing, feeding, but sometimes needs help. He states his balance is mostly good, he stumbles. He has been using a Rollator for years. He completed CAPSIT 4/18/23. He had neuropsychological evaluation 3/6/23. He underwent stage 1 left GPi-DBS with left frontal craniotomy for DBS electrode placement on 10/25/23. He underwent stage 2 DBS with lead extension and pulse generator placement 10/30/23. He was transferred to [facility] post op.  He reports he doesn't feel improvement from stimulator. He experiences freezing episodes that can last an hour-wife has to get him into chair so he doesn't fall. He fell 4x this past Sunday. His mobility is severely impaired and wife is considering bringing wheelchair up to use. His last programming was about a month ago.  Wife states his speech improved for a little while. They had unilateral implant and would like to discuss right sided implant. Had repeat neuropsych evaluation by  last week. He is meeting with Dr. Pickering 3/1/24.   Current Parkinson's medication: Rytary 145 mg 3 capsules QID (8-12-4-9) Neupro patch 2mg daily  Rivastigmine 1.5 mg daily

## 2024-02-27 ENCOUNTER — APPOINTMENT (OUTPATIENT)
Dept: NEUROSURGERY | Facility: CLINIC | Age: 64
End: 2024-02-27

## 2024-03-01 ENCOUNTER — APPOINTMENT (OUTPATIENT)
Dept: NEUROLOGY | Facility: CLINIC | Age: 64
End: 2024-03-01
Payer: MEDICARE

## 2024-03-01 VITALS
HEIGHT: 74 IN | DIASTOLIC BLOOD PRESSURE: 84 MMHG | WEIGHT: 204 LBS | HEART RATE: 80 BPM | SYSTOLIC BLOOD PRESSURE: 133 MMHG | BODY MASS INDEX: 26.18 KG/M2

## 2024-03-01 PROCEDURE — 95983 ALYS BRN NPGT PRGRMG 15 MIN: CPT

## 2024-03-01 PROCEDURE — 99215 OFFICE O/P EST HI 40 MIN: CPT | Mod: 25

## 2024-03-01 RX ORDER — ROTIGOTINE 2 MG/24H
2 PATCH, EXTENDED RELEASE TRANSDERMAL DAILY
Qty: 90 | Refills: 3 | Status: DISCONTINUED | COMMUNITY
Start: 2021-08-19 | End: 2024-03-01

## 2024-03-04 NOTE — DISCUSSION/SUMMARY
[FreeTextEntry1] : This is a 63-year-old male with a 10-year history of Parkinson's disease who has underlying cognitive impairment and underwent left GPI implant 4 months ago.  He reportedly has not had much benefit from the stimulation.  However, patient nor his wife is able to quantify how much off periods he experiences during the day.  Their expectations as well seem to not have been appropriate for the surgery.  Therefore, I reviewed the outcome of our multidisciplinary DBS meeting regarding his candidacy for surgery.  It was decided that he should have a unilateral GPI DBS due to his significant cognitive impairment and thus elevated risk for postop complications (prolonged hospitalization, delirium, aspiration PNA, etc). The goal was to reduce bothersome fluctuations.   The GPI was chosen given its lower risk in the literature of exacerbating cognitive problems.  Additionally I explained to his wife that the goal of DBS in this context was not to reduce his medication burden as he needs levodopa in order to walk.  Deep brain stimulation does not replace levodopa with respect to gait disorder  and freezing of gait.    Furthermore, the patient did have postop delirium and repeat neuropsych testing recently shows a decline across all domains. Patient's wife expressed good understanding of the process undertaken to vet patient for DBS. Patient conveyed that he does not want to undergo surgery again at this time based on our conversation.  I agree with the patient's decision and would not recommend additional DBS surgery. The goals for him are to focus on physical re-conditioning, cognitive therapies, and motor/non-motor sxm management with uliateral DBS and pharmacological therapy.   I recommended the following changes to his regimen:  d/c neupro increase rytary 145 to 3 tabs 5 q3hrs ( 5doses/d) Cont PT Wife instructed to do Wilman Diary and also enter DBS events of OFF/ON states  to assist us in quantifying off periods. Plan to treat with BTX for sialorrhea in 4-6 weeks

## 2024-03-04 NOTE — PROCEDURE
[FreeTextEntry1] : Left GPI-[employer] Battery:  Current: Case positive, 1 negative 2.2mA/60/125 Notes: Local field potentials reviewed.  There is fairly good suppression on the timeline with occasional episodes of peaks. Final:2.5/60/125  Total time spent programming: 10mins

## 2024-03-04 NOTE — HISTORY OF PRESENT ILLNESS
[FreeTextEntry1] : 63-year-old male with PD since 2015 s/p L GPI DBS in October 2023 presents for second opinion.  Patient has been followed by Dr. Gil.   He was deemed to be a candidate for deep brain stimulation due to his motor fluctuations.  His neuropsych at that time demonstrated mild to moderate dementia and a decision was made to do a unilateral implant and to see if his fluctuations respond as patient and his wife were seeking some level of reduction in levodopa motor complication sto improve his QOL during his work up period. He had a 49% L-dopa response on his CAPSIT.    Patient had significant postoperative delirium and was managed at [facility].  His wife feels that he has not improved with deep brain stimulation as he continues to have difficulty with ambulation.  The patient nor his wife is able to quantify how much OFF time he has during the day currently; however he appears to be able to walk using a walker most of the time. He does not experience dyskinesia or tremor.  His Rytary regimen has remained the same but Neupro was reduced to 2 mg with stimulation.  He was also started on rivastigmine for his cognitive deficits.  He denies LID or tremors at this time.  He needs assistance with all ADLs. Started using a rollator 3-4 years ago.  Repeat neuropsychological assessment shows global cognitive decline.  A recent Mini-Mental done by Dr. Gil resulted in a score of 20.  NPT 2/16/24 o Global impairment across attention, executive, visuospatial, language, and memory functioning. o Compared to his previous evaluation in 2023, there has been declined across all domains. o Some areas are stable but impaired: Language - auditory comprehension with the exception of comprehension for single words (which remains intact), naming, semantic- and phonemic-based rapid word retrieval o Repetition of simple sentences remains intact, though he is no longer able to repeat complex sentences accurately.   Non-motor symptoms: +urinary urgency +confusion episodes a few times per weeks. Denies hallucinations.  -Severe sialorrhea and difficulty swallowing especially during off time  Current medications Rytary 145mg 3 capsules QID 8-12-4-8 Neupro 2mg  Rivastigmine 1.5tabs BID

## 2024-03-04 NOTE — PHYSICAL EXAM
[Person] : oriented to person [Place] : oriented to place [Cranial Nerves Oculomotor (III)] : extraocular motion intact [Cranial Nerves Facial (VII)] : face symmetrical [Motor Strength] : muscle strength was normal in all four extremities [Time] : disoriented to time [FreeTextEntry1] : There is 3+ facial masking.  There is moderate bradyphrenia.  Extraocular movements are intact.  There is severe sialorrhea.  He has mild to moderate anterior shift of his head.  A tremulous.  There is 2+ rapid alternating movements in his upper extremity and 3+ in the lower extremity.  There is trace rigidity in his limbs.  He pushes to stand and able to walk with a rollator.  He turns en bloc.  There was no freezing of gait.  Posture was stooped.  When stimulator was turned off he was able to walk.

## 2024-03-27 RX ORDER — LEVODOPA AND CARBIDOPA 145; 36.25 MG/1; MG/1
36.25-145 CAPSULE, EXTENDED RELEASE ORAL
Qty: 1350 | Refills: 3 | Status: ACTIVE | COMMUNITY
Start: 2022-04-25 | End: 1900-01-01

## 2024-04-05 ENCOUNTER — APPOINTMENT (OUTPATIENT)
Dept: NEUROLOGY | Facility: CLINIC | Age: 64
End: 2024-04-05
Payer: MEDICARE

## 2024-04-05 VITALS
DIASTOLIC BLOOD PRESSURE: 74 MMHG | BODY MASS INDEX: 25.67 KG/M2 | SYSTOLIC BLOOD PRESSURE: 139 MMHG | HEIGHT: 74 IN | WEIGHT: 200 LBS | HEART RATE: 86 BPM

## 2024-04-05 DIAGNOSIS — R13.10 DYSPHAGIA, UNSPECIFIED: ICD-10-CM

## 2024-04-05 PROCEDURE — 99215 OFFICE O/P EST HI 40 MIN: CPT

## 2024-04-05 PROCEDURE — G2211 COMPLEX E/M VISIT ADD ON: CPT

## 2024-04-05 RX ORDER — CARBIDOPA AND LEVODOPA 25; 100 MG/1; MG/1
25-100 TABLET ORAL
Qty: 450 | Refills: 3 | Status: ACTIVE | COMMUNITY
Start: 2024-04-05 | End: 1900-01-01

## 2024-04-05 NOTE — PROCEDURE
[FreeTextEntry1] : Left GPI-[employer]tronic Battery:95  Current: Case positive, 1 negative 2.5mA/60/125 Notes: 2.8mA improved right side bradykinesia Final:2.8/60/125  Total time spent programming: 10mins

## 2024-04-05 NOTE — HISTORY OF PRESENT ILLNESS
[FreeTextEntry1] : 63-year-old male with PD since 2015 s/p L GPI DBS in October 2023 presents for second opinion.  Patient has been followed by Dr. Gil.  Patient experiences motor fluctuations with Rytary.  He was deemed to be a candidate for deep brain stimulation due to his fluctuations.  His neuropsych at that time demonstrated mild to moderate dementia and a decision was made to do a unilateral implant and to see if his fluctuations response.  Patient had significant postoperative delirium and was [facility].  His wife feels that he has not improved with deep brain stimulation as he continues to have difficulty with ambulation.  The patient nor his wife is able to quantify how much off.  He has during the day but he appears to be able to walk using a walker most of the time. He does not experience dyskinesia or tremor.  His Rytary regimen has remained the same but Neupro was reduced to 2 mg.  He was also started on rivastigmine for his cognitive deficits.  He denies LID or tremors.  He needs assistance with all ADLs. Started using a rollator 3-4 years ago.  Repeat neuropsychological assessment shows global cognitive decline.  A recent Mini-Mental done by Dr. Gil resulted in a score of 20.  NPT 2/16/24 o Global impairment across attention, executive, visuospatial, language, and memory functioning. o Compared to his previous evaluation in 2023, there has been declined across all domains. o Some areas are stable but impaired: Language - auditory comprehension with the exception of comprehension for single words (which remains intact), naming, semantic- and phonemic-based rapid word retrieval o Repetition of simple sentences remains intact, though he is no longer able to repeat complex sentences accurately.   Interim hx Patient is taking rytary 145 5 times per day with notable improvement in OFF periods. Wife says that he continues to have severe OFF during the week that can last several hrs. Review of LFP data shows intermittent spikes in activity.  Several days ago he could not get out of bed and needed EMS to assist with picking him up. AFter 2 doses of rytary he slowly improved.  Follows with psychiatrist Dr. Lincoln  Non-motor symptoms: sleep fragmented and does not take melatonin regulatrly +urinary urgency +confusion episodes a few times per weeks has not changed. Denies hallucinations.  -Severe sialorrhea and difficulty swallowing especially during off time - constipation  Current medications Rytary 145mg 3 capsules 8-11-2-5-8 Rivastigmine 1.5tabs BID melatonin  klonopin 0.5mg BID prn  lexapro 20mg qd  Prior neupro

## 2024-04-05 NOTE — DISCUSSION/SUMMARY
[FreeTextEntry1] : PDD s/p L Gpi with improving OFF periods but has periods of severe OFFs with immbobility Sialorrhea - refusing BTX at this time.  Sleep fragmentation Mood is stable Dysphagia  I recommended the following changes to his regimen:  DBS adjusted - take 2 tabs rytary 145 + 1 tab sinemet  5 times per day - take melatonin 3-5mg hs  - con klonopin/lexapro per psych - refer for MBS - plan for PT when [facility] gets approved  f/u 2months

## 2024-04-05 NOTE — PHYSICAL EXAM
[FreeTextEntry1] : There is 3+ facial masking. There is moderate bradyphrenia. Extraocular movements are intact. There is severe sialorrhea. He has mild to moderate anterior shift of his head. A tremulous. There is 2+ rapid alternating movements in his upper extremity and 2-3+ in the lower extremity. There is trace rigidity in his limbs. He pushes to stand and able to walk with a rollator. He turns en bloc. There was no freezing of gait. Posture was stooped.

## 2024-04-16 ENCOUNTER — APPOINTMENT (OUTPATIENT)
Dept: INTERNAL MEDICINE | Facility: CLINIC | Age: 64
End: 2024-04-16
Payer: MEDICARE

## 2024-04-16 VITALS
SYSTOLIC BLOOD PRESSURE: 113 MMHG | BODY MASS INDEX: 25.67 KG/M2 | TEMPERATURE: 98.1 F | DIASTOLIC BLOOD PRESSURE: 75 MMHG | WEIGHT: 200 LBS | HEART RATE: 69 BPM | OXYGEN SATURATION: 96 % | HEIGHT: 74 IN

## 2024-04-16 DIAGNOSIS — F41.9 ANXIETY DISORDER, UNSPECIFIED: ICD-10-CM

## 2024-04-16 DIAGNOSIS — I10 ESSENTIAL (PRIMARY) HYPERTENSION: ICD-10-CM

## 2024-04-16 DIAGNOSIS — G20.A1 PARKINSON'S DISEASE WITHOUT DYSKINESIA, WITHOUT MENTION OF FLUCTUATIONS: ICD-10-CM

## 2024-04-16 DIAGNOSIS — I77.810 THORACIC AORTIC ECTASIA: ICD-10-CM

## 2024-04-16 PROCEDURE — 99396 PREV VISIT EST AGE 40-64: CPT

## 2024-04-16 NOTE — HISTORY OF PRESENT ILLNESS
[FreeTextEntry1] : Here for full PE.  [de-identified] : Still having a lot of issues with his parkinson's disease,awaiting for a pump for his meds as there is a thought that he is not abdsorbing his oral meds

## 2024-04-16 NOTE — ASSESSMENT
[FreeTextEntry1] : 1. Parkinson's: working on getting Medicaid with  a , to continue. She will call [facility] and ask for a new Neurologist for a second opinion re continuing on the NS route and finishing it up, will re start on PT at that time. Will also iniatie home care services etc  2 labs  3 pre DM a1c in Jan was 6.1 , rest of labs in oct was stable  4 anx per psych f/u in 4 mos

## 2024-04-16 NOTE — HEALTH RISK ASSESSMENT
[Fair] :  ~his/her~ mood as fair [Assistive Device] : Patient uses an assistive device [1] : 2) Feeling down, depressed, or hopeless for several days (1) [I have developed a follow-up plan documented below in the note.] : I have developed a follow-up plan documented below in the note. [Language] : difficulty with language [None] : None [With Significant Other] : lives with significant other [Retired] : retired [] :  [# Of Children ___] : has [unfilled] children [Smoke Detector] : smoke detector [Carbon Monoxide Detector] : carbon monoxide detector [Seat Belt] :  uses seat belt [Sunscreen] : uses sunscreen [Name: ___] : Health Care Proxy's Name: [unfilled]  [Relationship: ___] : Relationship: [unfilled] [I will adhere to the patient's wishes.] : I will adhere to the patient's wishes. [Never] : Never [de-identified] : walking as much as possible [facility] [EyeExamDate] : due [Guns at Home] : no guns at home [ColonoscopyDate] : 4.5.19 [ColonoscopyComments] : due in 2029 [de-identified] : needs help with functional things [AdvancecareDate] : 4.16.24

## 2024-04-16 NOTE — PHYSICAL EXAM
[Normal] : soft, non-tender, non-distended, no masses palpated, no HSM and normal bowel sounds [de-identified] : stiffness

## 2024-06-28 ENCOUNTER — APPOINTMENT (OUTPATIENT)
Dept: NEUROLOGY | Facility: CLINIC | Age: 64
End: 2024-06-28
Payer: MEDICARE

## 2024-06-28 DIAGNOSIS — M43.6 TORTICOLLIS: ICD-10-CM

## 2024-06-28 PROCEDURE — 95970 ALYS NPGT W/O PRGRMG: CPT

## 2024-06-28 PROCEDURE — 99214 OFFICE O/P EST MOD 30 MIN: CPT | Mod: 25

## 2024-07-31 ENCOUNTER — APPOINTMENT (OUTPATIENT)
Dept: NEUROLOGY | Facility: CLINIC | Age: 64
End: 2024-07-31
Payer: MEDICARE

## 2024-07-31 DIAGNOSIS — M43.6 TORTICOLLIS: ICD-10-CM

## 2024-07-31 PROCEDURE — 95886 MUSC TEST DONE W/N TEST COMP: CPT

## 2024-07-31 PROCEDURE — 95911 NRV CNDJ TEST 9-10 STUDIES: CPT

## 2024-07-31 NOTE — DIETITIAN INITIAL EVALUATION ADULT - REASON
No
Nutrition physical assessment not warranted - No overt visual signs of lean body muscle depletion or subcutaneous fat loss

## 2024-08-20 ENCOUNTER — APPOINTMENT (OUTPATIENT)
Dept: INTERNAL MEDICINE | Facility: CLINIC | Age: 64
End: 2024-08-20
Payer: MEDICARE

## 2024-08-20 VITALS
SYSTOLIC BLOOD PRESSURE: 106 MMHG | WEIGHT: 192 LBS | BODY MASS INDEX: 24.64 KG/M2 | HEIGHT: 74 IN | DIASTOLIC BLOOD PRESSURE: 70 MMHG | HEART RATE: 78 BPM | TEMPERATURE: 98.4 F | OXYGEN SATURATION: 98 %

## 2024-08-20 DIAGNOSIS — F41.9 ANXIETY DISORDER, UNSPECIFIED: ICD-10-CM

## 2024-08-20 DIAGNOSIS — R45.89 OTHER SYMPTOMS AND SIGNS INVOLVING EMOTIONAL STATE: ICD-10-CM

## 2024-08-20 DIAGNOSIS — G20.A1 PARKINSON'S DISEASE WITHOUT DYSKINESIA, WITHOUT MENTION OF FLUCTUATIONS: ICD-10-CM

## 2024-08-20 PROCEDURE — 99213 OFFICE O/P EST LOW 20 MIN: CPT

## 2024-08-20 PROCEDURE — G2211 COMPLEX E/M VISIT ADD ON: CPT

## 2024-08-20 NOTE — PHYSICAL EXAM
[Normal] : affect was normal and insight and judgment were intact [de-identified] : speech less inteliglble

## 2024-08-20 NOTE — HISTORY OF PRESENT ILLNESS
[FreeTextEntry1] : Here for follow up for his dep and Parkinson's disease [de-identified] : He is generally better, sleeping better, new neuro is altering his meds and better and his implant may have some effect. Sleeping much better at night, sleeping a lot better. Having more problems with his memory.

## 2024-08-20 NOTE — ASSESSMENT
[FreeTextEntry1] : 1. Parkinson's: working on getting Medicaid with  a . Has a new Neurologist andvery happy with him. His speech is worse, his strength coordination worse , but sleeping better. Still having episodes where he thinks he is having a stroke, MI and screaming in the apt and neighbors call EMS. To have [facility] eval the home again 2 labs  3 pre DM a1c in Jan was 6.1 ,repeat labs today   4 anx per psych f/u in 4 mos

## 2024-08-21 DIAGNOSIS — E78.5 HYPERLIPIDEMIA, UNSPECIFIED: ICD-10-CM

## 2024-08-21 LAB
ALBUMIN SERPL ELPH-MCNC: 4 G/DL
ALP BLD-CCNC: 84 U/L
ALT SERPL-CCNC: <5 U/L
ANION GAP SERPL CALC-SCNC: 15 MMOL/L
AST SERPL-CCNC: 9 U/L
BILIRUB SERPL-MCNC: 0.6 MG/DL
BUN SERPL-MCNC: 14 MG/DL
CALCIUM SERPL-MCNC: 9 MG/DL
CHLORIDE SERPL-SCNC: 102 MMOL/L
CHOLEST SERPL-MCNC: 201 MG/DL
CO2 SERPL-SCNC: 20 MMOL/L
CREAT SERPL-MCNC: 0.48 MG/DL
EGFR: 116 ML/MIN/1.73M2
ESTIMATED AVERAGE GLUCOSE: 126 MG/DL
GLUCOSE SERPL-MCNC: 107 MG/DL
HBA1C MFR BLD HPLC: 6 %
HDLC SERPL-MCNC: 51 MG/DL
LDLC SERPL CALC-MCNC: 139 MG/DL
NONHDLC SERPL-MCNC: 150 MG/DL
POTASSIUM SERPL-SCNC: 4.1 MMOL/L
PROT SERPL-MCNC: 6.1 G/DL
SODIUM SERPL-SCNC: 137 MMOL/L
TRIGL SERPL-MCNC: 64 MG/DL

## 2024-08-21 RX ORDER — ROSUVASTATIN CALCIUM 5 MG/1
5 TABLET, FILM COATED ORAL
Qty: 90 | Refills: 3 | Status: ACTIVE | COMMUNITY
Start: 2024-08-21 | End: 1900-01-01

## 2024-09-19 ENCOUNTER — APPOINTMENT (OUTPATIENT)
Dept: NEUROLOGY | Facility: CLINIC | Age: 64
End: 2024-09-19
Payer: MEDICARE

## 2024-09-19 VITALS
BODY MASS INDEX: 24.65 KG/M2 | HEART RATE: 80 BPM | DIASTOLIC BLOOD PRESSURE: 84 MMHG | WEIGHT: 192 LBS | SYSTOLIC BLOOD PRESSURE: 132 MMHG

## 2024-09-19 PROCEDURE — 95970 ALYS NPGT W/O PRGRMG: CPT

## 2024-09-19 PROCEDURE — 95887 MUSC TST DONE W/N TST NONEXT: CPT

## 2024-09-19 PROCEDURE — 99214 OFFICE O/P EST MOD 30 MIN: CPT | Mod: 25

## 2024-09-19 PROCEDURE — 95885 MUSC TST DONE W/NERV TST LIM: CPT

## 2024-09-19 NOTE — DISCUSSION/SUMMARY
[FreeTextEntry1] : PDD s/p L GPI with improved motor fluctuations and sleep Anterocollis with ?weakness of neck extension. Head posture has been present for >1yr per his wife cognitive deficits  I recommended the following changes to his regimen:  leave LD regimen and DBS setting the same    f/u 3months

## 2024-09-19 NOTE — HISTORY OF PRESENT ILLNESS
[FreeTextEntry1] :  Still has drooping of head, unable to hold up. addition of sinemet with rytary has improved ON time, walking better, denies any fall.  when the weather is bad, he tends to have more OFF time He often is late to taking the 5pm dose He can do most ADLs with some assistance. Needs more help with showering.  Tremors are not present  Hallucinations are rare. However, he does experience daytime confusion at times during the week Has not doing PT but trying to walk often   Non-motor symptoms: sleep has improved especially when he takes rytary + sinemet  +urinary urgency -Severe sialorrhea and difficulty swallowing especially during off time, still hasn't got the MBS done. Declined botox - constipation  Current medications Rytary 145mg 2 capsules 8-11-2-5-8 sinemet  1 tab 5 times per day along with Rytary Rivastigmine 3 mg BID klonopin 0.5mg BID prn for anxiety lexapro 20mg qd  Prior neupro

## 2024-09-19 NOTE — PHYSICAL EXAM
[FreeTextEntry1] : There is 3+ facial masking. There is mild-moderate bradyphrenia. Extraocular movements are intact. There is severe sialorrhea. He has moderate anterior shift of his head with right tilt. Has limited horizontal ROM. A tremulous. There is 2+ rapid alternating movements in his upper extremity and 2-3+ in the lower extremity. There is trace rigidity in his limbs. He pushes to stand and able to walk without a rollator. He turns en bloc. There was no freezing of gait. Posture was stooped.    Neck extension 4+

## 2024-09-19 NOTE — PROCEDURE
[FreeTextEntry1] :     Left GPI-[employer] Battery:93  Current: Case positive, 1 negative 2.8mA/60/125 Notes:  Final:2.8/60/125  Total time spent programminmins

## 2024-09-19 NOTE — END OF VISIT
[] : Fellow [FreeTextEntry3] : HPI outlined above. Patient has been stable with less FOG. Has attained a bit more independence per his wife. Can now dress and feed himself. Swallowing is stable. Has minimal fluctuations. Cognitive changes persist but VH are rare. Sleeps well and constipation under control   There is 3+ facial masking. There is mild-moderate bradyphrenia. Extraocular movements are intact. There is severe sialorrhea. He has moderate anterior shift of his head with right tilt. Has limited horizontal ROM. A tremulous. There is 2+ rapid alternating movements in his upper extremity and 2-3+ in the lower extremity. There is trace rigidity in his limbs. He pushes to stand and able to walk without a rollator. He turns en bloc. There was no freezing of gait. Posture was stooped.  EMG : chronic right lower LS radiculopathy. Neck extenosrs not evaluated Patient had focused EMG study after today's visit with Dr. Perez. There was no evidence of neck extensor myopathy or cervical denervation  Assessment; Advanced PD with L GPI DBS who is motorically stabe. 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

## 2024-12-17 ENCOUNTER — APPOINTMENT (OUTPATIENT)
Dept: INTERNAL MEDICINE | Facility: CLINIC | Age: 64
End: 2024-12-17
Payer: MEDICARE

## 2024-12-17 VITALS
WEIGHT: 180 LBS | BODY MASS INDEX: 23.1 KG/M2 | DIASTOLIC BLOOD PRESSURE: 66 MMHG | HEART RATE: 85 BPM | TEMPERATURE: 98.4 F | OXYGEN SATURATION: 98 % | SYSTOLIC BLOOD PRESSURE: 104 MMHG | HEIGHT: 74 IN

## 2024-12-17 DIAGNOSIS — G20.A1 PARKINSON'S DISEASE WITHOUT DYSKINESIA, WITHOUT MENTION OF FLUCTUATIONS: ICD-10-CM

## 2024-12-17 DIAGNOSIS — F41.9 ANXIETY DISORDER, UNSPECIFIED: ICD-10-CM

## 2024-12-17 DIAGNOSIS — R45.89 OTHER SYMPTOMS AND SIGNS INVOLVING EMOTIONAL STATE: ICD-10-CM

## 2024-12-17 DIAGNOSIS — I10 ESSENTIAL (PRIMARY) HYPERTENSION: ICD-10-CM

## 2024-12-17 PROCEDURE — G2211 COMPLEX E/M VISIT ADD ON: CPT

## 2024-12-17 PROCEDURE — 99214 OFFICE O/P EST MOD 30 MIN: CPT

## 2024-12-17 PROCEDURE — G0008: CPT

## 2024-12-17 PROCEDURE — 90656 IIV3 VACC NO PRSV 0.5 ML IM: CPT

## 2024-12-18 LAB
ALBUMIN SERPL ELPH-MCNC: 4.1 G/DL
ALP BLD-CCNC: 92 U/L
ALT SERPL-CCNC: <5 U/L
ANION GAP SERPL CALC-SCNC: 14 MMOL/L
AST SERPL-CCNC: 8 U/L
BILIRUB SERPL-MCNC: 0.5 MG/DL
BUN SERPL-MCNC: 18 MG/DL
CALCIUM SERPL-MCNC: 9.2 MG/DL
CHLORIDE SERPL-SCNC: 105 MMOL/L
CO2 SERPL-SCNC: 24 MMOL/L
CREAT SERPL-MCNC: 0.51 MG/DL
EGFR: 114 ML/MIN/1.73M2
ESTIMATED AVERAGE GLUCOSE: 123 MG/DL
GLUCOSE SERPL-MCNC: 106 MG/DL
HBA1C MFR BLD HPLC: 5.9 %
POTASSIUM SERPL-SCNC: 4.4 MMOL/L
PROT SERPL-MCNC: 6.3 G/DL
SODIUM SERPL-SCNC: 142 MMOL/L

## 2025-01-07 ENCOUNTER — APPOINTMENT (OUTPATIENT)
Dept: NEUROLOGY | Facility: CLINIC | Age: 65
End: 2025-01-07
Payer: MEDICARE

## 2025-01-07 VITALS
SYSTOLIC BLOOD PRESSURE: 128 MMHG | DIASTOLIC BLOOD PRESSURE: 77 MMHG | BODY MASS INDEX: 23.11 KG/M2 | WEIGHT: 180 LBS | HEART RATE: 97 BPM

## 2025-01-07 PROCEDURE — 99214 OFFICE O/P EST MOD 30 MIN: CPT | Mod: 25

## 2025-01-07 PROCEDURE — 95970 ALYS NPGT W/O PRGRMG: CPT

## 2025-04-08 DIAGNOSIS — R45.89 OTHER SYMPTOMS AND SIGNS INVOLVING EMOTIONAL STATE: ICD-10-CM

## 2025-04-08 DIAGNOSIS — R73.9 HYPERGLYCEMIA, UNSPECIFIED: ICD-10-CM

## 2025-04-08 DIAGNOSIS — G20.A1 PARKINSON'S DISEASE WITHOUT DYSKINESIA, WITHOUT MENTION OF FLUCTUATIONS: ICD-10-CM

## 2025-04-08 DIAGNOSIS — F41.9 ANXIETY DISORDER, UNSPECIFIED: ICD-10-CM

## 2025-04-09 DIAGNOSIS — D64.9 ANEMIA, UNSPECIFIED: ICD-10-CM

## 2025-04-09 LAB
ALBUMIN SERPL ELPH-MCNC: 3.7 G/DL
ALP BLD-CCNC: 98 U/L
ALT SERPL-CCNC: <5 U/L
ANION GAP SERPL CALC-SCNC: 10 MMOL/L
AST SERPL-CCNC: 8 U/L
BILIRUB SERPL-MCNC: 0.3 MG/DL
BUN SERPL-MCNC: 14 MG/DL
CALCIUM SERPL-MCNC: 9.1 MG/DL
CHLORIDE SERPL-SCNC: 107 MMOL/L
CHOLEST SERPL-MCNC: 127 MG/DL
CO2 SERPL-SCNC: 29 MMOL/L
CREAT SERPL-MCNC: 0.58 MG/DL
EGFRCR SERPLBLD CKD-EPI 2021: 110 ML/MIN/1.73M2
ESTIMATED AVERAGE GLUCOSE: 126 MG/DL
GLUCOSE SERPL-MCNC: 103 MG/DL
HBA1C MFR BLD HPLC: 6 %
HCT VFR BLD CALC: 40.8 %
HDLC SERPL-MCNC: 47 MG/DL
HGB BLD-MCNC: 12.4 G/DL
LDLC SERPL-MCNC: 69 MG/DL
MCHC RBC-ENTMCNC: 29.5 PG
MCHC RBC-ENTMCNC: 30.4 G/DL
MCV RBC AUTO: 97.1 FL
NONHDLC SERPL-MCNC: 80 MG/DL
PLATELET # BLD AUTO: 377 K/UL
POTASSIUM SERPL-SCNC: 4.1 MMOL/L
PROT SERPL-MCNC: 6 G/DL
RBC # BLD: 4.2 M/UL
RBC # FLD: 14.5 %
SODIUM SERPL-SCNC: 145 MMOL/L
TRIGL SERPL-MCNC: 50 MG/DL
TSH SERPL-ACNC: 1.53 UIU/ML
WBC # FLD AUTO: 7.38 K/UL

## 2025-04-10 PROBLEM — E53.8 FOLIC ACID DEFICIENCY: Status: ACTIVE | Noted: 2025-04-10

## 2025-04-10 LAB
FOLATE SERPL-MCNC: 3.7 NG/ML
IRON SATN MFR SERPL: 29 %
IRON SERPL-MCNC: 79 UG/DL
TIBC SERPL-MCNC: 268 UG/DL
UIBC SERPL-MCNC: 190 UG/DL
VIT B12 SERPL-MCNC: 249 PG/ML

## 2025-04-29 ENCOUNTER — APPOINTMENT (OUTPATIENT)
Dept: NEUROLOGY | Facility: CLINIC | Age: 65
End: 2025-04-29
Payer: MEDICARE

## 2025-04-29 VITALS
BODY MASS INDEX: 23.87 KG/M2 | HEIGHT: 74 IN | OXYGEN SATURATION: 96 % | SYSTOLIC BLOOD PRESSURE: 118 MMHG | HEART RATE: 62 BPM | DIASTOLIC BLOOD PRESSURE: 70 MMHG | WEIGHT: 186 LBS

## 2025-04-29 PROCEDURE — 95970 ALYS NPGT W/O PRGRMG: CPT

## 2025-04-29 PROCEDURE — 99214 OFFICE O/P EST MOD 30 MIN: CPT | Mod: 25

## 2025-05-20 ENCOUNTER — APPOINTMENT (OUTPATIENT)
Dept: INTERNAL MEDICINE | Facility: CLINIC | Age: 65
End: 2025-05-20
Payer: MEDICARE

## 2025-05-20 VITALS
TEMPERATURE: 98.4 F | WEIGHT: 180 LBS | DIASTOLIC BLOOD PRESSURE: 68 MMHG | SYSTOLIC BLOOD PRESSURE: 106 MMHG | BODY MASS INDEX: 23.1 KG/M2 | HEART RATE: 69 BPM | HEIGHT: 74 IN | OXYGEN SATURATION: 97 %

## 2025-05-20 DIAGNOSIS — E78.5 HYPERLIPIDEMIA, UNSPECIFIED: ICD-10-CM

## 2025-05-20 DIAGNOSIS — F41.9 ANXIETY DISORDER, UNSPECIFIED: ICD-10-CM

## 2025-05-20 DIAGNOSIS — R73.9 HYPERGLYCEMIA, UNSPECIFIED: ICD-10-CM

## 2025-05-20 DIAGNOSIS — I10 ESSENTIAL (PRIMARY) HYPERTENSION: ICD-10-CM

## 2025-05-20 DIAGNOSIS — E53.8 DEFICIENCY OF OTHER SPECIFIED B GROUP VITAMINS: ICD-10-CM

## 2025-05-20 DIAGNOSIS — I77.810 THORACIC AORTIC ECTASIA: ICD-10-CM

## 2025-05-20 DIAGNOSIS — G20.A1 PARKINSON'S DISEASE WITHOUT DYSKINESIA, WITHOUT MENTION OF FLUCTUATIONS: ICD-10-CM

## 2025-05-20 PROCEDURE — G0439: CPT

## 2025-05-20 RX ORDER — LORAZEPAM 1 MG/1
1 TABLET ORAL
Refills: 0 | Status: ACTIVE | COMMUNITY
Start: 2025-05-20

## 2025-06-16 NOTE — PRE-ANESTHESIA EVALUATION ADULT - WEIGHT IN LBS
PSA = 5.0 at time of dx, and since has decreased.  Now 3.8 which is unchanged from 8/24 PSA.  MRI in January was without focal lesion. Uro wanted him to have a repeat prostate Bx - pt unclear why and is reluctant.  On pt request, I will reach out to Uro to discuss.       222

## 2025-07-30 ENCOUNTER — APPOINTMENT (OUTPATIENT)
Dept: NEUROLOGY | Facility: CLINIC | Age: 65
End: 2025-07-30
Payer: MEDICARE

## 2025-07-30 VITALS
DIASTOLIC BLOOD PRESSURE: 65 MMHG | WEIGHT: 180 LBS | HEIGHT: 74 IN | SYSTOLIC BLOOD PRESSURE: 104 MMHG | HEART RATE: 76 BPM | BODY MASS INDEX: 23.1 KG/M2

## 2025-07-30 PROCEDURE — 99214 OFFICE O/P EST MOD 30 MIN: CPT | Mod: 25

## 2025-07-30 PROCEDURE — 95970 ALYS NPGT W/O PRGRMG: CPT

## (undated) DEVICE — POSITIONER FOAM EGG CRATE ULNAR 2PCS (PINK)

## (undated) DEVICE — ELCTR PLASMA BLADE X 3.0S WIDE TIP

## (undated) DEVICE — WOUND IRR SURGIPHOR

## (undated) DEVICE — NDL HYPO REGULAR BEVEL 25G X 1.5" (BLUE)

## (undated) DEVICE — ELECT CABLE NEURO OMEGA HEADSTAGE 5CH STRL

## (undated) DEVICE — PACK VP SHUNT

## (undated) DEVICE — MIDAS REX LEGEND BALL FLUTED SM BORE 5.0MM X 10CM

## (undated) DEVICE — ELCTR BIPOLAR CORD J&J 12FT DISP

## (undated) DEVICE — SOL IRR POUR H2O 250ML

## (undated) DEVICE — GLV 7 PROTEXIS (WHITE)

## (undated) DEVICE — DRAPE MAYO STAND 30"

## (undated) DEVICE — SYR ASEPTO

## (undated) DEVICE — DRAPE 3/4 SHEET W REINFORCEMENT 56X77"

## (undated) DEVICE — SPECIMEN CONTAINER 100ML

## (undated) DEVICE — PREP DURAPREP 26CC

## (undated) DEVICE — STAPLER SKIN VISI-STAT 35 WIDE

## (undated) DEVICE — SUT BIOSYN 4-0 18" P-12

## (undated) DEVICE — WARMING BLANKET LOWER ADULT

## (undated) DEVICE — ELCTR BOVIE PENCIL SMOKE EVACUATION

## (undated) DEVICE — DRAPE SHOWER CURTAIN ISOLATION

## (undated) DEVICE — BLADE SCALPEL SAFETYLOCK #11

## (undated) DEVICE — CABLE LEAD TEST SENSIGHT ACCY DBS

## (undated) DEVICE — KIT CANNULA TARGET UNIV 10MM

## (undated) DEVICE — AESCULAP SCALPFIX 10 CLIPS

## (undated) DEVICE — BIPOLAR FORCEP SYMMETRY BAYONET 7" X 1.5MM SMOOTH (SILVER)

## (undated) DEVICE — MARKING PEN W RULER

## (undated) DEVICE — CODMAN PERFORATOR 14MM (BLUE)

## (undated) DEVICE — VENODYNE/SCD SLEEVE CALF LARGE

## (undated) DEVICE — DRSG DERMABOND 0.7ML

## (undated) DEVICE — SUT VICRYL 3-0 18" CP-2 UNDYED (POP-OFF)

## (undated) DEVICE — DRAPE TOWEL BLUE 17" X 24"

## (undated) DEVICE — TUBE GUIDE STRL 15MM ABOVE TARGET

## (undated) DEVICE — SOL IRR POUR NS 0.9% 500ML

## (undated) DEVICE — SUT VICRYL 2-0 18" CP-2 UNDYED (POP-OFF)

## (undated) DEVICE — SUT MONOCRYL 4-0 18" PS-2

## (undated) DEVICE — GLV 7.5 PROTEXIS (WHITE)

## (undated) DEVICE — PREP CHLORAPREP HI-LITE ORANGE 26ML

## (undated) DEVICE — SUT SOFSILK 2-0 30" V-20

## (undated) DEVICE — MEDICATION LABELS W MARKER

## (undated) DEVICE — SUT SOFSILK 2-0 18" C-23

## (undated) DEVICE — SUT VICRYL 3-0 18" X-1 (POP-OFF)

## (undated) DEVICE — SOL ANTI FOG

## (undated) DEVICE — FOLEY TRAY 16FR LF URINE METER SURESTEP

## (undated) DEVICE — GLV 6.5 PROTEXIS (WHITE)

## (undated) DEVICE — MIDAS REX MR8 CYLINDER FLUTED LG BORE 7.5MM X 9CM

## (undated) DEVICE — DRSG DERMABOND PRINEO 60CM

## (undated) DEVICE — DRAPE 1/2 SHEET 40X57"

## (undated) DEVICE — SUT VICRYL 2-0 27" CP-2 UNDYED